# Patient Record
Sex: MALE | Race: WHITE | NOT HISPANIC OR LATINO | Employment: OTHER | URBAN - METROPOLITAN AREA
[De-identification: names, ages, dates, MRNs, and addresses within clinical notes are randomized per-mention and may not be internally consistent; named-entity substitution may affect disease eponyms.]

---

## 2017-03-01 ENCOUNTER — ALLSCRIPTS OFFICE VISIT (OUTPATIENT)
Dept: OTHER | Facility: OTHER | Age: 72
End: 2017-03-01

## 2017-03-29 DIAGNOSIS — I10 ESSENTIAL (PRIMARY) HYPERTENSION: ICD-10-CM

## 2017-04-03 ENCOUNTER — ALLSCRIPTS OFFICE VISIT (OUTPATIENT)
Dept: OTHER | Facility: OTHER | Age: 72
End: 2017-04-03

## 2017-06-09 ENCOUNTER — ALLSCRIPTS OFFICE VISIT (OUTPATIENT)
Dept: OTHER | Facility: OTHER | Age: 72
End: 2017-06-09

## 2017-06-12 ENCOUNTER — GENERIC CONVERSION - ENCOUNTER (OUTPATIENT)
Dept: OTHER | Facility: OTHER | Age: 72
End: 2017-06-12

## 2017-06-12 ENCOUNTER — APPOINTMENT (OUTPATIENT)
Dept: LAB | Facility: CLINIC | Age: 72
End: 2017-06-12
Payer: COMMERCIAL

## 2017-06-12 ENCOUNTER — TRANSCRIBE ORDERS (OUTPATIENT)
Dept: LAB | Facility: CLINIC | Age: 72
End: 2017-06-12

## 2017-06-12 DIAGNOSIS — F45.8 ANXIETY HYPERVENTILATION: Primary | ICD-10-CM

## 2017-06-12 DIAGNOSIS — F41.9 ANXIETY HYPERVENTILATION: Primary | ICD-10-CM

## 2017-06-12 LAB — VENIPUNCTURE: NORMAL

## 2017-06-12 PROCEDURE — 36415 COLL VENOUS BLD VENIPUNCTURE: CPT

## 2017-06-13 LAB
A/G RATIO (HISTORICAL): 2.1 (ref 1.2–2.2)
ALBUMIN SERPL BCP-MCNC: 4.6 G/DL (ref 3.5–4.8)
ALP SERPL-CCNC: 74 IU/L (ref 39–117)
ALT SERPL W P-5'-P-CCNC: 14 IU/L (ref 0–44)
AMYLASE (HISTORICAL): 70 U/L (ref 31–124)
AST SERPL W P-5'-P-CCNC: 15 IU/L (ref 0–40)
BACTERIA UR QL AUTO: NORMAL
BASOPHILS # BLD AUTO: 0 X10E3/UL (ref 0–0.2)
BASOPHILS # BLD AUTO: 1 %
BILIRUB SERPL-MCNC: 0.4 MG/DL (ref 0–1.2)
BILIRUB UR QL STRIP: NEGATIVE
BUN SERPL-MCNC: 20 MG/DL (ref 8–27)
BUN/CREA RATIO (HISTORICAL): 21 (ref 10–24)
CALCIUM SERPL-MCNC: 9.5 MG/DL (ref 8.6–10.2)
CHLORIDE SERPL-SCNC: 100 MMOL/L (ref 96–106)
CHOLEST SERPL-MCNC: 142 MG/DL (ref 100–199)
CHOLEST/HDLC SERPL: 4.2 RATIO UNITS (ref 0–5)
CO2 SERPL-SCNC: 25 MMOL/L (ref 18–29)
COLOR UR: YELLOW
COMMENT (HISTORICAL): CLEAR
CREAT SERPL-MCNC: 0.96 MG/DL (ref 0.76–1.27)
DEPRECATED RDW RBC AUTO: 15.9 % (ref 12.3–15.4)
EGFR AFRICAN AMERICAN (HISTORICAL): 90 ML/MIN/1.73
EGFR-AMERICAN CALC (HISTORICAL): 78 ML/MIN/1.73
EOSINOPHIL # BLD AUTO: 0.1 X10E3/UL (ref 0–0.4)
EOSINOPHIL # BLD AUTO: 2 %
ERYTHROCYTE SEDIMENTATION RATE (HISTORICAL): 9 MM/HR (ref 0–30)
FECAL OCCULT BLOOD DIAGNOSTIC (HISTORICAL): NEGATIVE
GLUCOSE (HISTORICAL): NEGATIVE
GLUCOSE SERPL-MCNC: 98 MG/DL (ref 65–99)
HCT VFR BLD AUTO: 39.6 % (ref 37.5–51)
HDLC SERPL-MCNC: 34 MG/DL
HGB BLD-MCNC: 12.8 G/DL (ref 12.6–17.7)
IMM.GRANULOCYTES (CD4/8) (HISTORICAL): 0 %
IMM.GRANULOCYTES (CD4/8) (HISTORICAL): 0 X10E3/UL (ref 0–0.1)
INTERPRETATION (HISTORICAL): NORMAL
KETONES UR STRIP-MCNC: NEGATIVE MG/DL
LDLC SERPL CALC-MCNC: 78 MG/DL (ref 0–99)
LEUKOCYTE ESTERASE UR QL STRIP: NEGATIVE
LIPASE SERPL-CCNC: 30 U/L (ref 0–59)
LYMPHOCYTES # BLD AUTO: 1.3 X10E3/UL (ref 0.7–3.1)
LYMPHOCYTES # BLD AUTO: 21 %
MAGNESIUM SERPL-MCNC: 2.4 MG/DL (ref 1.6–2.3)
MCH RBC QN AUTO: 27 PG (ref 26.6–33)
MCHC RBC AUTO-ENTMCNC: 32.3 G/DL (ref 31.5–35.7)
MCV RBC AUTO: 84 FL (ref 79–97)
MICROSCOPIC EXAMINATION (HISTORICAL): NORMAL
MICROSCOPIC EXAMINATION (HISTORICAL): NORMAL
MONOCYTES # BLD AUTO: 0.4 X10E3/UL (ref 0.1–0.9)
MONOCYTES (HISTORICAL): 7 %
MUCUS THREADS (HISTORICAL): PRESENT
NEUTROPHILS # BLD AUTO: 4.3 X10E3/UL (ref 1.4–7)
NEUTROPHILS # BLD AUTO: 69 %
NITRITE UR QL STRIP: NEGATIVE
NON-SQ EPI CELLS URNS QL MICRO: NORMAL /HPF
PH UR STRIP.AUTO: 6.5 [PH] (ref 5–7.5)
PLATELET # BLD AUTO: 192 X10E3/UL (ref 150–379)
POTASSIUM SERPL-SCNC: 4.3 MMOL/L (ref 3.5–5.2)
PROT UR STRIP-MCNC: NEGATIVE MG/DL
RBC (HISTORICAL): 4.74 X10E6/UL (ref 4.14–5.8)
RBC (HISTORICAL): NORMAL /HPF
SODIUM SERPL-SCNC: 139 MMOL/L (ref 134–144)
SP GR UR STRIP.AUTO: 1.02 (ref 1–1.03)
TOT. GLOBULIN, SERUM (HISTORICAL): 2.2 G/DL (ref 1.5–4.5)
TOTAL PROTEIN (HISTORICAL): 6.8 G/DL (ref 6–8.5)
TRIGL SERPL-MCNC: 149 MG/DL (ref 0–149)
TSH SERPL DL<=0.05 MIU/L-ACNC: 1.35 UIU/ML (ref 0.45–4.5)
URINALYSIS (UA) (HISTORICAL): NORMAL
UROBILINOGEN UR QL STRIP.AUTO: 0.2 EU/DL (ref 0.2–1)
VLDLC SERPL CALC-MCNC: 30 MG/DL (ref 5–40)
WBC # BLD AUTO: 6.2 X10E3/UL (ref 3.4–10.8)
WBC # BLD AUTO: NORMAL /HPF

## 2017-06-14 ENCOUNTER — GENERIC CONVERSION - ENCOUNTER (OUTPATIENT)
Dept: OTHER | Facility: OTHER | Age: 72
End: 2017-06-14

## 2017-08-30 ENCOUNTER — ALLSCRIPTS OFFICE VISIT (OUTPATIENT)
Dept: OTHER | Facility: OTHER | Age: 72
End: 2017-08-30

## 2017-11-15 ENCOUNTER — GENERIC CONVERSION - ENCOUNTER (OUTPATIENT)
Dept: OTHER | Facility: OTHER | Age: 72
End: 2017-11-15

## 2017-12-22 ENCOUNTER — ALLSCRIPTS OFFICE VISIT (OUTPATIENT)
Dept: OTHER | Facility: OTHER | Age: 72
End: 2017-12-22

## 2017-12-29 NOTE — PROGRESS NOTES
Assessment   1  Memory loss (780 93) (R41 3)   2  Benign hypertension (401 1) (I10)   3  Aortic stenosis, moderate (424 1) (I35 0)   4  Depression with anxiety (300 4) (F41 8)    Plan   Acute sinusitis    · Fluticasone Propionate 50 MCG/ACT Nasal Suspension  Aortic stenosis, moderate, Benign hypertension    · 1 - Kamilah King DO (Cardiology) Co-Management  *  Status: Active  Requested for:    06Whp8359  Care Summary provided  : Yes  Memory loss    · 1 Analilia Anton MD, [facility] Co-Management  *  Status: Active  Requested for:    50VHN1659  Care Summary provided  : Yes    Discussion/Summary   Possible side effects of new medications were reviewed with the patient/guardian today  The treatment plan was reviewed with the patient/guardian  The patient/guardian understands and agrees with the treatment plan      Chief Complaint   pt here for med check /bp   he did not get his refill in sept so he hasnât taken his valsartan since then   pt says he is starting to get forgetful , not everyday tc/cma      Advance Directives   Advance Directive [facility] Staff:    The patient is in agreement to receive the [address] - Patient has an advance health care directive  The patient has a living will located  in patient's home  The patient has a signed POLST form located in patient's home  Active Problems   1  Acute sinusitis (461 9) (J01 90)   2  Anxiety (300 00) (F41 9)   3  Aortic stenosis, moderate (424 1) (I35 0)   4  Arthritis of knee (716 96) (M17 10)   5  Atherosclerosis of arteries of extremities (440 20) (I70 209)   6  Atypical chest pain (786 59) (R07 89)   7  Benign hypertension (401 1) (I10)   8  BMI 26 0-26 9,adult (V85 22) (Z68 26)   9  Body mass index (BMI) of 28 0 to 28 9 in adult (V85 24) (Z68 28)   10  Bunion, unspecified laterality   11  Carcinoma in situ of prostate (233 4) (D07 5)   12  Chronic constipation (564 00) (K59 09)   13  Colon cancer screening (V76 51) (Z12 11)   14  Depression with anxiety (300 4) (F41 8)   15  Encounter for screening for malignant neoplasm of colon (V76 51) (Z12 11)   16  Flu vaccine need (V04 81) (Z23)   17  Fracture of bone of nasal sinus (801 00) (S02 19XA)   18  Glaucoma (365 9) (H40 9)   19  Hammer toe, unspecified laterality (735 4) (M20 40)   20  Headache (784 0) (R51)   21  Hearing Loss (389 9)   22  Influenza vaccination administered during current admission (V04 81) (Z23)   23  Inguinal pain (789 09) (R10 30)   24  Injury of head, initial encounter (959 01) (S09 90XA)   25  Knee pain, left (719 46) (M25 562)   26  Medicare annual wellness visit, subsequent (V70 0) (Z00 00)   27  Memory loss (780 93) (R41 3)   28  Muscle cramps (729 82) (R25 2)   29  Need for influenza vaccination (V04 81) (Z23)   30  Need for pneumococcal vaccination (V03 82) (Z23)   31  Need for tetanus booster (V03 7) (Z23)   32  Osteoarthritis of left knee (715 96) (M17 12)   33  Plantar fasciitis (728 71) (M72 2)   34  Preop examination (V72 84) (Z01 818)   35  Primary localized osteoarthritis of left knee (715 16) (M17 12)   36  Reported Hx Of Knee Replacement   37  Right leg pain (729 5) (M79 604)   38  Screening for depression (V79 0) (Z13 89)   39  Screening for genitourinary condition (V81 6) (Z13 89)   40  Screening for neurological condition (V80 09) (Z13 89)   41  Wears glasses (V49 89) (Z97 3)   42  Well adult on routine health check (V70 0) (Z00 00)    Past Medical History   1  _ (787 0)   2  _ (780 79)   3  Abnormal blood chemistry (790 6) (R79 9)   4  Acute upper respiratory infection (465 9) (J06 9)   5  Benign hypertension (401 1) (I10)   6  History of Bursitis of hip, unspecified laterality   7  History of Common migraine without aura (346 10) (G43 009)   8  History of Deep vein thrombophlebitis of leg, unspecified laterality (451 19) (I80 209)   9  Fever of unknown origin (780 60) (R50 9)   10   Foot ulcer, unspecified laterality, with unspecified severity (987 15) (L97 509)   11  Generalized anxiety disorder (300 02) (F41 1)   12  Glaucoma (365 9) (H40 9)   13  History of Hearing problem (V41 2) (H91 90)   14  Herniated Intervertebral Disc (722 2)   15  History of arthritis (V13 4) (Z87 39)   16  History of backache (V13 59) (Z87 39)   17  History of diarrhea (V12 79) (Z87 898)   18  History of headache (V13 89) (Z87 898)   19  History of malignant neoplasm of prostate (V10 46) (Z85 46)   20  History of malignant neoplasm of skin (V10 83) (Z85 828)   21  History of peripheral vascular disease (V12 59) (Z86 79)   22  Limb pain (729 5) (M79 609)   23  Localized, primary osteoarthritis of lower leg, unspecified laterality   24  Shoulder joint pain, unspecified laterality   25  History of Skin cancer of nose (173 30) (C44 301)   26  History of Skin rash (782 1) (R21)   27  History of Visit for pre-operative examination (V72 84) (S44 782)    Surgical History   1  History of Cataract Surgery   2  History of Cystoscopy With Insertion Of Ureteral Stent   3  History of Eye Surgery   4  History of Hernia Repair Inguinal Sliding   5  History of Needle Biopsy Of Prostate   6  History of Needle/Cath Placement For Brachyther Applic Into Genitalia   7  History of Reported Hx Of Knee Replacement   8  History of Simple Bunion Exostectomy (Silver Procedure)   9  History of Treatment Of Spinal Fracture    Family History   Mother    1  Family history of Alzheimer's dementia  Father    2  Family history of    3  Family history of cerebrovascular accident (V17 1) (Z82 3)    Social History    · Denied: History of Drug use   · Exercise habits   · Exercise: Walking   ·    · Never A Smoker   · No alcohol use   · Sleeps 8 - 10 hours a day    Current Meds    1  Combigan 0 2-0 5 % Ophthalmic Solution; Administer 1 drop to both eys two times a day; Therapy: (Recorded:90Pdi3940) to Recorded   2  Fluticasone Propionate 50 MCG/ACT Nasal Suspension;  Two sprays in each nostril     once daily; Therapy: 91FUF5676 to (Last Rx:15Nov2017)  Requested for: 29RSF1921 Ordered   3  Lumigan 0 01 % Ophthalmic Solution; Therapy: (Recorded:03Apr2017) to Recorded    Allergies   1  No Known Drug Allergies    Vitals   Vital Signs    Recorded: 22Dec2017 11:11AM   Temperature 96 9 F, Temporal   Heart Rate 58, R Radial   Pulse Quality Normal, R Radial   Respiration Quality Normal   Respiration 12   Systolic 901, LUE, Sitting   Diastolic 80, LUE, Sitting   Height 5 ft 6 5 in   Weight 173 lb    BMI Calculated 27 5   BSA Calculated 1 89     Results/Data   Falls Risk Assessment (Dx Z13 89 Screen for Neurologic Disorder) 22Dec2017 11:39AM User, [facility]      Test Name Result Flag Reference   Falls Risk      No falls in the past year      PHQ-9 Adult Depression Screening 22Dec2017 11:22AM User, [facility]      Test Name Result Flag Reference   PHQ-9 Adult Depression Score 2     Over the last two weeks, how often have you been bothered by any of the following problems? Little interest or pleasure in doing things: Not at all - 0     Feeling down, depressed, or hopeless: Several days - 1     Trouble falling or staying asleep, or sleeping too much: Not at all - 0     Feeling tired or having little energy: Not at all - 0     Poor appetite or over eating: Not at all - 0     Feeling bad about yourself - or that you are a failure or have let yourself or your family down: Not at all - 0     Trouble concentrating on things, such as reading the newspaper or watching television: Several days - 1     Moving or speaking so slowly that other people could have noticed   Or the opposite -  being so fidgety or restless that you have been moving around a lot more than usual: Not at all - 0     Thoughts that you would be better off dead, or of hurting yourself in some way: Not at all - 0   PHQ-9 Adult Depression Screening Negative     PHQ-9 Difficulty Level Not difficult at all     PHQ-9 Severity Minimal Depression          Future Appointments Date/Time Provider Specialty Site   02/02/2018 09:40 AM Sherren Ridges, DO Cardiology [facility] [address] [facility] [address]     Signatures    Electronically signed by : MIKKI Hyman ; Dec 28 2017  8:04AM EST                       (Author)

## 2018-01-12 NOTE — MISCELLANEOUS
Assessment    1  Atypical chest pain (786 59) (R07 89)   2  Benign hypertension (401 1) (I10)    Plan  Benign hypertension    · AmLODIPine Besylate 5 MG Oral Tablet (Norvasc); take 1 tablet by mouth every  day   Rx By: Latonya Atkins; Dispense: 0 Days ; #:90 Tablet; Refill: 3; For: Benign hypertension; JOSÉ MANUEL = N; Verified Transmission to [facility] #033; Last Updated By: System, [employer]; 12/5/2016 1:43:26 PM  Encounter for screening for malignant neoplasm of colon    · COLONOSCOPY; Status:Active - Retrospective Authorization; Requested for:[id number];    Perform:[facility]; U:[id number]; Last Updated By:Dayana Goss; 12/5/2016 1:06:17 PM;Ordered;  For:Encounter for screening for malignant neoplasm of colon; Ordered By:Dayan Fragoso; Discussion/Summary  Discussion Summary:   Will add ASA 81 mg a day   rto in 6 m for BP check hospital records reviewed  Medication SE Review and Pt Understands Tx: The treatment plan was reviewed with the patient/guardian  The patient/guardian understands and agrees with the treatment plan      Chief Complaint  Chief Complaint Free Text Note Form: TCM  ksd,cma      History of Present Illness  TCM Communication [facility]: The patient is being contacted for RADHA Reilly LPN [birth year]  He was hospitalized at [address]  The date of admission: 11/28/16, date of discharge: 11/30/16  Diagnosis: Chest pain, hypertension  He was discharged to home  Medications reviewed and updated today  He scheduled a follow up appointment     Symptoms: weakness and fatigue, but no fever, no dizziness, no headache, no cough, no chest pain, no back pain on left side, no back pain on right side, no arm pain left side, no arm pain on right side, no leg pain on left side, no leg pain on right side, no upper abdominal pain, no middle abdominal pain, no lower abdominal pain, no rash:, no anorexia, no nausea, no vomiting, no loose stools, no pain with urinating, no incisional pain, no wound drainage and no swelling  Counseling was provided to the patient  Communication performed and completed by RADHA Reilly LPN 84/0/3302   HPI: 66 y o m seen for f/u 24 h observation for episode of CP - normal nuclear stress test and ECHO, has [facility] cardio in 2 days - asymptomatic, Lipids reviewed      Review of Systems  Complete-Male:   Constitutional: No fever or chills, feels well, no tiredness, no recent weight gain or weight loss  Eyes: No complaints of eye pain, no red eyes, no discharge from eyes, no itchy eyes  ENT: no complaints of earache, no hearing loss, no nosebleeds, no nasal discharge, no sore throat, no hoarseness  Cardiovascular: No complaints of slow heart rate, no fast heart rate, no chest pain, no palpitations, no leg claudication, no lower extremity  Respiratory: No complaints of shortness of breath, no wheezing, no cough, no SOB on exertion, no orthopnea or PND  Gastrointestinal: No complaints of abdominal pain, no constipation, no nausea or vomiting, no diarrhea or bloody stools  Genitourinary: No complaints of dysuria, no incontinence, no hesitancy, no nocturia, no genital lesion, no testicular pain  Musculoskeletal: No complaints of arthralgia, no myalgias, no joint swelling or stiffness, no limb pain or swelling  Integumentary: No complaints of skin rash or skin lesions, no itching, no skin wound, no dry skin  Neurological: No compliants of headache, no confusion, no convulsions, no numbness or tingling, no dizziness or fainting, no limb weakness, no difficulty walking  Psychiatric: Is not suicidal, no sleep disturbances, no anxiety or depression, no change in personality, no emotional problems  Endocrine: No complaints of proptosis, no hot flashes, no muscle weakness, no erectile dysfunction, no deepening of the voice, no feelings of weakness     Hematologic/Lymphatic: No complaints of swollen glands, no swollen glands in the neck, does not bleed easily, no easy bruising  Active Problems    1  Anxiety (300 00) (F41 9)   2  Arthritis of knee (716 96) (M19 90)   3  Atherosclerosis of arteries of extremities (440 20) (I70 209)   4  Bunion, unspecified laterality   5  Carcinoma in situ of prostate (233 4) (D07 5)   6  Colon cancer screening (V76 51) (Z12 11)   7  Depression with anxiety (300 4) (F41 8)   8  Flu vaccine need (V04 81) (Z23)   9  Fracture of bone of nasal sinus (801 00) (S02 19XA)   10  Glaucoma (365 9) (H40 9)   11  Hammer toe, unspecified laterality (735 4) (M20 40)   12  Hearing Loss (389 9)   13  Influenza vaccination administered during current admission (V04 81) (Z23)   14  Inguinal pain (789 09) (R10 30)   15  Knee pain, left (719 46) (M25 562)   16  Memory loss (780 93) (R41 3)   17  Muscle cramps (729 82) (R25 2)   18  Osteoarthritis of left knee (715 96) (M17 9)   19  Plantar fasciitis (728 71) (M72 2)   20  Preop examination (V72 84) (Z01 818)   21  Primary localized osteoarthritis of left knee (715 16) (M17 12)   22  Reported Hx Of Knee Replacement   23  Right leg pain (729 5) (M79 604)   24  Screening for depression (V79 0) (Z13 89)   25  Screening for genitourinary condition (V81 6) (Z13 89)   26  Screening for neurological condition (V80 09) (Z13 89)   27  Wears glasses (V49 89) (Z97 3)   28  Well adult on routine health check (V70 0) (Z00 00)    Past Medical History    1  _ (787 0)   2  _ (780 79)   3  Abnormal blood chemistry (790 6) (R79 9)   4  Acute upper respiratory infection (465 9) (J06 9)   5  History of Bursitis of hip, unspecified laterality (726 5) (M70 70)   6  History of Common migraine without aura (346 10) (G43 009)   7  History of Deep vein thrombophlebitis of leg, unspecified laterality (451 19) (I80 209)   8  Fever of unknown origin (780 60) (R50 9)   9  Foot ulcer, unspecified laterality, with unspecified severity (707 15) (L97 509)   10  Generalized anxiety disorder (300 02) (F41 1)   11   Herniated Intervertebral Disc (722 2)   12  History of backache (V13 59) (Z87 39)   13  History of diarrhea (V12 79) (Z87 898)   14  History of headache (V13 89) (Z87 898)   15  History of malignant neoplasm of prostate (V10 46) (Z85 46)   16  History of peripheral vascular disease (V12 59) (Z86 79)   17  Limb pain (729 5) (M79 609)   18  Localized, primary osteoarthritis of lower leg, unspecified laterality   19  Shoulder joint pain, unspecified laterality   20  History of Skin cancer of nose (173 30) (C44 301)   21  History of Skin rash (782 1) (R21)   22  History of Visit for pre-operative examination (V72 84) (F94 187)    Surgical History    1  History of Cataract Surgery   2  History of Cystoscopy With Insertion Of Ureteral Stent   3  History of Hernia Repair Inguinal Sliding   4  History of Needle Biopsy Of Prostate   5  History of Needle/Cath Placement For Brachyther Applic Into Genitalia   6  History of Reported Hx Of Knee Replacement  Surgical History Reviewed: The surgical history was reviewed and updated today  Family History  Mother    1  Family history of Alzheimer's dementia  Father    2  Family history of    3  Family history of cerebrovascular accident (V17 1) (Z82 3)  Family History Reviewed: The family history was reviewed and updated today  Social History    · Denied: History of Drug use   ·    · Never A Smoker   · No alcohol use  Social History Reviewed: The social history was reviewed and updated today  Current Meds   1  Oxycodone-Acetaminophen 5-325 MG Oral Tablet; TAKE 1 TABLET EVERY 6 HOURS AS   NEEDED FOR PAIN;   Therapy: 95Noz0841 to (Evaluate:2016); Last Rx:[id number] Ordered   2  TraMADol HCl - 50 MG Oral Tablet; TAKE 1 TABLET EVERY 8 HOURS AS NEEDED; Therapy: 34YYV7901 to (Evaluate:24Wkd9975); Last Rx:84Efy9941 Ordered  Medication List Reviewed: The medication list was reviewed and updated today  Allergies    1   No Known Drug Allergies    Vitals  Signs   Recorded: 64KXT8583 01:26PM   Temperature: 97 F  Heart Rate: 70  Respiration: 16  Height: 5 ft 7 in  Weight: 184 lb   BMI Calculated: 28 82  BSA Calculated: 1 95  O2 Saturation: 91    recorded, did not cross over     Physical Exam    Constitutional   General appearance: No acute distress, well appearing and well nourished  overweight  Pulmonary   Respiratory effort: No increased work of breathing or signs of respiratory distress  Auscultation of lungs: Clear to auscultation, equal breath sounds bilaterally, no wheezes, no rales, no rhonci  Cardiovascular   Auscultation of heart: Normal rate and rhythm, normal S1 and S2, without murmurs  Examination of extremities for edema and/or varicosities: Normal     Neurologic   Cranial nerves: Cranial nerves 2-12 intact           Future Appointments    Date/Time Provider Specialty Site   12/07/2016 10:40 AM Ricky Lozoya DO Cardiology ST [address] [place] [address]     Signatures   Electronically signed by : MIKKI Page ; Dec  5 2016  1:50PM EST                       (Author)

## 2018-01-12 NOTE — RESULT NOTES
Verified Results  (1) AMYLASE 12Jun2017 12:00AM [facility]     Test Name Result Flag Reference   Amylase, Serum 70 U/L       (1) CBC/PLT/DIFF [id number] 12:00AM [facility]     Test Name Result Flag Reference   WBC 6 2 x10E3/uL  3 4-10 8   RBC 4 74 x10E6/uL  4 14-5 80   Hemoglobin 12 8 g/dL  12 6-17 7   Hematocrit 39 6 %  37 5-51 0   MCV 84 fL  79-97   MCH 27 0 pg  26 6-33 0   MCHC 32 3 g/dL  31 5-35 7   RDW 15 9 % H 12 3-15 4   Platelets 749 F63D0/ND  150-379   Neutrophils 69 %     Lymphs 21 %     Monocytes 7 %     Eos 2 %     Basos 1 %     Neutrophils (Absolute) 4 3 x10E3/uL  1 4-7 0   Lymphs (Absolute) 1 3 x10E3/uL  0 7-3 1   Monocytes(Absolute) 0 4 x10E3/uL  0 1-0 9   Eos (Absolute) 0 1 x10E3/uL  0 0-0 4   Baso (Absolute) 0 0 x10E3/uL  0 0-0 2   Immature Granulocytes 0 %     Immature Grans (Abs) 0 0 x10E3/uL  0 0-0 1     (1) COMPREHENSIVE METABOLIC PANEL [id number] 36:43DQ [facility]     Test Name Result Flag Reference   Glucose, Serum 98 mg/dL  65-99   BUN 20 mg/dL  8-27   Creatinine, Serum 0 96 mg/dL  0 76-1 27   BUN/Creatinine Ratio 21  10-24   Sodium, Serum 139 mmol/L  134-144   Potassium, Serum 4 3 mmol/L  3 5-5 2   Chloride, Serum 100 mmol/L     Carbon Dioxide, Total 25 mmol/L  18-29   Calcium, Serum 9 5 mg/dL  8 6-10 2   Protein, Total, Serum 6 8 g/dL  6 0-8 5   Albumin, Serum 4 6 g/dL  3 5-4 8   Globulin, Total 2 2 g/dL  1 5-4 5   A/G Ratio 2 1  1 2-2 2   Bilirubin, Total 0 4 mg/dL  0 0-1 2   Alkaline Phosphatase, S 74 IU/L     AST (SGOT) 15 IU/L  0-40   ALT (SGPT) 14 IU/L  0-44   eGFR If NonAfricn Am 78 mL/min/1 73  >59   eGFR If Africn Am 90 mL/min/1 73  >59     (1) LIPASE 12Jun2017 12:00AM [facility]     Test Name Result Flag Reference   Lipase, Serum 30 U/L  0-59     (1) LIPID PANEL, FASTING 12Jun2017 12:00AM [facility]     Test Name Result Flag Reference   Cholesterol, Total 142 mg/dL  100-199   Triglycerides 149 mg/dL  0-149   HDL Cholesterol 34 mg/dL L >39   VLDL Cholesterol Estuardo 30 mg/dL  5-40   LDL Cholesterol Calc 78 mg/dL  0-99   T  Chol/HDL Ratio 4 2 ratio units  0 0-5 0   T  Chol/HDL Ratio                                                             Men  Women                                               1/2 Avg  Risk  3 4    3 3                                                   Avg Risk  5 0    4 4                                                2X Avg  Risk  9 6    7 1                                                3X Avg  Risk 23 4   11 0     (1) MAGNESIUM 12Jun2017 12:00AM [facility]     Test Name Result Flag Reference   Magnesium, Serum 2 4 mg/dL H 1 6-2 3     (1) TSH [id number] 12:00AM [facility]     Test Name Result Flag Reference   TSH 1 350 uIU/mL  0 450-4 500     (LC) Sedimentation Rate-Westergren [id number] 12:00AM [facility]     Test Name Result Flag Reference   Sedimentation Rate-Westergren 9 mm/hr  0-30     (LC) UA/M w/rflx Culture, Routine 12Jun2017 12:00AM [facility]     Test Name Result Flag Reference   Specific Gravity 1 019  1 005-1 030   pH 6 5  5 0-7 5   Urine-Color Yellow  Yellow   Appearance Clear  Clear   WBC Esterase Negative  Negative   Protein Negative  Negative/Trace   Glucose Negative  Negative   Ketones Negative  Negative   Occult Blood Negative  Negative   Bilirubin Negative  Negative   Urobilinogen,Semi-Qn 0 2 EU/dL  0 2-1 0   Nitrite, Urine Negative  Negative   Microscopic Examination Comment     Microscopic follows if indicated  Microscopic Examination See below:     Urinalysis Reflex Comment     This specimen will not reflex to a Urine Culture  WBC 0-5 /hpf  0 -  5   RBC 0-2 /hpf  0 -  2   Epithelial Cells (non renal) None seen /hpf  0 - 10   Mucus Threads Present  Not Estab     Bacteria None seen  None seen/Few     [facility]) Cardiovascular Risk Assessment 12Jun2017 12:00AM [facility]     Test Name Result Flag Reference   Interpretation Note -------------------------------  CARDIOVASCULAR REPORT:  -------------------------------  Current available clinical information suggests the  patient's risk is at least INTERMEDIATE  Two major CHD risk  factors are present (age over 39 and HDL-C less than 40)  If  the patient has CHD or a CHD risk equivalent, the risk  category is high  If patient does not have CHD or a CHD risk  equivalent, consider use of the Pooled Cohort Equations to  estimate 10-year CVD risk, as individuals with greater than  7 5% risk may warrant more intensive therapy  The calculator  can be found at:  http://tools  cardiosource org/XYAJJ-Zmdh-Glavbtsxg/  -  Insulin resistance, obesity, excessive alcohol use, smoking,  nephrotic syndrome, liver disease, and certain medications  can cause secondary dyslipidemia  Consider evaluation if  clinically indicated  -  Fasting status is unknown; lipid assessment and treatment  suggestions assume patient was fasting  Therapeutic  lifestyle changes are always valuable to achieve optimal  blood lipid status (diet, exercise, weight management)  -------------------------------  LIPID MANAGEMENT  Select one patient risk category based upon medical history  and clinical judgment  Additional risk factors such as  personal or family history of premature CHD, smoking, and  hypertension modify a patient's goals of therapy  In CVD  prevention, the intensity of therapy should be adjusted to  the level of patient risk  MODERATE intensity statin therapy  generally results in an average LDL-C reduction of 30% to  less than 50% from the untreated baseline  Examples include  (daily doses): atorvastatin 10-20 mg, rosuvastatin 5-10 mg,  simvastatin 20-40 mg, pravastatin 40-80 mg, lovastatin 40  mg  HIGH intensity statin therapy generally results in an  average LDL-C reduction of 50% or more from the untreated  baseline   Examples include (daily doses): atorvastatin 40-80  mg and rosuvastatin 20 mg   -------------------------------  LOW RISK ASSESSMENT AND TREATMENT SUGGESTIONS  -------------------------------  LDL-C is optimal, 78 mg/dL  Non-HDL Cholesterol is optimal,  108 mg/dL  -  Considerations for use of statin therapy include family  history of premature atherosclerotic disease, elevated  coronary artery calcium score, ankle-brachial index < 0 9,  elevated CRP, or elevated 10-year CVD risk   -------------------------------  INTERMEDIATE RISK ASSESSMENT AND TREATMENT SUGGESTIONS  -------------------------------  LDL-C is optimal, 78 mg/dL  Non-HDL Cholesterol is optimal,  108 mg/dL  -  Consider measurement of LDL particle number or Apo B to  adjudicate need for further LDL lowering therapy  Factors  that may influence statin use include family history of  premature atherosclerotic disease, elevated coronary artery  calcium score, ankle-brachial index < 0 9, elevated CRP, or  elevated 10-year CVD risk  If statin cannot be tolerated or  increased, alternatives include use of an intestinal agent  (ezetimibe or bile acid sequestrant) or niacin   -------------------------------  HIGH RISK ASSESSMENT AND TREATMENT SUGGESTIONS  -------------------------------  LDL-C is normal, 78 mg/dL  Non-HDL Cholesterol is normal,  108 mg/dL  -  If at least a 50% LDL reduction from baseline has not been  achieved, begin or increase statin  Consider measurement of  LDL particle number or Apo B to adjudicate need for further  LDL lowering therapy  If statin cannot be tolerated or  increased, alternatives include use of an intestinal agent  (ezetimibe or bile acid sequestrant) or niacin   -------------------------------  DISCLAIMER  These assessments and treatment suggestions are provided as  a convenience in support of the physician-patient  relationship and are not intended to replace the physician's  clinical judgment  They are derived from the national  guidelines in addition to other evidence and expert opinion    The clinician should consider this information within the  context of clinical opinion and the individual patient  SEE GUIDANCE FOR CARDIOVASCULAR REPORT: National Heart,  Lung, and Blood [place]'s Third Report of the NCEP Expert  Panel on Detection, Evaluation and Treatment of High Blood  Cholesterol in Adults (ATP III) (2002  NIH publication  ); Bartolo et al  Diabetes Care 2008; 31(4):811-82;  Tory et al  Clin Chem 2009; 55(3):407-419; Jessica Brown et  al  2013 ACC/AHA guideline on the treatment of blood  cholesterol to reduce atherosclerotic cardiovascular risk in  adults: a report of the [employer] of  Cardiology/American Heart Association Task Force on Practice  Guidelines  Circulation 0385;061(SOELS 2):S1? S45  PDF Image            Plan  Benign hypertension    · Valsartan 80 MG Oral Tablet; take 1 tablet by mouth once daily

## 2018-01-13 VITALS
WEIGHT: 179 LBS | BODY MASS INDEX: 28.09 KG/M2 | DIASTOLIC BLOOD PRESSURE: 72 MMHG | RESPIRATION RATE: 16 BRPM | TEMPERATURE: 97.3 F | HEIGHT: 67 IN | SYSTOLIC BLOOD PRESSURE: 138 MMHG | OXYGEN SATURATION: 94 % | HEART RATE: 72 BPM

## 2018-01-13 VITALS
DIASTOLIC BLOOD PRESSURE: 60 MMHG | HEIGHT: 67 IN | RESPIRATION RATE: 16 BRPM | TEMPERATURE: 97.6 F | WEIGHT: 169 LBS | BODY MASS INDEX: 26.53 KG/M2 | SYSTOLIC BLOOD PRESSURE: 120 MMHG | HEART RATE: 60 BPM

## 2018-01-13 VITALS
WEIGHT: 182 LBS | HEIGHT: 67 IN | SYSTOLIC BLOOD PRESSURE: 158 MMHG | TEMPERATURE: 97.5 F | RESPIRATION RATE: 16 BRPM | BODY MASS INDEX: 28.56 KG/M2 | HEART RATE: 72 BPM | DIASTOLIC BLOOD PRESSURE: 80 MMHG

## 2018-01-14 VITALS
RESPIRATION RATE: 16 BRPM | WEIGHT: 175 LBS | SYSTOLIC BLOOD PRESSURE: 132 MMHG | BODY MASS INDEX: 27.47 KG/M2 | DIASTOLIC BLOOD PRESSURE: 68 MMHG | HEART RATE: 68 BPM | HEIGHT: 67 IN | TEMPERATURE: 98 F

## 2018-01-15 NOTE — CONSULTS
Chief Complaint  Pre-op clearance per Dr Bladimir Mclaughlin left total knee replacement 2-8-16 fax [phone]  ck/lpn      History of Present Illness  Pre-Op Visit: The patient is being seen for a preoperative visit  The procedure is a(n) Left total knee replacement scheduled for February 8 with Cyrus Mariee  Surgical Risk Assessment:   Prior Anesthesia: He had prior anesthesia and no prior adverse reaction to general anesthesia  Pertinent Past Medical History: no CAD, no CHF, no chronic liver disease, DVT, does not use anticoagulants, no diabetes, no thyroid disease, no neck osteoarthrosis, no TMJ osteoarthrosis, wears dentures, no seizure disorder, no CVA, no asthma, no COPD, not MATEO, no renal disease, no low serum albumin and obesity  Exercise Capacity: unable to walk four blocks without symptoms and unable to walk two flights of stairs without symptoms  Lifestyle Factors: denies tobacco use and denies heavy alcohol consumption  Review of Systems    Constitutional: No fever or chills, feels well, no tiredness, no recent weight gain or weight loss  Eyes: No complaints of eye pain, no red eyes, no discharge from eyes, no itchy eyes  ENT: no complaints of earache, no hearing loss, no nosebleeds, no nasal discharge, no sore throat, no hoarseness  Cardiovascular: no chest pain, no intermittent leg claudication, the heart rate was not fast, no palpitations and no extremity edema  Respiratory: no cough and no shortness of breath during exertion  Gastrointestinal: No complaints of abdominal pain, no constipation, no nausea or vomiting, no diarrhea or bloody stools  Genitourinary: No complaints of dysuria, no incontinence, no hesitancy, no nocturia, no genital lesion, no testicular pain  Musculoskeletal: arthralgias  Integumentary: No complaints of skin rash or skin lesions, no itching, no skin wound, no dry skin     Neurological: No compliants of headache, no confusion, no convulsions, no numbness or tingling, no dizziness or fainting, no limb weakness, no difficulty walking  Psychiatric: no sleep disturbances  Endocrine: No complaints of proptosis, no hot flashes, no muscle weakness, no erectile dysfunction, no deepening of the voice, no feelings of weakness  Hematologic/Lymphatic: No complaints of swollen glands, no swollen glands in the neck, does not bleed easily, no easy bruising  Active Problems    1  Anxiety (300 00) (F41 9)   2  Arthritis of knee (716 96) (M19 90)   3  Atherosclerosis of arteries of extremities (440 20) (I70 209)   4  Bunion, unspecified laterality (727 1) (M20 10)   5  Carcinoma in situ of prostate (233 4) (D07 5)   6  Colon cancer screening (V76 51) (Z12 11)   7  Depression with anxiety (300 4) (F41 8)   8  Glaucoma (365 9) (H40 9)   9  Hammer toe, unspecified laterality (735 4) (M20 40)   10  Hearing Loss (389 9)   11  Influenza vaccination administered during current admission (V04 81) (Z23)   12  Inguinal pain (789 09) (R10 30)   13  Knee pain, left (719 46) (M25 562)   14  Memory loss (780 93) (R41 3)   15  Muscle cramps (729 82) (R25 2)   16  Osteoarthritis of left knee (715 96) (M17 9)   17  Plantar fasciitis (728 71) (M72 2)   18  Primary localized osteoarthritis of left knee (715 16) (M17 12)   19  Reported Hx Of Knee Replacement   20  Right leg pain (729 5) (M79 604)   21  Screening for depression (V79 0) (Z13 89)   22  Screening for genitourinary condition (V81 6) (Z13 89)   23  Screening for neurological condition (V80 09) (Z13 89)   24  Wears glasses (V49 89) (Z97 3)   25   Well adult on routine health check (V70 0) (Z00 00)    Past Medical History    · _ (787 0)   · _ (780 79)   · Abnormal blood chemistry (790 6) (R79 9)   · Acute upper respiratory infection (465 9) (J06 9)   · History of Bursitis of hip, unspecified laterality (726 5) (M70 70)   · History of Common migraine without aura (346 10) (G43 009)   · History of Deep vein thrombophlebitis of leg, unspecified laterality (451 19) (I80 209)   · Fever of unknown origin (780 60) (R50 9)   · Foot ulcer, unspecified laterality, with unspecified severity (707 15) (L97 509)   · Generalized anxiety disorder (300 02) (F41 1)   · Herniated Intervertebral Disc (722 2)   · History of backache (V13 59) (Z87 39)   · History of diarrhea (V12 79) (G67 961)   · History of headache (V13 89) (E02 976)   · History of malignant neoplasm of prostate (V10 46) (Z85 46)   · History of peripheral vascular disease (V12 59) (Z86 79)   · Limb pain (729 5) (M79 609)   · Localized, primary osteoarthritis of lower leg, unspecified laterality   · Shoulder joint pain, unspecified laterality (719 41) (M25 519)   · History of Skin cancer of nose (173 30) (C44 301)   · History of Skin rash (782 1) (R21)   · History of Visit for pre-operative examination (V72 84) (E21 531)    Surgical History    · History of Cataract Surgery   · History of Cystoscopy With Insertion Of Ureteral Stent   · History of Hernia Repair Inguinal Sliding   · History of Needle Biopsy Of Prostate   · History of Needle/Cath Placement For Brachyther Applic Into Genitalia   · History of Reported Hx Of Knee Replacement    The surgical history was reviewed and updated today  Family History    · Family history of Alzheimer's dementia    · Family history of    · Family history of cerebrovascular accident (V17 1) (Z82 3)    Social History    · Denied: History of Drug use   ·    · Never A Smoker   · No alcohol use  The social history was reviewed and is unchanged  Current Meds   1  Combigan 0 2-0 5 % Ophthalmic Solution; one drop both eyes bid; Therapy: [id number] to  Requested for: [id number] Recorded   2  Lumigan 0 01 % Ophthalmic Solution; one drop both eyes bid; Therapy: 16PVB4439 to  Requested for: [id number] Recorded   3  TraMADol HCl - 50 MG Oral Tablet; TAKE 1 TABLET EVERY 8 HOURS AS NEEDED; Therapy: 27SUG0842 to (Evaluate:2015);  Last Rx: [id number] Ordered    The medication list was reviewed and updated today  Allergies    1  No Known Drug Allergies    Vitals  Signs [Data Includes: Current Encounter]    Temperature: 97 3 F  Heart Rate: 72  Respiration: 14  Systolic: 944  Diastolic: 62  Height: 5 ft 7 in  Weight: 185 lb   BMI Calculated: 28 98  BSA Calculated: 1 96    Physical Exam    Constitutional   General appearance: Abnormal   uncomfortable and obese  Eyes   Conjunctiva and lids: No erythema, swelling or discharge  Pupils and irises: Equal, round, reactive to light  Ophthalmoscopic examination: Normal fundi and optic discs  Ears, Nose, Mouth, and Throat   Otoscopic examination: Tympanic membranes translucent with normal light reflex  Canals patent without erythema  Hearing: Abnormal   Hearing in the right ear: diminished  Hearing in the left ear: dimished  hearing aids bilaterally  Lips, teeth, and gums: Abnormal   Examination of the teeth revealed missing teeth and upper dentures  Oropharynx: Normal with no erythema, edema, exudate or lesions  Neck   Neck: Supple, symmetric, trachea midline, no masses  Thyroid: Normal, no thyromegaly  Pulmonary   Respiratory effort: No increased work of breathing or signs of respiratory distress  Percussion of chest: Normal     Palpation of chest: Normal     Auscultation of lungs: Clear to auscultation  Cardiovascular   Palpation of heart: Normal PMI, no thrills  Auscultation of heart: Normal rate and rhythm, normal S1 and S2, no murmurs  Carotid pulses: 2+ bilaterally  Abdominal aorta: Normal     Examination of extremities for edema and/or varicosities: Normal     Chest   Chest: Normal     Abdomen   Abdomen: Abnormal   The abdomen was obese  Bowel sounds were normal  The abdomen was soft and nontender  Liver and spleen: No hepatomegaly or splenomegaly  Examination for hernias: No hernias appreciated  No right inguinal hernia was palpated   No left inguinal hernia was palpated  Genitourinary   Penis: Normal, no lesions  Examination of the circumcised penis showed  Lymphatic   Palpation of lymph nodes in neck: No lymphadenopathy  Palpation of lymph nodes in axillae: No lymphadenopathy  Musculoskeletal   Gait and station: Abnormal   Uses a cane  Muscle strength/tone: Normal     Skin   Skin and subcutaneous tissue: Normal without rashes or lesions  Neurologic   Cranial nerves: Cranial nerves 2-12 intact  Cortical function: Normal mental status  Reflexes: 2+ and symmetric  Psychiatric   Judgment and insight: Normal     Mood and affect: Normal        Assessment    1  Preop examination (V72 84) (Z01 818)   2  Arthritis of knee (716 96) (M19 90)    Discussion/Summary  Surgical Clearance: He is at a LOW risk from a cardiovascular standpoint at this time without any additional cardiac testing  Reevaluation needed, if he should present with symptoms prior to surgery/procedure  Surgical clearance faxed to Dr Olivares American   Labs test reviewed include Type and cross, CBC and diff, PT/PTT, BMP, calcium, urinalysis, urine culture and ECG; all normal  Patient cleared for surgery  Appropriate caution for DVT prophylaxis  The patient was counseled regarding instructions for management, importance of compliance with treatment  End of Encounter Meds    1  TraMADol HCl - 50 MG Oral Tablet (Ultram); TAKE 1 TABLET EVERY 8 HOURS AS   NEEDED; Therapy: [id number] to (Evaluate:03Nov2015); Last Rx:[id number] Ordered    2  Combigan 0 2-0 5 % Ophthalmic Solution; one drop both eyes bid; Therapy: 12IIO2253 to  Requested for: [id number] Recorded   3  Lumigan 0 01 % Ophthalmic Solution; one drop both eyes bid;    Therapy: 34QMJ7528 to  Requested for: [id number] Recorded    Signatures   Electronically signed by : MIKKI Mehta ; Jan 20 2016  5:40PM EST                       (Author)

## 2018-01-22 VITALS
RESPIRATION RATE: 12 BRPM | SYSTOLIC BLOOD PRESSURE: 134 MMHG | BODY MASS INDEX: 27.62 KG/M2 | WEIGHT: 176 LBS | HEIGHT: 67 IN | HEART RATE: 64 BPM | TEMPERATURE: 97.9 F | DIASTOLIC BLOOD PRESSURE: 70 MMHG

## 2018-01-23 VITALS
BODY MASS INDEX: 27.15 KG/M2 | RESPIRATION RATE: 12 BRPM | TEMPERATURE: 96.9 F | WEIGHT: 173 LBS | HEART RATE: 58 BPM | SYSTOLIC BLOOD PRESSURE: 120 MMHG | DIASTOLIC BLOOD PRESSURE: 80 MMHG | HEIGHT: 67 IN

## 2018-01-24 NOTE — PROGRESS NOTES
Assessment   1  Preop examination (V72 84) (Z01 818)  2  Arthritis of knee (316 96) (M19 90)    Discussion/Summary  Surgical Clearance: He is at a LOW risk from a cardiovascular standpoint at this time without any additional cardiac testing  Reevaluation needed, if he should present with symptoms prior to surgery/procedure  Surgical clearance faxed to Dr Eloy Licea 1      Labs test reviewed include Type and cross, CBC and diff, PT/PTT, BMP, calcium, urinalysis, urine culture and ECG; all normal  Patient cleared for surgery  Appropriate caution for DVT prophylaxis1    The patient was counseled regarding instructions for management, importance of compliance with treatment  1 Amended By: Chi Queen ; Jan 20 2016 5:40 PM EST    Chief Complaint  Pre-op clearance per Dr Hayden Rogers left total knee replacement 2-8-16 fax [phone]  ck/lpn      History of Present Illness  Pre-Op Visit: The patient is being seen for a preoperative visit  The procedure is a(n) Left total knee replacement scheduled for February 8 with Eloy Licea   Surgical Risk Assessment:   Prior Anesthesia: He had prior anesthesia and no prior adverse reaction to general anesthesia  Pertinent Past Medical History: no CAD, no CHF, no chronic liver disease, DVT, does not use anticoagulants, no diabetes, no thyroid disease, no neck osteoarthrosis, no TMJ osteoarthrosis, wears dentures, no seizure disorder, no CVA, no asthma, no COPD, not MATEO, no renal disease, no low serum albumin and obesity  Exercise Capacity: unable to walk four blocks without symptoms and unable to walk two flights of stairs without symptoms  Lifestyle Factors: denies tobacco use and denies heavy alcohol consumption  1 Amended By: Chi Queen ; Jan 20 2016 5:37 PM EST    Review of Systems    Constitutional: No fever or chills, feels well, no tiredness, no recent weight gain or weight loss     Eyes: No complaints of eye pain, no red eyes, no discharge from eyes, no itchy eyes  ENT: no complaints of earache, no hearing loss, no nosebleeds, no nasal discharge, no sore throat, no hoarseness  Cardiovascular: no chest pain, no intermittent leg claudication, the heart rate was not fast, no palpitations and no extremity edema  Respiratory: no cough and no shortness of breath during exertion  Gastrointestinal: No complaints of abdominal pain, no constipation, no nausea or vomiting, no diarrhea or bloody stools  Genitourinary: No complaints of dysuria, no incontinence, no hesitancy, no nocturia, no genital lesion, no testicular pain  Musculoskeletal: arthralgias  Integumentary: No complaints of skin rash or skin lesions, no itching, no skin wound, no dry skin  Neurological: No compliants of headache, no confusion, no convulsions, no numbness or tingling, no dizziness or fainting, no limb weakness, no difficulty walking  Psychiatric: no sleep disturbances  Endocrine: No complaints of proptosis, no hot flashes, no muscle weakness, no erectile dysfunction, no deepening of the voice, no feelings of weakness  Hematologic/Lymphatic: No complaints of swollen glands, no swollen glands in the neck, does not bleed easily, no easy bruising  Active Problems   1  Anxiety (300 00) (F41 9)  2  Arthritis of knee (716 96) (M19 90)  3  Atherosclerosis of arteries of extremities (440 20) (I70 209)  4  Bunion, unspecified laterality (727 1) (M20 10)  5  Carcinoma in situ of prostate (233 4) (D07 5)  6  Colon cancer screening (V76 51) (Z12 11)  7  Depression with anxiety (300 4) (F41 8)  8  Glaucoma (365 9) (H40 9)  9  Hammer toe, unspecified laterality (735 4) (M20 40)  10  Hearing Loss (389 9)  11  Influenza vaccination administered during current admission (V04 81) (Z23)  12  Inguinal pain (789 09) (R10 30)  13  Knee pain, left (719 46) (M25 562)  14  Memory loss (780 93) (R41 3)  15  Muscle cramps (729 82) (R25 2)  16   Osteoarthritis of left knee (715 96) (M17 9)  17  Plantar fasciitis (728 71) (M72 2)  18  Primary localized osteoarthritis of left knee (715 16) (M17 12)  19  Reported Hx Of Knee Replacement  20  Right leg pain (729 5) (M79 604)  21  Screening for depression (V79 0) (Z13 89)  22  Screening for genitourinary condition (V81 6) (Z13 89)  23  Screening for neurological condition (V80 09) (Z13 89)  24  Wears glasses (V49 89) (Z97 3)  25   Well adult on routine health check (V70 0) (Z00 00)    Past Medical History    · _ (787 0)   · _ (780 79)   · Abnormal blood chemistry (790 6) (R79 9)   · Acute upper respiratory infection (465 9) (J06 9)   · History of Bursitis of hip, unspecified laterality (726 5) (M70 70)   · History of Common migraine without aura (346 10) (G43 009)   · History of Deep vein thrombophlebitis of leg, unspecified laterality (451 19) (I80 209)   · Fever of unknown origin (780 60) (R50 9)   · Foot ulcer, unspecified laterality, with unspecified severity (707 15) (L97 509)   · Generalized anxiety disorder (300 02) (F41 1)   · Herniated Intervertebral Disc (722 2)   · History of backache (V13 59) (Z87 39)   · History of diarrhea (V12 79) (G62 709)   · History of headache (V13 89) (B67 438)   · History of malignant neoplasm of prostate (V10 46) (Z85 46)   · History of peripheral vascular disease (V12 59) (Z86 79)   · Limb pain (729 5) (M79 609)   · Localized, primary osteoarthritis of lower leg, unspecified laterality   · Shoulder joint pain, unspecified laterality (719 41) (M25 519)   · History of Skin cancer of nose (173 30) (C44 301)   · History of Skin rash (782 1) (R21)   · History of Visit for pre-operative examination (V72 84) (Y14 497)    Surgical History    · History of Cataract Surgery   · History of Cystoscopy With Insertion Of Ureteral Stent   · History of Hernia Repair Inguinal Sliding   · History of Needle Biopsy Of Prostate   · History of Needle/Cath Placement For Brachyther Applic Into Genitalia   · History of Reported Hx Of Knee Replacement    The surgical history was reviewed and updated today  Family History    · Family history of Alzheimer's dementia    · Family history of    · Family history of cerebrovascular accident (V17 1) (Z82 3)    Social History    · Denied: History of Drug use   ·    · Never A Smoker   · No alcohol use  The social history was reviewed and is unchanged  Current Meds  1  Combigan 0 2-0 5 % Ophthalmic Solution; one drop both eyes bid; Therapy: 08XBO7564 to  Requested for: 47ZHX7240 Recorded  2  Lumigan 0 01 % Ophthalmic Solution; one drop both eyes bid; Therapy: 90WAU2277 to  Requested for: 12HYQ9464 Recorded  3  TraMADol HCl - 50 MG Oral Tablet; TAKE 1 TABLET EVERY 8 HOURS AS NEEDED; Therapy: 50VRD3812 to (Evaluate:2015); Last Rx:2015 Ordered    The medication list was reviewed and updated today  Allergies   1  No Known Drug Allergies    Vitals   Recorded: 2016 03:23PM   Temperature 97 3 F   Heart Rate 72   Respiration 14   Systolic 116   Diastolic 62   Height 5 ft 7 in   Weight 185 lb    BMI Calculated 28 98   BSA Calculated 1 96     Physical Exam    Constitutional   General appearance: Abnormal   uncomfortable and obese  Eyes   Conjunctiva and lids: No erythema, swelling or discharge  Pupils and irises: Equal, round, reactive to light  Ophthalmoscopic examination: Normal fundi and optic discs  Ears, Nose, Mouth, and Throat   Otoscopic examination: Tympanic membranes translucent with normal light reflex  Canals patent without erythema  Hearing: Abnormal   Hearing in the right ear: diminished  Hearing in the left ear: dimished  hearing aids bilaterally  Lips, teeth, and gums: Abnormal   Examination of the teeth revealed missing teeth and upper dentures  Oropharynx: Normal with no erythema, edema, exudate or lesions  Neck   Neck: Supple, symmetric, trachea midline, no masses  Thyroid: Normal, no thyromegaly      Pulmonary   Respiratory effort: No increased work of breathing or signs of respiratory distress  Percussion of chest: Normal     Palpation of chest: Normal     Auscultation of lungs: Clear to auscultation  Cardiovascular   Palpation of heart: Normal PMI, no thrills  Auscultation of heart: Normal rate and rhythm, normal S1 and S2, no murmurs  Carotid pulses: 2+ bilaterally  Abdominal aorta: Normal     Examination of extremities for edema and/or varicosities: Normal     Chest   Chest: Normal     Abdomen   Abdomen: Abnormal   The abdomen was obese  Bowel sounds were normal  The abdomen was soft and nontender  Liver and spleen: No hepatomegaly or splenomegaly  Examination for hernias: No hernias appreciated  No right inguinal hernia was palpated  No left inguinal hernia was palpated  Genitourinary   Penis: Normal, no lesions  Examination of the circumcised penis showed  Lymphatic   Palpation of lymph nodes in neck: No lymphadenopathy  Palpation of lymph nodes in axillae: No lymphadenopathy  Musculoskeletal   Gait and station: Abnormal   Uses a cane  Muscle strength/tone: Normal     Skin   Skin and subcutaneous tissue: Normal without rashes or lesions  Neurologic   Cranial nerves: Cranial nerves 2-12 intact  Cortical function: Normal mental status  Reflexes: 2+ and symmetric  Psychiatric   Judgment and insight: Normal     Mood and affect: Normal        End of Encounter Meds   1  TraMADol HCl - 50 MG Oral Tablet (Ultram); TAKE 1 TABLET EVERY 8 HOURS AS   NEEDED; Therapy: 16TNN7008 to (Evaluate:03Nov2015); Last Rx:14Oct2015 Ordered   2  Combigan 0 2-0 5 % Ophthalmic Solution; one drop both eyes bid; Therapy: 08TKY9626 to  Requested for: [id number] Recorded  3  Lumigan 0 01 % Ophthalmic Solution; one drop both eyes bid;    Therapy: 54DVT7538 to  Requested for: 42LST7347 Recorded    Signatures   Electronically signed by : MIKKI Philip ; Jan 20 2016  5:40PM EST                       (Author)

## 2018-02-01 RX ORDER — VALSARTAN 80 MG/1
1 TABLET ORAL DAILY
COMMUNITY
Start: 2017-03-01 | End: 2018-02-02 | Stop reason: ALTCHOICE

## 2018-02-01 RX ORDER — FLUTICASONE PROPIONATE 50 MCG
2 SPRAY, SUSPENSION (ML) NASAL DAILY
COMMUNITY
Start: 2017-11-15 | End: 2018-02-02 | Stop reason: ALTCHOICE

## 2018-02-02 ENCOUNTER — OFFICE VISIT (OUTPATIENT)
Dept: CARDIOLOGY CLINIC | Facility: CLINIC | Age: 73
End: 2018-02-02
Payer: COMMERCIAL

## 2018-02-02 VITALS
HEIGHT: 71 IN | WEIGHT: 175 LBS | BODY MASS INDEX: 24.5 KG/M2 | DIASTOLIC BLOOD PRESSURE: 80 MMHG | OXYGEN SATURATION: 98 % | SYSTOLIC BLOOD PRESSURE: 122 MMHG | HEART RATE: 61 BPM

## 2018-02-02 DIAGNOSIS — I10 BENIGN HYPERTENSION: ICD-10-CM

## 2018-02-02 DIAGNOSIS — I35.0 AORTIC STENOSIS, MODERATE: Primary | ICD-10-CM

## 2018-02-02 PROCEDURE — 93000 ELECTROCARDIOGRAM COMPLETE: CPT | Performed by: INTERNAL MEDICINE

## 2018-02-02 PROCEDURE — 99214 OFFICE O/P EST MOD 30 MIN: CPT | Performed by: INTERNAL MEDICINE

## 2018-02-02 RX ORDER — UREA 10 %
500 LOTION (ML) TOPICAL DAILY
COMMUNITY
End: 2018-07-03

## 2018-02-02 NOTE — PROGRESS NOTES
Cardiology Follow Up    Timkeily Espinozas  1945  [id number]     Interval History: Mr Kelvin Daniels is here for follow up of hypertension and aortic stenosis  He was admitted to [place] [address] in 2016 because of atypical chest pain  Pain was left sided, near left shoulder  No associated shortness of breath  Pain did not occur with exertion  He has had no further chest pain or shortness of breath with exertion  He is active without any significant symptoms  He was also noted to be hypertensive while hospitalized and was started on amlodipine which was subsequently discontinued as BP was normal   He has no complaints at this time  Echocardiogram 11/2016: EF 55-60% with grade 1 dysfunction  Trace MR, Mild TR  Mild AS  Nuclear stress test 11/2016: No ischemia or infarction  Past Medical History:   Diagnosis Date    Backache     Benign essential HTN     Cataract     Diarrhea     DVT (deep venous thrombosis) (HCC)     Right Leg    Glaucoma     Prostate cancer (HCC)     PVD (peripheral vascular disease) (HCC)     Skin cancer of nose     Skin cancer of nose      Social History     Social History    Marital status: /Civil Union     Spouse name: N/A    Number of children: N/A    Years of education: N/A     Occupational History    Not on file       Social History Main Topics    Smoking status: Never Smoker    Smokeless tobacco: Never Used    Alcohol use No    Drug use: No    Sexual activity: No     Other Topics Concern    Not on file     Social History Narrative    No narrative on file      Family History   Problem Relation Age of Onset    Alzheimer's disease Mother     Stroke Father      Past Surgical History:   Procedure Laterality Date    CATARACT EXTRACTION      HERNIA REPAIR      RENAL ARTERY STENT      REPLACEMENT TOTAL KNEE BILATERAL         Current Outpatient Prescriptions:     bimatoprost (LUMIGAN) 0 01 % ophthalmic drops, Administer 1 drop to both eyes daily at bedtime  , Disp: , Rfl:     brimonidine-timolol (COMBIGAN) 0 2-0 5 %, Administer 1 drop to both eyes 2 (two) times a day, Disp: , Rfl:     magnesium gluconate (MAGONATE) 500 mg tablet, Take 500 mg by mouth daily, Disp: , Rfl:   No Known Allergies    Labs:  Lab Results   Component Value Date     06/12/2017    K 4 3 06/12/2017     06/12/2017    CO2 25 06/12/2017    BUN 20 06/12/2017    CREATININE 0 96 06/12/2017    GLUCOSE 98 06/12/2017    CALCIUM 9 5 06/12/2017     Lab Results   Component Value Date    CHOL 142 06/12/2017    TRIG 149 06/12/2017    HDL 34 (L) 06/12/2017     Imaging: No results found  EKG done today was reviewed: Normal sinus rhythm with no ST abnormalities    Review of Systems:  Review of Systems   Constitutional: Negative for chills, fatigue and fever  HENT: Negative for congestion, nosebleeds and postnasal drip  Respiratory: Negative for cough, chest tightness and shortness of breath  Cardiovascular: Negative for chest pain, palpitations and leg swelling  Gastrointestinal: Negative for abdominal distention, abdominal pain, diarrhea, nausea and vomiting  Endocrine: Negative for polydipsia, polyphagia and polyuria  Musculoskeletal: Negative for gait problem and myalgias  Skin: Negative for color change, pallor and rash  Allergic/Immunologic: Negative for environmental allergies, food allergies and immunocompromised state  Neurological: Negative for dizziness, seizures, syncope and light-headedness  Hematological: Negative for adenopathy  Does not bruise/bleed easily  Psychiatric/Behavioral: Negative for dysphoric mood  The patient is not nervous/anxious  Physical Exam:  /80 (BP Location: Left arm, Patient Position: Sitting, Cuff Size: Standard)   Pulse 61 Comment: apical  Ht 5' 11" (1 803 m)   Wt 79 4 kg (175 lb)   SpO2 98%   BMI 24 41 kg/m²   Physical Exam   Constitutional: He is oriented to person, place, and time   He appears well-developed  No distress  HENT:   Head: Normocephalic and atraumatic  Eyes: Conjunctivae and EOM are normal  Pupils are equal, round, and reactive to light  Neck: Neck supple  No JVD present  No thyromegaly present  Cardiovascular: Normal rate, regular rhythm, normal heart sounds and intact distal pulses  Exam reveals no gallop and no friction rub  No murmur heard  Pulmonary/Chest: Effort normal and breath sounds normal    Abdominal: Soft  He exhibits no distension  There is no tenderness  Musculoskeletal: He exhibits no edema  Neurological: He is alert and oriented to person, place, and time  No cranial nerve deficit  Skin: Skin is warm and dry  No rash noted  He is not diaphoretic  No erythema  Psychiatric: He has a normal mood and affect  His behavior is normal  Judgment and thought content normal        1  Aortic stenosis, moderate  POCT ECG   2  Benign hypertension          Discussion/Summary:  1  Chest pain - resolved  2  Aortic stenosis - review of echocardiogram shows mild disease only  Will repeat echocardiogram next year  No appreciable murmur on exam today  3  Hypertension - improved  Discussed low salt diet  Discussed risk factor reduction including refraining from smoking, eating a diet high in fruits and vegetables, maintaining a healthy weight, limiting screen time along with controlling BP and cholesterol  Encouraged to exercise 150 minutes a week at a moderate level such as a fast walk or 75 minutes of high intensity            Estela Morales DO  Please call with any questions or suggestions

## 2018-02-02 NOTE — LETTER
February 2, 2018     Du Wiggins MD  [address] [facility]    Patient: Angela Magana   YOB: 1945   Date of Visit: 2/2/2018       Dear Dr Belem Harding: Thank you for referring Meaghan Rosario to me for evaluation  Below are my notes for this consultation  If you have questions, please do not hesitate to call me  I look forward to following your patient along with you  Sincerely,        Raphael Byrd DO        CC: No Recipients  Kamilah King DO  2/2/2018 10:18 AM  Sign at close encounter     Cardiology Follow Up    Angela Magana  1945  [id number]     Interval History: Mr Tam Arnett is here for follow up of hypertension and aortic stenosis  He was admitted to [place] [address] in 2016 because of atypical chest pain  Pain was left sided, near left shoulder  No associated shortness of breath  Pain did not occur with exertion  He has had no further chest pain or shortness of breath with exertion  He is active without any significant symptoms  He was also noted to be hypertensive while hospitalized and was started on amlodipine which was subsequently discontinued as BP was normal   He has no complaints at this time  Echocardiogram 11/2016: EF 55-60% with grade 1 dysfunction  Trace MR, Mild TR  Mild AS  Nuclear stress test 11/2016: No ischemia or infarction  Past Medical History:   Diagnosis Date    Backache     Benign essential HTN     Cataract     Diarrhea     DVT (deep venous thrombosis) (HCC)     Right Leg    Glaucoma     Prostate cancer (HCC)     PVD (peripheral vascular disease) (HCC)     Skin cancer of nose     Skin cancer of nose      Social History     Social History    Marital status: /Civil Union     Spouse name: N/A    Number of children: N/A    Years of education: N/A     Occupational History    Not on file       Social History Main Topics    Smoking status: Never Smoker    Smokeless tobacco: Never Used   Shirley Rodriguez Alcohol use No    Drug use: No    Sexual activity: No     Other Topics Concern    Not on file     Social History Narrative    No narrative on file      Family History   Problem Relation Age of Onset    Alzheimer's disease Mother     Stroke Father      Past Surgical History:   Procedure Laterality Date    CATARACT EXTRACTION      HERNIA REPAIR      RENAL ARTERY STENT      REPLACEMENT TOTAL KNEE BILATERAL         Current Outpatient Prescriptions:     bimatoprost (LUMIGAN) 0 01 % ophthalmic drops, Administer 1 drop to both eyes daily at bedtime  , Disp: , Rfl:     brimonidine-timolol (COMBIGAN) 0 2-0 5 %, Administer 1 drop to both eyes 2 (two) times a day, Disp: , Rfl:     magnesium gluconate (MAGONATE) 500 mg tablet, Take 500 mg by mouth daily, Disp: , Rfl:   No Known Allergies    Labs:  Lab Results   Component Value Date     06/12/2017    K 4 3 06/12/2017     06/12/2017    CO2 25 06/12/2017    BUN 20 06/12/2017    CREATININE 0 96 06/12/2017    GLUCOSE 98 06/12/2017    CALCIUM 9 5 06/12/2017     Lab Results   Component Value Date    CHOL 142 06/12/2017    TRIG 149 06/12/2017    HDL 34 (L) 06/12/2017     Imaging: No results found  EKG done today was reviewed: Normal sinus rhythm with no ST abnormalities    Review of Systems:  Review of Systems   Constitutional: Negative for chills, fatigue and fever  HENT: Negative for congestion, nosebleeds and postnasal drip  Respiratory: Negative for cough, chest tightness and shortness of breath  Cardiovascular: Negative for chest pain, palpitations and leg swelling  Gastrointestinal: Negative for abdominal distention, abdominal pain, diarrhea, nausea and vomiting  Endocrine: Negative for polydipsia, polyphagia and polyuria  Musculoskeletal: Negative for gait problem and myalgias  Skin: Negative for color change, pallor and rash  Allergic/Immunologic: Negative for environmental allergies, food allergies and immunocompromised state  Neurological: Negative for dizziness, seizures, syncope and light-headedness  Hematological: Negative for adenopathy  Does not bruise/bleed easily  Psychiatric/Behavioral: Negative for dysphoric mood  The patient is not nervous/anxious  Physical Exam:  /80 (BP Location: Left arm, Patient Position: Sitting, Cuff Size: Standard)   Pulse 61 Comment: apical  Ht 5' 11" (1 803 m)   Wt 79 4 kg (175 lb)   SpO2 98%   BMI 24 41 kg/m²    Physical Exam   Constitutional: He is oriented to person, place, and time  He appears well-developed  No distress  HENT:   Head: Normocephalic and atraumatic  Eyes: Conjunctivae and EOM are normal  Pupils are equal, round, and reactive to light  Neck: Neck supple  No JVD present  No thyromegaly present  Cardiovascular: Normal rate, regular rhythm, normal heart sounds and intact distal pulses  Exam reveals no gallop and no friction rub  No murmur heard  Pulmonary/Chest: Effort normal and breath sounds normal    Abdominal: Soft  He exhibits no distension  There is no tenderness  Musculoskeletal: He exhibits no edema  Neurological: He is alert and oriented to person, place, and time  No cranial nerve deficit  Skin: Skin is warm and dry  No rash noted  He is not diaphoretic  No erythema  Psychiatric: He has a normal mood and affect  His behavior is normal  Judgment and thought content normal        1  Aortic stenosis, moderate  POCT ECG   2  Benign hypertension          Discussion/Summary:  1  Chest pain - resolved  2  Aortic stenosis - review of echocardiogram shows mild disease only  Will repeat echocardiogram next year  No appreciable murmur on exam today  3  Hypertension - improved  Discussed low salt diet  Discussed risk factor reduction including refraining from smoking, eating a diet high in fruits and vegetables, maintaining a healthy weight, limiting screen time along with controlling BP and cholesterol    Encouraged to exercise 150 minutes a week at a moderate level such as a fast walk or 75 minutes of high intensity            Navtej Estela King DO  Please call with any questions or suggestions

## 2018-02-12 ENCOUNTER — OFFICE VISIT (OUTPATIENT)
Dept: NEUROLOGY | Facility: CLINIC | Age: 73
End: 2018-02-12
Payer: COMMERCIAL

## 2018-02-12 VITALS
WEIGHT: 172 LBS | DIASTOLIC BLOOD PRESSURE: 82 MMHG | HEIGHT: 71 IN | HEART RATE: 63 BPM | SYSTOLIC BLOOD PRESSURE: 152 MMHG | BODY MASS INDEX: 24.08 KG/M2

## 2018-02-12 DIAGNOSIS — R51.9 NEW ONSET OF HEADACHES: ICD-10-CM

## 2018-02-12 DIAGNOSIS — R41.3 MEMORY IMPAIRMENT: Primary | ICD-10-CM

## 2018-02-12 PROCEDURE — 99205 OFFICE O/P NEW HI 60 MIN: CPT | Performed by: PSYCHIATRY & NEUROLOGY

## 2018-02-12 RX ORDER — AMITRIPTYLINE HYDROCHLORIDE 25 MG/1
25 TABLET, FILM COATED ORAL
Qty: 30 TABLET | Refills: 2 | Status: SHIPPED | OUTPATIENT
Start: 2018-02-12 | End: 2018-03-12

## 2018-02-12 RX ORDER — ACETAMINOPHEN, ASPIRIN AND CAFFEINE 250; 250; 65 MG/1; MG/1; MG/1
1 TABLET, FILM COATED ORAL EVERY 6 HOURS PRN
COMMUNITY
End: 2018-08-17 | Stop reason: HOSPADM

## 2018-02-12 NOTE — PROGRESS NOTES
Outpatient Neurology History and Physical  Deborra Renetta  [id number]  88 y o   1945          Consult: Yes    Minor MD Trace      Chief Complaint   Patient presents with    Memory Loss    Headache           History Obtained from: Patient and wife     HPI:     Mr Abhinav Sanchez is a 73 yo M with PMH of HTN, aortic stenosis, depression presents with cc of memory loss and headaches  Patient and wife report difficulty with short term memory for past few months  He does have tendency to get upset over small things  He still drives without getting lost  He can help with his finances  He also reports headaches  He describes throbbing pain in bifrontal region at night and upon awakening  He denies any associated nausea or vomiting  He denies any light or noise sensitivity  He gets a headache almost everyday  It can last upto 1/2 and hour  He denies any sinus related symptoms  Denies recent illness  He takes otc excedrin  He has no h/o migraines when he was young  He says he gets upset when he can't do things he was able to do before  He used to be able to climb ladder and paint but is not able to anymore  His mother had Alzheimer's disease but he doesn't know at what age it started       Past Medical History:   Diagnosis Date    Aortic stenosis, moderate     Backache     Benign essential HTN     Cataract     Depression with anxiety     Diarrhea     DVT (deep venous thrombosis) ([facility])     Right Leg    Glaucoma     Memory loss     Prostate cancer ([facility])     PVD (peripheral vascular disease) ([facility])     Skin cancer of nose     Skin cancer of nose                Current Outpatient Prescriptions on File Prior to Visit   Medication Sig Dispense Refill    bimatoprost (LUMIGAN) 0 01 % ophthalmic drops Administer 1 drop to both eyes daily at bedtime        brimonidine-timolol (COMBIGAN) 0 2-0 5 % Administer 1 drop to both eyes 2 (two) times a day      magnesium gluconate (MAGONATE) 500 mg tablet Take 500 mg by mouth daily       No current facility-administered medications on file prior to visit  No Known Allergies      Family History   Problem Relation Age of Onset    Alzheimer's disease Mother     Stroke Father     Diabetes Sister                 Past Surgical History:   Procedure Laterality Date    CATARACT EXTRACTION      CYSTOSCOPY W/ URETERAL STENT PLACEMENT      HERNIA REPAIR      RENAL ARTERY STENT      REPLACEMENT TOTAL KNEE BILATERAL             Social History     Social History    Marital status: /Civil Union     Spouse name: N/A    Number of children: N/A    Years of education: N/A     Occupational History    Not on file       Social History Main Topics    Smoking status: Never Smoker    Smokeless tobacco: Never Used    Alcohol use No    Drug use: No    Sexual activity: No     Other Topics Concern    Not on file     Social History Narrative    No narrative on file       Review of Systems  Refer to positive review of systems in HPI  Constitutional- No fever, headache+  Eyes- No visual change  ENT- Hearing normal  CV- No chest pain  Resp- No Shortness of breath  GI- No diarrhea  - Bladder normal  MS- No Arthritis   Skin- No rash  Psych- No depression  Endo- No DM  Heme- No nodes    PHYSICAL EXAM:    Vitals:    02/12/18 1443   BP: 152/82   BP Location: Left arm   Patient Position: Sitting   Pulse: 63   Weight: 78 kg (172 lb)   Height: 5' 11" (1 803 m)         Appearance: No Acute Distress  Ophthalmoscopic: Disc Flat, Normal fundus  Carotid/Heart/Peripheral Vascular: No Bruits, RRR  Orientation: Awake, Alert, and Oriented to year, not month or date  Mental status:  Memory: Registation 3/3 Recall  0 5/3  MMSE: 21/30  Attention: Normal  Knowledge: Appropriate  Language: No aphasia  Speech: No dysarthria  Cranial Nerves:  2 No Visual Defect on Confrontation; Pupils round, equal, reactive to light  3,4,6 Extraocular Movements Intact; no nystagmus  5 Facial Sensation Intact  7 No facial asymmetry  8 hearing impaired   9,10 Palate symmetric, normal gag  11 Good shoulder shrug  12 Tongue Midline  Gait: Stable, No ataxia, can perform tandem walking  Coordination: No ataxia with finger to nose testing and heel to shin testing  Sensory: Intact, Symmetric to Pinprick, Light Touch, Vibration, and Joint Position  Muscle Tone: Normal  Muscle exam  Arm Right Left Leg Right Left   Deltoid 5/5 5/5 Iliopsoas 5/5 5/5   Biceps 5/5 5/5 Quads 5/5 5/5   Triceps 5/5 5/5 Hamstrings 5/5 5/5   Wrist Extension 5/5 5/5 Ankle Dorsi Flexion 5/5 5/5   Wrist Flexion 5/5 5/5 Ankle Plantar Flexion 5/5 5/5   Interossei 5/5 5/5 Ankle Eversion 5/5 5/5   APB 5/5 5/5 Ankle Inversion 5/5 5/5       Reflexes   RJ BJ TJ KJ AJ Plantars James's   Right 2+ 2+ 2+ 2+ 2+ Downgoing Not present   Left 2+ 2+ 2+ 2+ 2+ Downgoing Not present               Personal review of          None recent available for review     Assessment/Plan:     1  Memory impairment  MRI brain with and without contrast    Vitamin B12    TSH, 3rd generation with T4 reflex    Folate    RPR    Vitamin D 25 hydroxy    Ambulatory referral to Neuropsychology   2  New onset of headaches  MRI brain with and without contrast    amitriptyline (ELAVIL) 25 mg tablet           Patient does seem to have memory impairment likely age related  With h/o new onset of headaches will need to exclude structural pathology by obtaining MRI brain  Will check for reversible causes  For headaches, for now, will place him on elavil for prevention  Will refer him for detailed neuropsych evaluation  Counseling Documentation:  The patient and/or patient's family were  counseled regarding diagnostic results  Instructions for management,risk factor reductions,prognosis of disease were discussed  Patient and family were educated regarding impressions,risks and benefits of treatment options,importance of compliance with treatment          Total time of encounter: 60 min   More than 50% of time was spent in counseling and coordination of care of patient  [facility]KK[facility]  [facility] [address]  Arun Martinez 6

## 2018-02-13 ENCOUNTER — TRANSCRIBE ORDERS (OUTPATIENT)
Dept: LAB | Facility: CLINIC | Age: 73
End: 2018-02-13

## 2018-02-14 DIAGNOSIS — I10 ESSENTIAL HYPERTENSION: Primary | ICD-10-CM

## 2018-02-16 LAB
25(OH)D3+25(OH)D2 SERPL-MCNC: 33 NG/ML (ref 30–100)
BUN SERPL-MCNC: 24 MG/DL (ref 8–27)
CREAT SERPL-MCNC: 1.09 MG/DL (ref 0.76–1.27)
FOLATE SERPL-MCNC: >20 NG/ML
RPR SER QL: NON REACTIVE
SL AMB EGFR AFRICAN AMERICAN: 77 ML/MIN/1.73
SL AMB EGFR NON AFRICAN AMERICAN: 67 ML/MIN/1.73
TSH SERPL DL<=0.005 MIU/L-ACNC: 1.56 UIU/ML (ref 0.45–4.5)
VIT B12 SERPL-MCNC: 1228 PG/ML (ref 232–1245)

## 2018-02-28 ENCOUNTER — TELEPHONE (OUTPATIENT)
Dept: NEUROLOGY | Facility: CLINIC | Age: 73
End: 2018-02-28

## 2018-02-28 NOTE — TELEPHONE ENCOUNTER
He can stop taking elavil  When his side effects subside, he can call us to reconsider another agent for headaches  neuropsych testing is for memory evaluation  They can wait if they want to  MRI brain should be done however

## 2018-02-28 NOTE — TELEPHONE ENCOUNTER
Alexa Sigala called stating that she had to call the squad this morning as Tamara Rogers was lightheaded, confused, having chest pain with difficulty in breathing  She claims that the Amitriptyline has been making him very drowsy in the morning, and he does not feel well until mid morning  Also, she wanted to know if it was absolutely necessary that he go to [facility] for the neuropsych testing because they are on a fixed budget

## 2018-03-01 NOTE — TELEPHONE ENCOUNTER
I spoke to Mr  Sophiakeily Kline, and he will discontinue the Elavil now and call us when all of his side effects are gone to start another medication

## 2018-03-12 ENCOUNTER — TELEPHONE (OUTPATIENT)
Dept: OTHER | Facility: HOSPITAL | Age: 73
End: 2018-03-12

## 2018-03-12 ENCOUNTER — TELEPHONE (OUTPATIENT)
Dept: NEUROLOGY | Facility: CLINIC | Age: 73
End: 2018-03-12

## 2018-03-12 DIAGNOSIS — R51.9 HEADACHE: Primary | ICD-10-CM

## 2018-03-12 RX ORDER — VERAPAMIL HYDROCHLORIDE 120 MG/1
120 CAPSULE, EXTENDED RELEASE ORAL
Qty: 30 CAPSULE | Refills: 2 | Status: SHIPPED | OUTPATIENT
Start: 2018-03-12 | End: 2018-03-27 | Stop reason: SDUPTHER

## 2018-03-12 NOTE — TELEPHONE ENCOUNTER
I spoke to his wife  Will try him on Verapamil for headaches and he may resume melatonin to help him sleep  He's not a candidate for topamax because of memory disturbance and glaucoma

## 2018-03-22 ENCOUNTER — TELEPHONE (OUTPATIENT)
Dept: NEUROLOGY | Facility: CLINIC | Age: 73
End: 2018-03-22

## 2018-03-22 NOTE — TELEPHONE ENCOUNTER
Mrs  Calpine Anderson called and stated that she believes the Verapamil is helping as he is going to bed around 7:30-8:00 pm without any headache, however, is waking up between 2-3 am with one and it is lasting during the day  He also is experiencing constipation from the Verapamil as well  Also, she called [facility] for the neuropsych testing, and they are extremely booked in [place] so she was suppose to receive a call from a Sari  to schedule him in [place] back on March 9 but no one has returned her call

## 2018-03-22 NOTE — TELEPHONE ENCOUNTER
The patient will increase Verapamil to twice a day and will call [facility] to see the status of the authorization  She will have him start taking Magnesium Oxide 400 mg daily

## 2018-03-22 NOTE — TELEPHONE ENCOUNTER
He can take magnesium oxide 400mg daily to counteract side effect of verapamil  For memory testing, [facility] can be tried  I don't know of other locations which have more availability

## 2018-03-23 ENCOUNTER — OFFICE VISIT (OUTPATIENT)
Dept: FAMILY MEDICINE CLINIC | Facility: CLINIC | Age: 73
End: 2018-03-23
Payer: COMMERCIAL

## 2018-03-23 ENCOUNTER — TELEPHONE (OUTPATIENT)
Dept: FAMILY MEDICINE CLINIC | Facility: CLINIC | Age: 73
End: 2018-03-23

## 2018-03-23 ENCOUNTER — TRANSCRIBE ORDERS (OUTPATIENT)
Dept: LAB | Facility: CLINIC | Age: 73
End: 2018-03-23

## 2018-03-23 ENCOUNTER — APPOINTMENT (OUTPATIENT)
Dept: RADIOLOGY | Facility: CLINIC | Age: 73
End: 2018-03-23
Payer: COMMERCIAL

## 2018-03-23 VITALS
RESPIRATION RATE: 16 BRPM | TEMPERATURE: 97.4 F | BODY MASS INDEX: 23.52 KG/M2 | HEIGHT: 71 IN | WEIGHT: 168 LBS | HEART RATE: 60 BPM | DIASTOLIC BLOOD PRESSURE: 78 MMHG | SYSTOLIC BLOOD PRESSURE: 128 MMHG
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
## 2018-03-23 DIAGNOSIS — S09.90XA INJURY OF HEAD, INITIAL ENCOUNTER: ICD-10-CM

## 2018-03-23 DIAGNOSIS — M25.552 LEFT HIP PAIN: ICD-10-CM
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
## 2018-03-23 PROCEDURE — 99214 OFFICE O/P EST MOD 30 MIN: CPT | Performed by: NURSE PRACTITIONER

## 2018-03-23 PROCEDURE — 73502 X-RAY EXAM HIP UNI 2-3 VIEWS: CPT

## 2018-03-23 NOTE — PROGRESS NOTES
Assessment/Plan:    Problem List Items Addressed This Visit     None      Visit Diagnoses     Fall at home, initial encounter    -  Primary    Relevant Orders    XR hip/pelv 2-3 vws left if performed    Ambulatory referral to Orthopedic Surgery    Injury of head, initial encounter        Relevant Orders    Ambulatory referral to Orthopedic Surgery    Left hip pain        Relevant Orders    XR hip/pelv 2-3 vws left if performed      Note sent to Dr Ti Wilkes alert her to the fall  Patient's spouse will call her later today to discuss her recommendations  Encouraged patient to use his cane regularly  Does not have it with him  "it's in the truck"    Fall precautions    Referral to concussion center/balance center    rto 1 week for recheck    Call with new or worsening symptoms    Patient Instructions   Fall Prevention for Older Adults   AMBULATORY CARE:   Fall prevention  includes ways to make your home and other areas safer  It also includes ways you can move more carefully to prevent a fall  As you age, your muscles weaken and your risk for falls increases  Your risk also increases if you take medicines that make you sleepy or dizzy  You may also be at risk if you have vision or joint problems, have low blood pressure, or are not active  Call 911 or have someone else call if:   · You have fallen and are unconscious  · You have fallen and cannot move part of your body  Contact your healthcare provider if:   · You have fallen and have pain or a headache  · You have questions or concerns about your condition or care  Fall prevention tips:   · Stay active  Exercise can help strengthen your muscles and improve your balance  Your healthcare provider may recommend water aerobics, walking, or Eldon Chi  He may also recommend physical therapy to improve your coordination  Never start an exercise program without asking your healthcare provider first     · Wear shoes that fit well and have soles that     Wear shoes both inside and outside  Use slippers with good   Avoid shoes with high heels  · Use assistive devices as directed  Your healthcare provider may suggest that you use a cane or walker to help you keep your balance  You may need to have grab bars put in your bathroom near the toilet or in the shower  · Stand or sit up slowly  This may help you keep your balance and prevent falls  · Wear a personal alarm  This is a device that allows you to call 911 if you need help  Ask for more information on personal alarms  · Manage your medical conditions  Keep all appointments with your healthcare providers  Visit your eye doctor as directed  Home safety tips:   · Add items to prevent falls in the bathroom  Put nonslip strips on your bath or shower floor to prevent you from slipping  Use a bath mat if you do not have carpet in the bathroom  This will prevent you from falling when you step out of the bath or shower  Use a shower seat so you do not need to stand while you shower  Sit on the toilet or a chair in your bathroom to dry yourself and put on clothing  This will prevent you from losing your balance from drying or dressing yourself while you are standing  · Keep paths clear  Remove books, shoes, and other objects from walkways and stairs  Place cords for telephones and lamps out of the way so that you do not need to walk over them  Tape them down if you cannot move them  Remove small rugs  If you cannot remove a rug, secure it with double-sided tape  This will prevent you from tripping  · Install bright lights in your home  Use night lights to help light paths to the bathroom or kitchen  Always turn on the light before you start walking  · Keep items you use often on shelves within reach  Do not use a step stool to help you reach an item  · Paint or place reflective tape on the edges of your stairs  This will help you see the stairs better    Follow up with your healthcare provider as directed:  Write down your questions so you remember to ask them during your visits  © 2017 [address] [employer] Information is for End User's use only and may not be sold, redistributed or otherwise used for commercial purposes  All illustrations and images included in CareNotes® are the copyrighted property of [employer]  or Felipe Treviño  The above information is an  only  It is not intended as medical advice for individual conditions or treatments  Talk to your doctor, nurse or pharmacist before following any medical regimen to see if it is safe and effective for you  Return in about 1 week (around 3/30/2018)  Subjective:      Patient ID: Mindy Lobato is a 74 y o  male  Chief Complaint   Patient presents with   Cynthia Belch Fall     some time last week, left hip and back of head pain  headaches        Fell last Friday  Tripped when walking to his [employer]  [place] in grass  Hit left occipital region and left hip  Has worsening headaches  Contacted his neurologist, Dr Alberto Watts to report worsening headaches  Failed to inform her that this occurred after fall  He is currently being worked up for [place] issues  MRI of head performed several weeks ago  Fall   The accident occurred more than 1 week ago (patient and his spouse are poor historians  They can not recall exact day of fall  Thought maybe last thurs or friday)  Fall occurred: tripped while walking to [employer]  Fell on [employer] (yard) He landed on grass  There was no blood loss  The point of impact was the head and left hip  The pain is present in the head and left hip  The pain is at a severity of 5/10  The pain is moderate  The symptoms are aggravated by ambulation and pressure on injury  Associated symptoms include headaches  Pertinent negatives include no abdominal pain, bowel incontinence, fever, hematuria, loss of consciousness, nausea, numbness, tingling, visual change or vomiting   He has tried nothing for the symptoms  The following portions of the patient's history were reviewed and updated as appropriate: allergies, current medications, past family history, past medical history, past social history, past surgical history and problem list     Review of Systems   Constitutional: Positive for fatigue  Negative for chills, diaphoresis, fever and unexpected weight change  HENT: Negative  Eyes: Negative for visual disturbance  Respiratory: Negative  Cardiovascular: Negative  Gastrointestinal: Negative for abdominal pain, bowel incontinence, nausea and vomiting  Genitourinary: Negative for hematuria  Musculoskeletal: Positive for arthralgias  Skin: Negative for pallor and wound  Neurological: Positive for headaches  Negative for tingling, loss of consciousness, syncope, weakness, light-headedness and numbness  Psychiatric/Behavioral:        Short term memory loss         Current Outpatient Prescriptions   Medication Sig Dispense Refill    aspirin-acetaminophen-caffeine (EXCEDRIN EXTRA STRENGTH) 250-250-65 MG per tablet Take 1 tablet by mouth every 6 (six) hours as needed for headaches      bimatoprost (LUMIGAN) 0 01 % ophthalmic drops Administer 1 drop to both eyes daily at bedtime        brimonidine-timolol (COMBIGAN) 0 2-0 5 % Administer 1 drop to both eyes 2 (two) times a day      magnesium gluconate (MAGONATE) 500 mg tablet Take 500 mg by mouth daily      psyllium (METAMUCIL) 58 6 % powder Take 1 packet by mouth as needed      verapamil (VERELAN PM) 120 MG 24 hr capsule Take 1 capsule (120 mg total) by mouth daily after dinner 30 capsule 2     No current facility-administered medications for this visit          Objective:    /78 (BP Location: Left arm, Patient Position: Sitting, Cuff Size: Adult)   Pulse 60   Temp (!) 97 4 °F (36 3 °C)   Resp 16   Ht 5' 11" (1 803 m)   Wt 76 2 kg (168 lb)   BMI 23 43 kg/m²        Physical Exam   Constitutional: He appears well-developed and well-nourished  No distress  Eyes: Conjunctivae and EOM are normal  Pupils are equal, round, and reactive to light  No nystagimas   Neck: Normal range of motion  Neck supple  Cardiovascular: Normal rate, regular rhythm, normal heart sounds and intact distal pulses  Pulmonary/Chest: Effort normal and breath sounds normal  No respiratory distress  He exhibits no tenderness  Abdominal: He exhibits no distension  There is no tenderness  Musculoskeletal: Normal range of motion  Left hip: He exhibits tenderness  He exhibits normal range of motion and normal strength  Neurological: He is alert  He has normal strength  No cranial nerve deficit or sensory deficit  Gait normal    Alert and oriented x2     Positive Romberg   Skin: Skin is warm and dry  No pallor  Psychiatric: He has a normal mood and affect  Vitals reviewed               Jennifer Boyle, [address]

## 2018-03-23 NOTE — TELEPHONE ENCOUNTER
Just an FYI,   You have been following Sonya Dejesuser "dontrell" for worsening memory  I saw him today for fall with injury  He fell on left occipital region and hip in a grassy area approx last Friday  Wife reports that she called your office about worsening headaches but did not meant that headaches worsened after fall  MRI was done not so long ago  He does not have any neuro changes and is not on blood thinner  I did not order a CT scan unless you feel warranted        [address]

## 2018-03-23 NOTE — PATIENT INSTRUCTIONS
Fall Prevention for Older Adults   AMBULATORY CARE:   Fall prevention  includes ways to make your home and other areas safer  It also includes ways you can move more carefully to prevent a fall  As you age, your muscles weaken and your risk for falls increases  Your risk also increases if you take medicines that make you sleepy or dizzy  You may also be at risk if you have vision or joint problems, have low blood pressure, or are not active  Call 911 or have someone else call if:   · You have fallen and are unconscious  · You have fallen and cannot move part of your body  Contact your healthcare provider if:   · You have fallen and have pain or a headache  · You have questions or concerns about your condition or care  Fall prevention tips:   · Stay active  Exercise can help strengthen your muscles and improve your balance  Your healthcare provider may recommend water aerobics, walking, or Eldon Chi  He may also recommend physical therapy to improve your coordination  Never start an exercise program without asking your healthcare provider first     · Wear shoes that fit well and have soles that   Wear shoes both inside and outside  Use slippers with good   Avoid shoes with high heels  · Use assistive devices as directed  Your healthcare provider may suggest that you use a cane or walker to help you keep your balance  You may need to have grab bars put in your bathroom near the toilet or in the shower  · Stand or sit up slowly  This may help you keep your balance and prevent falls  · Wear a personal alarm  This is a device that allows you to call 911 if you need help  Ask for more information on personal alarms  · Manage your medical conditions  Keep all appointments with your healthcare providers  Visit your eye doctor as directed  Home safety tips:   · Add items to prevent falls in the bathroom  Put nonslip strips on your bath or shower floor to prevent you from slipping   Use a bath mat if you do not have carpet in the bathroom  This will prevent you from falling when you step out of the bath or shower  Use a shower seat so you do not need to stand while you shower  Sit on the toilet or a chair in your bathroom to dry yourself and put on clothing  This will prevent you from losing your balance from drying or dressing yourself while you are standing  · Keep paths clear  Remove books, shoes, and other objects from walkways and stairs  Place cords for telephones and lamps out of the way so that you do not need to walk over them  Tape them down if you cannot move them  Remove small rugs  If you cannot remove a rug, secure it with double-sided tape  This will prevent you from tripping  · Install bright lights in your home  Use night lights to help light paths to the bathroom or kitchen  Always turn on the light before you start walking  · Keep items you use often on shelves within reach  Do not use a step stool to help you reach an item  · Paint or place reflective tape on the edges of your stairs  This will help you see the stairs better  Follow up with your healthcare provider as directed:  Write down your questions so you remember to ask them during your visits  © 2017 [address] [employer] Information is for End User's use only and may not be sold, redistributed or otherwise used for commercial purposes  All illustrations and images included in CareNotes® are the copyrighted property of [employer] M [employer]  or Felipe Treviño  The above information is an  only  It is not intended as medical advice for individual conditions or treatments  Talk to your doctor, nurse or pharmacist before following any medical regimen to see if it is safe and effective for you

## 2018-03-27 DIAGNOSIS — R51.9 INTRACTABLE HEADACHE, UNSPECIFIED CHRONICITY PATTERN, UNSPECIFIED HEADACHE TYPE: ICD-10-CM

## 2018-03-27 RX ORDER — VERAPAMIL HYDROCHLORIDE 120 MG/1
120 CAPSULE, EXTENDED RELEASE ORAL 2 TIMES DAILY
Qty: 60 CAPSULE | Refills: 1 | Status: SHIPPED | OUTPATIENT
Start: 2018-03-27 | End: 2018-05-09 | Stop reason: SINTOL

## 2018-03-29 ENCOUNTER — OFFICE VISIT (OUTPATIENT)
Dept: OBGYN CLINIC | Facility: CLINIC | Age: 73
End: 2018-03-29
Payer: COMMERCIAL

## 2018-03-29 VITALS
SYSTOLIC BLOOD PRESSURE: 130 MMHG | DIASTOLIC BLOOD PRESSURE: 72 MMHG | HEIGHT: 70 IN | HEART RATE: 80 BPM | WEIGHT: 166 LBS | BODY MASS INDEX: 23.77 KG/M2
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
## 2018-03-29 DIAGNOSIS — S09.90XA INJURY OF HEAD, INITIAL ENCOUNTER: ICD-10-CM

## 2018-03-29 DIAGNOSIS — S06.0X0A CONCUSSION WITHOUT LOSS OF CONSCIOUSNESS, INITIAL ENCOUNTER: Primary | ICD-10-CM
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
## 2018-03-29 PROCEDURE — 99203 OFFICE O/P NEW LOW 30 MIN: CPT | Performed by: ORTHOPAEDIC SURGERY

## 2018-03-29 NOTE — PROGRESS NOTES
Assessment/Plan:  1  Concussion without loss of consciousness, initial encounter     2  Fall at home, initial encounter  Ambulatory referral to Orthopedic Surgery   3  Injury of head, initial encounter  Ambulatory referral to [address] [place] [address] Drive, [address] had a fall injury 1 month ago where he hit his head and has had increased headache ever since  He does report some symptoms today but states that the only symptom that seems to have worsened since the fall is the headache itself  Is not completely clear whether he suffered a concussion or not but we will proceed with treatment under the impression that he had a suffered a mild concussion  I did discuss treatment of concussion avoiding exacerbating factors as well as trying supplementation with fish oil can help reduce the headaches  I think most of his discomfort is coming from pain involving the trapezius muscle and spasming following the fall  We discussed moist heat treatment and stretching exercises for this as well  I will see him back in 3 weeks for repeat evaluation  If symptoms persist or worsen we could consider referral to the concussion Center  Patient ID: Cori Shields is a 74 y o  male  Injury Description:  Date / Time:  1 month ago  Injury Description:  Ambreen Brand reports an injury where he was walking around his yd and lost his balance and fell landing on the posterior and left side of his head  He had symptoms of headache but denies any loss of consciousness  He denies any anterograde or retrograde amnesia  He did not go to the emergency room or have a CT scan  He noticed after the head injury that he had slightly increased symptoms of headache  The headache has been posterior and radiating to the forehead  It worsens with movement of the head but seems to be happening intermittently  He denies any other symptoms of dizziness or fatigue  He does not have any difficulty concentrating    He was referred to see me for evaluation for concussion today by his family physician  Location:  Frontal, Left temporal and Occipital  Cause:  Fall  LOC:  no  Amnesia:   Retrograde:  no   Anterograde:  no   Early Signs:  Headache  Seizures:  No  ER Visit: No  CT Scan:  No     Concussion Risk Factors:  History of Concussion: No  History of Migraines: Yes, history of headache seeing Neurology, currently taking verapamil  Family History of Headache:  No  Developmental History:  none  Psychiatric History:  none  History of Sleep Disorder:  No  Do symptoms worsen with Physical Activity? No  Do symptoms worsen with Cognitive Activity? No  What percent is this person back to normal?  Patient 90 %        Review of Systems  Past Medical History:   Diagnosis Date    Aortic stenosis, moderate     Backache     Benign essential HTN     Cataract     Depression with anxiety     Diarrhea     DVT (deep venous thrombosis) (HCC)     Right Leg    Glaucoma     Memory loss     Prostate cancer (HCC)     PVD (peripheral vascular disease) (HCC)     Skin cancer of nose     Skin cancer of nose        Past Surgical History:   Procedure Laterality Date    CATARACT EXTRACTION      CYSTOSCOPY W/ URETERAL STENT PLACEMENT      HERNIA REPAIR      RENAL ARTERY STENT      REPLACEMENT TOTAL KNEE BILATERAL         Family History   Problem Relation Age of Onset    Alzheimer's disease Mother     Stroke Father     Diabetes Sister        Social History     Occupational History    Not on file       Social History Main Topics    Smoking status: Never Smoker    Smokeless tobacco: Never Used    Alcohol use No    Drug use: No    Sexual activity: No         Current Outpatient Prescriptions:     aspirin-acetaminophen-caffeine (EXCEDRIN EXTRA STRENGTH) 250-250-65 MG per tablet, Take 1 tablet by mouth every 6 (six) hours as needed for headaches, Disp: , Rfl:     bimatoprost (LUMIGAN) 0 01 % ophthalmic drops, Administer 1 drop to both eyes daily at bedtime  , Disp: , Rfl:    brimonidine-timolol (COMBIGAN) 0 2-0 5 %, Administer 1 drop to both eyes 2 (two) times a day, Disp: , Rfl:     magnesium gluconate (MAGONATE) 500 mg tablet, Take 500 mg by mouth daily, Disp: , Rfl:     psyllium (METAMUCIL) 58 6 % powder, Take 1 packet by mouth as needed, Disp: , Rfl:     verapamil (VERELAN PM) 120 MG 24 hr capsule, Take 1 capsule (120 mg total) by mouth 2 (two) times a day, Disp: 60 capsule, Rfl: 1    No Known Allergies    Objective:  Vitals:    03/29/18 1045   BP: 130/72   Pulse: 80       Ortho Exam    Physical Exam    General:   NAD:  Yes  Psych:   AAOX3:  Yes   Mood and Affect:  Normal  HEENT:   Lacerations:  No   Bruising:  No   PEERLA:  Yes   Fracture/Trauma:  No  Neuro:   CNII - XII Intact:  No   Examination of Coordination:    Limited Balance:   Yes, apparently at baseline secondary to knee pain    Romberg:   Normal    FTN:  Normal    Diadochokinesia: Normal      Vestibular Ocular:     Gaze stability:   EOMI:  Yes    Nystagmus:  no   Saccades: no

## 2018-04-05 ENCOUNTER — TELEPHONE (OUTPATIENT)
Dept: NEUROLOGY | Facility: CLINIC | Age: 73
End: 2018-04-05

## 2018-04-05 NOTE — TELEPHONE ENCOUNTER
[facility]  Our other option is topamax to be taken in 25mg increment upto 100mg  Will send it in

## 2018-04-05 NOTE — TELEPHONE ENCOUNTER
Mrs Evans Usha called and his headaches are still persisting, and his constipation is much worse since starting the Verapamil  He is taking the magnesium with no help

## 2018-04-06 DIAGNOSIS — R51.9 HEADACHE: Primary | ICD-10-CM

## 2018-04-06 RX ORDER — TOPIRAMATE 100 MG/1
100 TABLET, FILM COATED ORAL
Qty: 30 TABLET | Refills: 1 | Status: SHIPPED | OUTPATIENT
Start: 2018-04-28 | End: 2018-05-09 | Stop reason: SINTOL

## 2018-04-06 RX ORDER — TOPIRAMATE 25 MG/1
TABLET ORAL
Qty: 42 TABLET | Refills: 0 | Status: SHIPPED | OUTPATIENT
Start: 2018-04-06 | End: 2018-05-09 | Stop reason: SINTOL

## 2018-04-06 NOTE — TELEPHONE ENCOUNTER
I spoke to Mrs Romualdo Gilford and advised her to have Marisa Vargas d/c the Verapamil, and she would like you to send a prescription in for Topamax

## 2018-04-19 ENCOUNTER — OFFICE VISIT (OUTPATIENT)
Dept: OBGYN CLINIC | Facility: CLINIC | Age: 73
End: 2018-04-19
Payer: COMMERCIAL

## 2018-04-19 VITALS — WEIGHT: 162 LBS | BODY MASS INDEX: 23.19 KG/M2 | HEIGHT: 70 IN

## 2018-04-19 DIAGNOSIS — M79.671 PAIN IN RIGHT FOOT: ICD-10-CM

## 2018-04-19 DIAGNOSIS — S06.0X0D CONCUSSION WITHOUT LOSS OF CONSCIOUSNESS, SUBSEQUENT ENCOUNTER: Primary | ICD-10-CM

## 2018-04-19 PROCEDURE — 99213 OFFICE O/P EST LOW 20 MIN: CPT | Performed by: ORTHOPAEDIC SURGERY

## 2018-04-19 NOTE — PROGRESS NOTES
Assessment / Plan     No diagnosis found  Begin RTP:  {YES /MV:09085}  Work Restrictions:  {YES [id number]    ***    {DISCUSSED [id number]           Subjective       Patient ID:  Avi Alexander is a 74 y o  male    ***    Change in Symptoms:  {Concussion Change in Symptoms:14528}  Explain:  ***  Back to School Status:  {Concussion School:11825}  Current Physical Activity:  {Concussion Current Physical Activity:27531}  Recent Grades / Tests: ***  Subsequent Injuries:  {Concussion Subsequent Injuries:21008}  Do symptoms worsen with Physical Activity? {YES /DL:88375}  Do symptoms worsen with Cognitive Activity? {YES /CW:57402}  Overall Rating:  What percent is this person back to normal?  {Concussion Ratin}  Response to Meds:  {Response to Meds:98021}    Review of Systems  Past Medical History:   Diagnosis Date    Aortic stenosis, moderate     Backache     Benign essential HTN     Cataract     Depression with anxiety     Diarrhea     DVT (deep venous thrombosis) (HCC)     Right Leg    Glaucoma     Memory loss     Prostate cancer ([address] )     [facility]peripheral [facility] disease) [facility])     Skin cancer of nose     Skin cancer of nose        Past Surgical History:   Procedure Laterality Date    CATARACT EXTRACTION      CYSTOSCOPY W/ URETERAL STENT PLACEMENT      HERNIA REPAIR      RENAL ARTERY STENT      REPLACEMENT TOTAL KNEE BILATERAL         Family History   Problem Relation Age of Onset    Alzheimer's disease Mother     Stroke Father     Diabetes Sister        Social History     Occupational History    Not on file       Social History Main Topics    Smoking status: Never Smoker    Smokeless tobacco: Never Used    Alcohol use No    Drug use: No    Sexual activity: No         Current Outpatient Prescriptions:     aspirin-acetaminophen-caffeine (EXCEDRIN EXTRA STRENGTH) 250-250-65 MG per tablet, Take 1 tablet by mouth every 6 (six) hours as needed for headaches, Disp: , Rfl:    bimatoprost (LUMIGAN) 0 01 % ophthalmic drops, Administer 1 drop to both eyes daily at bedtime  , Disp: , Rfl:     brimonidine-timolol (COMBIGAN) 0 2-0 5 %, Administer 1 drop to both eyes 2 (two) times a day, Disp: , Rfl:     magnesium gluconate (MAGONATE) 500 mg tablet, Take 500 mg by mouth daily, Disp: , Rfl:     psyllium (METAMUCIL) 58 6 % powder, Take 1 packet by mouth as needed, Disp: , Rfl:     [START ON 2018] topiramate (TOPAMAX) 100 mg tablet, Take 1 tablet (100 mg total) by mouth daily at bedtime Starting , Disp: 30 tablet, Rfl: 1    topiramate (TOPAMAX) 25 mg tablet, Take 1 tab at bed time for one week then 2 tabs at bedtime for 2nd week then 3 tabs at bedtime  , Disp: 42 tablet, Rfl: 0    verapamil (VERELAN PM) 120 MG 24 hr capsule, Take 1 capsule (120 mg total) by mouth 2 (two) times a day, Disp: 60 capsule, Rfl: 1    No Known Allergies        IMPACT report reviewed  See printout        Physical Exam     Ht 5' 10" (1 778 m)   Wt 73 5 kg (162 lb)   BMI 23 24 kg/m²     Objective:    Ortho Exam    Physical Exam    General:   NAD:  {YES /AR:32758}  Psych:   AAOX3:  {YES /XL:23712}   Mood and Affect:  {Normal Abnormal:55141}  HEENT:   Lacerations:  {Yes No Location:25627}   Bruising:  {Yes No Location:49720}   PEERLA:  {YES /TO:13204}   Fracture/Trauma:  {YES /Q}  Neuro:   CNII - XII Intact:  {YES /UL:13722}   Examination of Coordination:    Limited Balance:   {YES /UM:21440}    Romberg:   {Normal Abnormal:70778}    Forward Tandem Gait:   {Normal Abnormal:81169}    Backward Tandem Gait:   {Normal Abnormal:02090}    Eyes Close Tandem Gait:   {Normal Abnormal:72556}    FTN:  {Normal Abnormal:65251}    Diadochokinesia: {Normal Abnormal:47842}      Vestibular Ocular:     Gaze stability:   EOMI:  {YES /PD:10878}    Nystagmus:  {no/horizontal/vertical:61440}   Saccades: {no/horizontal/vertical:96058}   Convergence:  ***cm

## 2018-04-19 NOTE — PROGRESS NOTES
Assessment/Plan:  1  Concussion without loss of consciousness, subsequent encounter     2  Pain in right foot  Ambulatory referral to [facility] continues to experience intermittent headache  I do not think his headaches are related to a concussion at this time  I would like him to continue seeing Dr Vicky Sloan to treat his chronic headaches which seems to proceed the concussion  I have given him a referral to podiatry to discuss his right sided foot pain further, I think he would benefit from new custom orthotics  Subjective:   Analisa Howell is a 74 y o  male who returns to the office today for follow up concussion  He was last seen  3 weeks ago with symptoms consistent with a concussion  Since he was last seen he states he is feeling better and overall improving but is still experiencing headaches  He has seen Dr Vicky Sloan who has been adjusting his medications  He is scheduled to follow up with Dr Vicky Sloan in about 2 weeks  He denies any new injury or trauma since he was last seen  He also mentioned he has been having right sided foot pain for the past few months  He does have a history of foot pain and was being treated by a podiatrist in the past        Review of Systems   Constitutional: Negative for chills, fever and unexpected weight change  HENT: Positive for hearing loss  Negative for nosebleeds and sore throat  Eyes: Negative for pain, redness and visual disturbance  Respiratory: Negative for cough, shortness of breath and wheezing  Cardiovascular: Negative for chest pain, palpitations and leg swelling  Gastrointestinal: Negative for abdominal pain, nausea and vomiting  Endocrine: Negative for polyphagia and polyuria  Genitourinary: Negative for dysuria and hematuria  Musculoskeletal: Positive for arthralgias and myalgias  See HPI   Skin: Negative for rash and wound  Neurological: Positive for headaches  Negative for dizziness and numbness     Psychiatric/Behavioral: Negative for decreased concentration and suicidal ideas  The patient is nervous/anxious  Past Medical History:   Diagnosis Date    Aortic stenosis, moderate     Backache     Benign essential HTN     Cataract     Depression with anxiety     Diarrhea     DVT (deep venous thrombosis) (HCC)     Right Leg    Glaucoma     Memory loss     Prostate cancer (HCC)     PVD (peripheral vascular disease) (HCC)     Skin cancer of nose     Skin cancer of nose        Past Surgical History:   Procedure Laterality Date    CATARACT EXTRACTION      CYSTOSCOPY W/ URETERAL STENT PLACEMENT      HERNIA REPAIR      RENAL ARTERY STENT      REPLACEMENT TOTAL KNEE BILATERAL         Family History   Problem Relation Age of Onset    Alzheimer's disease Mother     Stroke Father     Diabetes Sister        Social History     Occupational History    Not on file  Social History Main Topics    Smoking status: Never Smoker    Smokeless tobacco: Never Used    Alcohol use No    Drug use: No    Sexual activity: No         Current Outpatient Prescriptions:     aspirin-acetaminophen-caffeine (EXCEDRIN EXTRA STRENGTH) 250-250-65 MG per tablet, Take 1 tablet by mouth every 6 (six) hours as needed for headaches, Disp: , Rfl:     bimatoprost (LUMIGAN) 0 01 % ophthalmic drops, Administer 1 drop to both eyes daily at bedtime  , Disp: , Rfl:     brimonidine-timolol (COMBIGAN) 0 2-0 5 %, Administer 1 drop to both eyes 2 (two) times a day, Disp: , Rfl:     magnesium gluconate (MAGONATE) 500 mg tablet, Take 500 mg by mouth daily, Disp: , Rfl:     psyllium (METAMUCIL) 58 6 % powder, Take 1 packet by mouth as needed, Disp: , Rfl:     [START ON 4/28/2018] topiramate (TOPAMAX) 100 mg tablet, Take 1 tablet (100 mg total) by mouth daily at bedtime Starting 4/28, Disp: 30 tablet, Rfl: 1    topiramate (TOPAMAX) 25 mg tablet, Take 1 tab at bed time for one week then 2 tabs at bedtime for 2nd week then 3 tabs at bedtime  , Disp: 42 tablet, Rfl: 0    verapamil (VERELAN PM) 120 MG 24 hr capsule, Take 1 capsule (120 mg total) by mouth 2 (two) times a day, Disp: 60 capsule, Rfl: 1    No Known Allergies    Objective: There were no vitals filed for this visit  Right Ankle Exam     Tenderness   Right ankle tenderness location: Navicular                 Physical Exam   Constitutional: He is oriented to person, place, and time  He appears well-developed  HENT:   Head: Normocephalic and atraumatic  Eyes: Conjunctivae are normal    Neck: Neck supple  Cardiovascular: Intact distal pulses  Pulmonary/Chest: Effort normal    Abdominal: Soft  Neurological: He is alert and oriented to person, place, and time  Skin: Skin is warm and dry  Psychiatric: He has a normal mood and affect  His behavior is normal    Vitals reviewed

## 2018-04-23 ENCOUNTER — DOCUMENTATION (OUTPATIENT)
Dept: NEUROLOGY | Facility: CLINIC | Age: 73
End: 2018-04-23

## 2018-04-23 NOTE — PROGRESS NOTES
The patient's wife called and stated that he is having a lot of side effects from Topamax, lights bothering his eyes, having to wear sunglasses inside, glare bothers his eyes, sores on his legs, back, and around eyes as well as left nostril bleeding  They stopped the medication last night and will call me by the end of the week when side effects have cleared up  Gabapentin 100 mg hs up to 3 at night will be prescribed if still having headaches

## 2018-04-27 NOTE — PROGRESS NOTES
Mrs Romualdo Gilford called back today, 04/27 and states that his sores are clearing up, is less constipated, but is still having daily headaches so would like the new prescription called in to [facility]  I called Gabapentin 100 mg hs x 3 days, then 2 hs x 3 days, then 3 hs to continue #90 with 1 refill

## 2018-05-07 ENCOUNTER — OFFICE VISIT (OUTPATIENT)
Dept: PODIATRY | Facility: CLINIC | Age: 73
End: 2018-05-07
Payer: COMMERCIAL

## 2018-05-07 ENCOUNTER — TELEPHONE (OUTPATIENT)
Dept: FAMILY MEDICINE CLINIC | Facility: CLINIC | Age: 73
End: 2018-05-07

## 2018-05-07 VITALS
RESPIRATION RATE: 17 BRPM | WEIGHT: 162 LBS | HEIGHT: 70 IN | HEART RATE: 65 BPM | BODY MASS INDEX: 23.19 KG/M2 | DIASTOLIC BLOOD PRESSURE: 80 MMHG | SYSTOLIC BLOOD PRESSURE: 129 MMHG

## 2018-05-07 DIAGNOSIS — M77.42 METATARSALGIA OF BOTH FEET: ICD-10-CM

## 2018-05-07 DIAGNOSIS — M21.961 ACQUIRED DEFORMITY OF RIGHT FOOT: Primary | ICD-10-CM

## 2018-05-07 DIAGNOSIS — I70.209 PERIPHERAL ARTERIOSCLEROSIS (HCC): ICD-10-CM

## 2018-05-07 DIAGNOSIS — Q66.50 CONGENITAL PES PLANUS, UNSPECIFIED LATERALITY: ICD-10-CM

## 2018-05-07 DIAGNOSIS — M77.41 METATARSALGIA OF BOTH FEET: ICD-10-CM

## 2018-05-07 PROCEDURE — 73620 X-RAY EXAM OF FOOT: CPT | Performed by: PODIATRIST

## 2018-05-07 PROCEDURE — 99203 OFFICE O/P NEW LOW 30 MIN: CPT | Performed by: PODIATRIST

## 2018-05-07 RX ORDER — GABAPENTIN 100 MG/1
100 CAPSULE ORAL 3 TIMES DAILY
COMMUNITY
End: 2018-07-03

## 2018-05-07 NOTE — TELEPHONE ENCOUNTER
Pt  Of Dr Leonardo Gr would like to talk to Lisa Horan  Katherine Green continues to have headaches and had reactions from 2 of the meds Dr Jeremias Moran prescribed  Wife wants to know if seeing a chiropractor would help and would like to see Dr Valentino Harvest

## 2018-05-07 NOTE — PROGRESS NOTES
Assessment/Plan:  Pain  Deformity  Callus  Peripheral artery disease  Plan  X-rays taken  Lesion debrided  Patient may need molded ankle-foot orthotic  No problem-specific Assessment & Plan notes found for this encounter      Review of Systems   Constitutional: Positive for fatigue  Negative for chills, diaphoresis, fever and unexpected weight change  HENT: Negative  Eyes: Negative for visual disturbance  Respiratory: Negative  Cardiovascular: Negative  Gastrointestinal: Negative for abdominal pain, bowel incontinence, nausea and vomiting  Genitourinary: Negative for hematuria  Musculoskeletal: Positive for arthralgias  Skin: Negative for pallor and wound  Neurological: Positive for headaches  Negative for tingling, loss of consciousness, syncope, weakness, light-headedness and numbn     There are no diagnoses linked to this encounter  Subjective:      Patient ID: James Dial is a 74 y o  male  Patient complains of pain in his right foot  Patient has a painful lesion on the bottom of the foot  No history of trauma  Lesion has been there for months  The following portions of the patient's history were reviewed and updated as appropriate: allergies, current medications, past family history, past medical history, past social history, past surgical history and problem list     Review of Systems      Objective: Foot Exam    General  General Appearance: appears stated age and healthy   Orientation: alert and oriented to person, place, and time   Affect: appropriate   Gait: antalgic       Right Foot/Ankle     Inspection and Palpation  Ecchymosis: none  Tenderness: metatarsals and navicular   Swelling: metatarsals   Arch: pes planus  Hammertoes: fifth toe  Hallux valgus: yes  Hallux limitus: yes  Skin Exam: callus;      Neurovascular  Dorsalis pedis: 1+  Posterior tibial: 1+  Saphenous nerve sensation: diminished  Tibial nerve sensation: diminished  Superficial peroneal nerve sensation: diminished  Deep peroneal nerve sensation: diminished  Sural nerve sensation: diminished  Achilles reflex: 1+    Muscle Strength  Ankle dorsiflexion: 4    Tests  Too many toes: positive     Comments  Patient has medial collapse of the right foot arch  There is secondary corn formation under the navicular  X-ray demonstrates osteopenia  Severe    Left Foot/Ankle      Inspection and Palpation  Ecchymosis: none  Tenderness: none   Swelling: metatarsals   Arch: pes planus  Hammertoes: fifth toe  Hallux valgus: yes  Hallux limitus: yes  Skin Exam: callus; Neurovascular  Dorsalis pedis: 1+  Posterior tibial: 1+  Saphenous nerve sensation: diminished  Tibial nerve sensation: diminished  Superficial peroneal nerve sensation: diminished  Deep peroneal nerve sensation: diminished  Sural nerve sensation: diminished  Achilles reflex: 1+    Muscle Strength  Ankle dorsiflexion: 4    Tests  Too many toes: positive     Comments  Rearfoot arthrosis noted QA 9 findings bilateral   Negative digital hair  Positive mycosis of nail  Physical Exam   Cardiovascular:   Pulses:       Dorsalis pedis pulses are 1+ on the right side, and 1+ on the left side  Posterior tibial pulses are 1+ on the right side, and 1+ on the left side  Feet:   Right Foot:   Skin Integrity: Positive for callus  Left Foot:   Skin Integrity: Positive for callus  Neurological:   Reflex Scores:       Achilles reflexes are 1+ on the right side and 1+ on the left side

## 2018-05-07 NOTE — TELEPHONE ENCOUNTER
Dr Moris Miller advised that Dr Gonzalo Jeffrey should refer to chiropractor if he feels like that would help- he is following with him now for this problem  If he had reaction to the meds Anshul should be aware of this too  tc/cma

## 2018-05-09 ENCOUNTER — OFFICE VISIT (OUTPATIENT)
Dept: NEUROLOGY | Facility: CLINIC | Age: 73
End: 2018-05-09
Payer: COMMERCIAL

## 2018-05-09 VITALS
HEIGHT: 70 IN | BODY MASS INDEX: 22.76 KG/M2 | SYSTOLIC BLOOD PRESSURE: 137 MMHG | DIASTOLIC BLOOD PRESSURE: 85 MMHG | WEIGHT: 159 LBS

## 2018-05-09 DIAGNOSIS — R51.9 DAILY HEADACHE: Primary | ICD-10-CM

## 2018-05-09 DIAGNOSIS — R41.3 MEMORY IMPAIRMENT: ICD-10-CM

## 2018-05-09 PROCEDURE — 99214 OFFICE O/P EST MOD 30 MIN: CPT | Performed by: PSYCHIATRY & NEUROLOGY

## 2018-05-09 RX ORDER — PREDNISONE 20 MG/1
TABLET ORAL
Qty: 25 TABLET | Refills: 0 | Status: SHIPPED | OUTPATIENT
Start: 2018-05-09 | End: 2018-07-03

## 2018-05-09 NOTE — PROGRESS NOTES
Outpatient Neurology History and Physical  Alvin Bennett  [id number]  1 y o   1945          Consult: Yes    Sonja Singh MD      Chief Complaint   Patient presents with    Headache           History Obtained from: Patient and wife     HPI:     Mr Li Mohr is a 75 yo M with PMH of HTN, aortic stenosis, depression presents as follow up for memory loss and headaches  Patient and wife report difficulty with short term memory for past few months  He does have tendency to get upset over small things  He still drives without getting lost  He can help with his finances  He also reports headaches  He describes throbbing pain in bifrontal region at night and upon awakening  He denies any associated nausea or vomiting  He denies any light or noise sensitivity  He gets a headache almost everyday  It can last upto 1/2 and hour  He denies any sinus related symptoms  Since last visit, we have tried verapamil, elavil but he had side effects or they were ineffective  Most recent one was gabapentin low dose  Nothing has helped  All the preventive medications were helping for first few days but then effect wears off  He had MRI brain since prior visit and it revealed mild volume atrophy and small vessel disease without any significant abnormality  His mother had Alzheimer's disease but he doesn't know at what age it started       Past Medical History:   Diagnosis Date    Aortic stenosis, moderate     Backache     Benign essential HTN     Cataract     Depression with anxiety     Diarrhea     DVT [facility]deep venous thrombosis) ([facility]     Right Leg    Glaucoma     Memory loss     Prostate cancer (HCC)     PVD (peripheral vascular disease) (HCC)     Skin cancer of nose     Skin cancer of nose                Current Outpatient Prescriptions on File Prior to Visit   Medication Sig Dispense Refill    aspirin-acetaminophen-caffeine (EXCEDRIN EXTRA STRENGTH) 250-250-65 MG per tablet Take 1 tablet by mouth every 6 (six) hours as needed for headaches      bimatoprost (LUMIGAN) 0 01 % ophthalmic drops Administer 1 drop to both eyes daily at bedtime        brimonidine-timolol (COMBIGAN) 0 2-0 5 % Administer 1 drop to both eyes 2 (two) times a day      gabapentin (NEURONTIN) 100 mg capsule Take 100 mg by mouth 3 (three) times a day      magnesium gluconate (MAGONATE) 500 mg tablet Take 500 mg by mouth daily      psyllium (METAMUCIL) 58 6 % powder Take 1 packet by mouth as needed      [DISCONTINUED] amitriptyline (ELAVIL) 25 mg tablet Take 1 tablet (25 mg total) by mouth daily at bedtime 30 tablet 2    [DISCONTINUED] topiramate (TOPAMAX) 100 mg tablet Take 1 tablet (100 mg total) by mouth daily at bedtime Starting 4/28 30 tablet 1    [DISCONTINUED] topiramate (TOPAMAX) 25 mg tablet Take 1 tab at bed time for one week then 2 tabs at bedtime for 2nd week then 3 tabs at bedtime  42 tablet 0    [DISCONTINUED] verapamil (VERELAN PM) 120 MG 24 hr capsule Take 1 capsule (120 mg total) by mouth 2 (two) times a day 60 capsule 1     No current facility-administered medications on file prior to visit  No Known Allergies      Family History   Problem Relation Age of Onset    Alzheimer's disease Mother     Stroke Father     Diabetes Sister                 Past Surgical History:   Procedure Laterality Date    CATARACT EXTRACTION      CYSTOSCOPY W/ URETERAL STENT PLACEMENT      HERNIA REPAIR      RENAL ARTERY STENT      REPLACEMENT TOTAL KNEE BILATERAL             Social History     Social History    Marital status: /Civil Union     Spouse name: N/A    Number of children: N/A    Years of education: N/A     Occupational History    Not on file       Social History Main Topics    Smoking status: Never Smoker    Smokeless tobacco: Never Used    Alcohol use No    Drug use: No    Sexual activity: No     Other Topics Concern    Not on file     Social History Narrative    No narrative on file       Review of Systems  Refer to positive review of systems in HPI  Constitutional- No fever, headache+  Eyes- No visual change  ENT- Hearing normal  CV- No chest pain  Resp- No Shortness of breath  GI- No diarrhea  - Bladder normal  MS- No Arthritis   Skin- No rash  Psych- No depression  Endo- No DM  Heme- No nodes    PHYSICAL EXAM:    Vitals:    05/09/18 1058   BP: 137/85   BP Location: Right arm   Patient Position: Sitting   Cuff Size: Standard   Weight: 72 1 kg (159 lb)   Height: 5' 10" (1 778 m)         Appearance: No Acute Distress  Ophthalmoscopic: Disc Flat, Normal fundus  Carotid/Heart/Peripheral Vascular: No Bruits, RRR  Orientation: Awake, Alert, and Oriented to year, not month or date  Mental status:  Memory: Registation 3/3 Recall  0 5/3  MMSE: 21/30  Attention: Normal  Knowledge: Appropriate  Language: No aphasia  Speech: No dysarthria  Cranial Nerves:  2 No Visual Defect on Confrontation; Pupils round, equal, reactive to light  3,4,6 Extraocular Movements Intact; no nystagmus  5 Facial Sensation Intact  7 No facial asymmetry  8 hearing impaired   9,10 Palate symmetric, normal gag  11 Good shoulder shrug  12 Tongue Midline  Gait: Stable, No ataxia, can perform tandem walking  Coordination: No ataxia with finger to nose testing and heel to shin testing  Sensory: Intact, Symmetric to Pinprick, Light Touch, Vibration, and Joint Position  Muscle Tone: Normal  Muscle exam  Arm Right Left Leg Right Left   Deltoid 5/5 5/5 Iliopsoas 5/5 5/5   Biceps 5/5 5/5 Quads 5/5 5/5   Triceps 5/5 5/5 Hamstrings 5/5 5/5   Wrist Extension 5/5 5/5 Ankle Dorsi Flexion 5/5 5/5   Wrist Flexion 5/5 5/5 Ankle Plantar Flexion 5/5 5/5   Interossei 5/5 5/5 Ankle Eversion 5/5 5/5   APB 5/5 5/5 Ankle Inversion 5/5 5/5       Reflexes   RJ BJ TJ KJ AJ Plantars James's   Right 2+ 2+ 2+ 2+ 2+ Downgoing Not present   Left 2+ 2+ 2+ 2+ 2+ Downgoing Not present               Personal review of          None recent available for review Assessment/Plan:     1  Daily headache  Sedimentation rate, automated    C-reactive protein    Lyme disease, western blot    predniSONE 20 mg tablet   2  Memory impairment         Unclear etiology of his headaches  We have tried multiple preventive option but have not been successful  Will try him on course of prednisone  Will check sed rate, cpr to r/o TA  Will check for lyme as well  Tomorrow is his annual eye exam which will help r/o intraocular pathology  I still recommend  neuropsych evaluation if insurance approves  Counseling Documentation:  The patient and/or patient's family were  counseled regarding diagnostic results  Instructions for management,risk factor reductions,prognosis of disease were discussed  Patient and family were educated regarding impressions,risks and benefits of treatment options,importance of compliance with treatment  Total time of encounter: 30 min   More than 50% of time was spent in counseling and coordination of care of patient  MIKKI Christianson    [facility]  [facility] [address]  Arun [address]

## 2018-05-11 LAB
B BURGDOR IGG PATRN SER IB-IMP: NEGATIVE
B BURGDOR IGM PATRN SER IB-IMP: NEGATIVE
B BURGDOR18KD IGG SER QL IB: ABNORMAL
B BURGDOR23KD IGG SER QL IB: ABNORMAL
B BURGDOR23KD IGM SER QL IB: ABNORMAL
B BURGDOR28KD IGG SER QL IB: ABNORMAL
B BURGDOR30KD IGG SER QL IB: ABNORMAL
B BURGDOR39KD IGG SER QL IB: ABNORMAL
B BURGDOR39KD IGM SER QL IB: ABNORMAL
B BURGDOR41KD IGG SER QL IB: PRESENT
B BURGDOR41KD IGM SER QL IB: ABNORMAL
B BURGDOR45KD IGG SER QL IB: ABNORMAL
B BURGDOR58KD IGG SER QL IB: PRESENT
B BURGDOR66KD IGG SER QL IB: ABNORMAL
B BURGDOR93KD IGG SER QL IB: ABNORMAL
CRP SERPL-MCNC: 0.8 MG/L (ref 0–4.9)
ERYTHROCYTE [SEDIMENTATION RATE] IN BLOOD BY WESTERGREN METHOD: 2 MM/HR (ref 0–30)

## 2018-06-11 ENCOUNTER — OFFICE VISIT (OUTPATIENT)
Dept: PODIATRY | Facility: CLINIC | Age: 73
End: 2018-06-11
Payer: COMMERCIAL

## 2018-06-11 VITALS
SYSTOLIC BLOOD PRESSURE: 139 MMHG | HEART RATE: 62 BPM | BODY MASS INDEX: 22.76 KG/M2 | HEIGHT: 70 IN | RESPIRATION RATE: 16 BRPM | DIASTOLIC BLOOD PRESSURE: 74 MMHG | WEIGHT: 159 LBS

## 2018-06-11 DIAGNOSIS — B35.1 ONYCHOMYCOSIS: ICD-10-CM

## 2018-06-11 DIAGNOSIS — M79.672 PAIN IN BOTH FEET: ICD-10-CM

## 2018-06-11 DIAGNOSIS — M79.671 PAIN IN BOTH FEET: ICD-10-CM

## 2018-06-11 DIAGNOSIS — M21.961 ACQUIRED DEFORMITY OF RIGHT FOOT: Primary | ICD-10-CM

## 2018-06-11 DIAGNOSIS — M79.671 RIGHT FOOT PAIN: ICD-10-CM

## 2018-06-11 DIAGNOSIS — I70.209 PERIPHERAL ARTERIOSCLEROSIS (HCC): ICD-10-CM

## 2018-06-11 PROCEDURE — 29540 STRAPPING ANKLE &/FOOT: CPT | Performed by: PODIATRIST

## 2018-06-11 PROCEDURE — 11721 DEBRIDE NAIL 6 OR MORE: CPT | Performed by: PODIATRIST

## 2018-06-11 NOTE — PROGRESS NOTES
Assessment/Plan:  Pain upon ambulation  Deformity  Pes planus  Mycosis of nail  Callus formation  Peripheral artery disease  Plan  All mycotic nails debrided without pain or complication  Calluses debrided as well  Patient's right arch was bound a low dye arch strapping fashion  There are no diagnoses linked to this encounter  Subjective:   Patient ID: Neil Monday is a 74 y o  male      Past Medical History:   Diagnosis Date    Aortic stenosis, moderate     Backache     Benign essential HTN     Cataract     Depression with anxiety     Diarrhea     [facility]     Right Leg    Glaucoma     Memory loss     Prostate cancer (HCC)     [facility]) [facility]     Skin cancer of nose     Skin cancer of nose        Past Surgical History:   Procedure Laterality Date    CATARACT EXTRACTION      CYSTOSCOPY W/ URETERAL STENT PLACEMENT      HERNIA REPAIR      RENAL ARTERY STENT      REPLACEMENT TOTAL KNEE BILATERAL         No Known Allergies      Current Outpatient Prescriptions:     aspirin-acetaminophen-caffeine (EXCEDRIN EXTRA STRENGTH) 250-250-65 MG per tablet, Take 1 tablet by mouth every 6 (six) hours as needed for headaches, Disp: , Rfl:     bimatoprost (LUMIGAN) 0 01 % ophthalmic drops, Administer 1 drop to both eyes daily at bedtime  , Disp: , Rfl:     brimonidine-timolol (COMBIGAN) 0 2-0 5 %, Administer 1 drop to both eyes 2 (two) times a day, Disp: , Rfl:     gabapentin (NEURONTIN) 100 mg capsule, Take 100 mg by mouth 3 (three) times a day, Disp: , Rfl:     magnesium gluconate (MAGONATE) 500 mg tablet, Take 500 mg by mouth daily, Disp: , Rfl:     predniSONE 20 mg tablet, Take 1 tab twice daily with food for 5 days, then one and half tablets daily for 3 days then 1 tablet daily for 3 days, then half a tablet daily for 3 days and then stop , Disp: 25 tablet, Rfl: 0    psyllium (METAMUCIL) 58 6 % powder, Take 1 packet by mouth as needed, Disp: , Rfl:     Patient Active Problem List   Diagnosis    Benign hypertension    Aortic stenosis, moderate    Acquired deformity of right foot    Peripheral arteriosclerosis ([address] )    Metatarsalgia of both feet    Congenital pes planus       HPI    The following portions of the patient's history were reviewed and updated as appropriate: allergies, current medications, past family history, past medical history, past social history, past surgical history and problem list     Review of Systems         Review of Systems   Constitutional: Positive for fatigue  Negative for chills, diaphoresis, fever and unexpected weight change  HENT: Negative     Eyes: Negative for visual disturbance  Respiratory: Negative     Cardiovascular: Negative     Gastrointestinal: Negative for abdominal pain, bowel incontinence, nausea and vomiting  Genitourinary: Negative for hematuria  Musculoskeletal: Positive for arthralgias  Skin: Negative for pallor and wound  Neurological: Positive for headaches  Negative for tingling, loss of consciousness, syncope, weakness, light-headedness and numbn      There are no diagnoses linked to this encounter        Subjective:       Patient ID: Neil Monday is a 74 y o  male      Patient complains of pain in his right foot  Patient has a painful lesion on the bottom of the foot  No history of trauma    Lesion has been there for months         The following portions of the patient's history were reviewed and updated as appropriate: allergies, current medications, past family history, past medical history, past social history, past surgical history and problem list      Review of Systems       Objective:      Foot Exam     General  General Appearance: appears stated age and healthy   Orientation: alert and oriented to person, place, and time   Affect: appropriate   Gait: antalgic         Right Foot/Ankle      Inspection and Palpation  Ecchymosis: none  Tenderness: metatarsals and navicular   Swelling: metatarsals   Arch: pes planus  Hammertoes: fifth toe  Hallux valgus: yes  Hallux limitus: yes  Skin Exam: callus;      Neurovascular  Dorsalis pedis: 1+  Posterior tibial: 1+  Saphenous nerve sensation: diminished  Tibial nerve sensation: diminished  Superficial peroneal nerve sensation: diminished  Deep peroneal nerve sensation: diminished  Sural nerve sensation: diminished  Achilles reflex: 1+     Muscle Strength  Ankle dorsiflexion: 4     Tests  Too many toes: positive      Comments  Patient has medial collapse of the right foot arch  There is secondary corn formation under the navicular      X-ray demonstrates osteopenia  Severe     Left Foot/Ankle       Inspection and Palpation  Ecchymosis: none  Tenderness: none   Swelling: metatarsals   Arch: pes planus  Hammertoes: fifth toe  Hallux valgus: yes  Hallux limitus: yes  Skin Exam: callus;      Neurovascular  Dorsalis pedis: 1+  Posterior tibial: 1+  Saphenous nerve sensation: diminished  Tibial nerve sensation: diminished  Superficial peroneal nerve sensation: diminished  Deep peroneal nerve sensation: diminished  Sural nerve sensation: diminished  Achilles reflex: 1+     Muscle Strength  Ankle dorsiflexion: 4     Tests  Too many toes: positive      Comments  Rearfoot arthrosis noted QA 9 findings bilateral   Negative digital hair  Positive mycosis of nail  Physical Exam   Cardiovascular:   Pulses:       Dorsalis pedis pulses are 1+ on the right side, and 1+ on the left side  Posterior tibial pulses are 1+ on the right side, and 1+ on the left side  Feet:   Right Foot:   Skin Integrity: Positive for callus  Left Foot:   Skin Integrity: Positive for callus  Neurological:   Reflex Scores:       Achilles reflexes are 1+ on the right side and 1+ on the left side  Objective:      Foot ExamPhysical Exam

## 2018-07-03 ENCOUNTER — HOSPITAL ENCOUNTER (EMERGENCY)
Facility: HOSPITAL | Age: 73
Discharge: HOME | End: 2018-07-03
Attending: EMERGENCY MEDICINE | Admitting: EMERGENCY MEDICINE
Payer: COMMERCIAL

## 2018-07-03 VITALS
OXYGEN SATURATION: 99 % | TEMPERATURE: 98.8 F | DIASTOLIC BLOOD PRESSURE: 70 MMHG | WEIGHT: 158.95 LBS | RESPIRATION RATE: 17 BRPM | BODY MASS INDEX: 22.25 KG/M2 | SYSTOLIC BLOOD PRESSURE: 160 MMHG | HEIGHT: 71 IN | HEART RATE: 54 BPM

## 2018-07-03 DIAGNOSIS — R51.9 HEADACHE: Primary | ICD-10-CM

## 2018-07-03 DIAGNOSIS — T67.5XXA HEAT EXHAUSTION: ICD-10-CM

## 2018-07-03 LAB
ALBUMIN SERPL BCP-MCNC: 3.5 G/DL (ref 3.5–5)
ALP SERPL-CCNC: 58 U/L (ref 46–116)
ALT SERPL W P-5'-P-CCNC: 18 U/L (ref 12–78)
ANION GAP SERPL CALCULATED.3IONS-SCNC: 6 MMOL/L (ref 4–13)
AST SERPL W P-5'-P-CCNC: 21 U/L (ref 5–45)
BASOPHILS # BLD AUTO: 0.05 THOUSANDS/ΜL (ref 0–0.1)
BASOPHILS NFR BLD AUTO: 1 % (ref 0–1)
BILIRUB SERPL-MCNC: 0.4 MG/DL (ref 0.2–1)
BUN SERPL-MCNC: 23 MG/DL (ref 5–25)
CALCIUM SERPL-MCNC: 8.9 MG/DL (ref 8.3–10.1)
CHLORIDE SERPL-SCNC: 106 MMOL/L (ref 100–108)
CO2 SERPL-SCNC: 27 MMOL/L (ref 21–32)
CREAT SERPL-MCNC: 1.04 MG/DL (ref 0.6–1.3)
EOSINOPHIL # BLD AUTO: 0.25 THOUSAND/ΜL (ref 0–0.61)
EOSINOPHIL NFR BLD AUTO: 4 % (ref 0–6)
ERYTHROCYTE [DISTWIDTH] IN BLOOD BY AUTOMATED COUNT: 15.9 % (ref 11.6–15.1)
GFR SERPL CREATININE-BSD FRML MDRD: 70 ML/MIN/1.73SQ M
GLUCOSE SERPL-MCNC: 100 MG/DL (ref 65–140)
HCT VFR BLD AUTO: 34.7 % (ref 36.5–49.3)
HGB BLD-MCNC: 11.1 G/DL (ref 12–17)
IMM GRANULOCYTES # BLD AUTO: 0.02 THOUSAND/UL (ref 0–0.2)
IMM GRANULOCYTES NFR BLD AUTO: 0 % (ref 0–2)
LYMPHOCYTES # BLD AUTO: 1.18 THOUSANDS/ΜL (ref 0.6–4.47)
LYMPHOCYTES NFR BLD AUTO: 21 % (ref 14–44)
MCH RBC QN AUTO: 26.9 PG (ref 26.8–34.3)
MCHC RBC AUTO-ENTMCNC: 32 G/DL (ref 31.4–37.4)
MCV RBC AUTO: 84 FL (ref 82–98)
MONOCYTES # BLD AUTO: 0.5 THOUSAND/ΜL (ref 0.17–1.22)
MONOCYTES NFR BLD AUTO: 9 % (ref 4–12)
NEUTROPHILS # BLD AUTO: 3.63 THOUSANDS/ΜL (ref 1.85–7.62)
NEUTS SEG NFR BLD AUTO: 65 % (ref 43–75)
NRBC BLD AUTO-RTO: 0 /100 WBCS
PLATELET # BLD AUTO: 197 THOUSANDS/UL (ref 149–390)
PMV BLD AUTO: 10.5 FL (ref 8.9–12.7)
POTASSIUM SERPL-SCNC: 4.1 MMOL/L (ref 3.5–5.3)
PROT SERPL-MCNC: 6.5 G/DL (ref 6.4–8.2)
RBC # BLD AUTO: 4.12 MILLION/UL (ref 3.88–5.62)
SODIUM SERPL-SCNC: 139 MMOL/L (ref 136–145)
WBC # BLD AUTO: 5.63 THOUSAND/UL (ref 4.31–10.16)

## 2018-07-03 PROCEDURE — 96374 THER/PROPH/DIAG INJ IV PUSH: CPT

## 2018-07-03 PROCEDURE — 96375 TX/PRO/DX INJ NEW DRUG ADDON: CPT

## 2018-07-03 PROCEDURE — 36415 COLL VENOUS BLD VENIPUNCTURE: CPT | Performed by: EMERGENCY MEDICINE

## 2018-07-03 PROCEDURE — 99284 EMERGENCY DEPT VISIT MOD MDM: CPT

## 2018-07-03 PROCEDURE — 80053 COMPREHEN METABOLIC PANEL: CPT | Performed by: EMERGENCY MEDICINE

## 2018-07-03 PROCEDURE — 85025 COMPLETE CBC W/AUTO DIFF WBC: CPT | Performed by: EMERGENCY MEDICINE

## 2018-07-03 PROCEDURE — 96361 HYDRATE IV INFUSION ADD-ON: CPT

## 2018-07-03 PROCEDURE — 93005 ELECTROCARDIOGRAM TRACING: CPT

## 2018-07-03 RX ORDER — METOCLOPRAMIDE HYDROCHLORIDE 5 MG/ML
10 INJECTION INTRAMUSCULAR; INTRAVENOUS ONCE
Status: COMPLETED | OUTPATIENT
Start: 2018-07-03 | End: 2018-07-03

## 2018-07-03 RX ORDER — KETOROLAC TROMETHAMINE 30 MG/ML
30 INJECTION, SOLUTION INTRAMUSCULAR; INTRAVENOUS ONCE
Status: COMPLETED | OUTPATIENT
Start: 2018-07-03 | End: 2018-07-03

## 2018-07-03 RX ADMIN — SODIUM CHLORIDE 1000 ML: 0.9 INJECTION, SOLUTION INTRAVENOUS at 15:27

## 2018-07-03 RX ADMIN — METOCLOPRAMIDE 10 MG: 5 INJECTION, SOLUTION INTRAMUSCULAR; INTRAVENOUS at 15:34

## 2018-07-03 RX ADMIN — KETOROLAC TROMETHAMINE 30 MG: 30 INJECTION, SOLUTION INTRAMUSCULAR at 15:33

## 2018-07-03 NOTE — DISCHARGE INSTRUCTIONS
General Headache   AMBULATORY CARE:   Headache pain  may be mild or severe  Common causes include stress, medicines, and head injuries  Sleep problems, allergies, and hormone changes can also cause a headache  You may have frequent headaches that have no clear cause  Pain may start in another part of your body and move to your head  Headache pain can also move to other parts of your body  A headache can cause other symptoms, such as nausea and vomiting  A severe headache may be a sign of a stroke or other serious problem that needs immediate treatment  Call 911 for any of the following:   · You have any of the following signs of a stroke:      ¨ Numbness or drooping on one side of your face     ¨ Weakness in an arm or leg    ¨ Confusion or difficulty speaking    ¨ Dizziness, a severe headache, or vision loss    Seek care immediately if:   · You have a headache with neck stiffness and a fever  · You have a constant headache and are vomiting  · You have severe pain that does not get better after you take pain medicine  · You have a headache and the pain worsens when you look into light  · You have a headache and vision changes, such as blurred vision  · You have a headache and are forgetful or confused  Contact your healthcare provider if:   · You have a headache each day that does not get better, even after treatment  · You have changes in your headaches, or new symptoms that occur when you have a headache  · Others you live or work with also have headaches  · You have questions or concerns about your condition or care  Treatment  may include any of the following:  · Medicines  may be given to prevent or treat headache pain  Do not wait until the pain is severe to take your medicine  Ask your healthcare provider how to take the medicine safely  · NSAIDs , such as ibuprofen, help decrease swelling, pain, and fever  This medicine is available with or without a doctor's order  NSAIDs can cause stomach bleeding or kidney problems in certain people  If you take blood thinner medicine, always ask if NSAIDs are safe for you  Always read the medicine label and follow directions  Do not give these medicines to children under 10months of age without direction from your child's healthcare provider  · Acetaminophen  decreases pain and fever  It is available without a doctor's order  Ask how much to take and how often to take it  Follow directions  Read the labels of all other medicines you are using to see if they also contain acetaminophen, or ask your doctor or pharmacist  Acetaminophen can cause liver damage if not taken correctly  Do not use more than 4 grams (4,000 milligrams) total of acetaminophen in one day  · Antinausea medicine  may be given to calm your stomach and help prevent vomiting  Manage your symptoms:   · Rest in a dark and quiet room  This may help decrease your pain  · Apply heat or ice as directed  Heat or ice may help decrease pain or muscle spasms  Apply heat or ice on the area for 20 minutes every 2 hours for as many days as directed  Your healthcare provider may recommend that you alternate heat and ice  · Relax your muscles to help relieve a headache  Lie down in a comfortable position and close your eyes  Relax your muscles slowly  Start at your toes and work your way up your body  A massage or warm bath may also help relax your muscles  Keep a headache record:  Record the dates and times that you get headaches, and what you were doing before the headache started  Also record what you ate and drank in the 24 hours before the headache started  This might help your healthcare provider find the cause of your headaches and make a treatment plan  The record can also help you avoid headache triggers or manage your symptoms  Get enough sleep:  You should get 8 to 10 hours of sleep each night  Create a sleep schedule   Go to bed and wake up at the same times each day  It may be helpful to do something relaxing before bed  Do not watch television right before bed  Do not smoke:  Nicotine and other chemicals in cigarettes and cigars can trigger a headache or make it worse  Ask your healthcare provider for information if you currently smoke and need help to quit  E-cigarettes or smokeless tobacco still contain nicotine  Talk to your healthcare provider before you use these products  Drink liquids as directed: You may need to drink more liquid to prevent dehydration  Dehydration can cause a headache  Ask your healthcare provider how much liquid to drink each day and which liquids are best for you  Limit caffeine and alcohol as directed: Your headaches may be triggered by caffeine or alcohol  You may also develop a headache if you drink caffeine regularly and suddenly stop  Eat a variety of healthy foods:  Do not skip meals  Too little food can trigger a headache  Include fruits, vegetables, whole-grain breads, low-fat dairy products, beans, lean meat, and fish  Do not have trigger foods, such as chocolate and red wine  Foods that contain gluten, nitrates, MSG, or artificial sweeteners may also trigger a headache  Follow up with your healthcare provider as directed:  Write down your questions so you remember to ask them during your visits  © 2017 [facility] Information is for End User's use only and may not be sold, redistributed or otherwise used for commercial purposes  All illustrations and images included in CareNotes® are the copyrighted property of [employer]  or Felipe Treviño  The above information is an  only  It is not intended as medical advice for individual conditions or treatments  Talk to your doctor, nurse or pharmacist before following any medical regimen to see if it is safe and effective for you  Heat Exhaustion   WHAT YOU NEED TO KNOW:   Heat exhaustion is when your body overheats   Normally, the body has a cooling system that is controlled by the brain  The cooling system adjusts to hot conditions and lowers your body temperature by producing sweat  With heat exhaustion, the body's cooling system is not working well and results in an increased body temperature  DISCHARGE INSTRUCTIONS:   Call 911 for any of the following:   · You have trouble breathing  · You are confused or cannot think clearly  · You cannot move your arms and legs  Seek care immediately if:   · You cannot stop vomiting  Contact your healthcare provider if:   · Your signs and symptoms do not improve with treatment  · You have numbness or prickling feeling in your arms or legs  · You have questions or concerns about your condition or care  First aid for heat exhaustion:   · Move to an air-conditioned location or a cool, shady area and lie down  Raise your legs above the level of your heart  · Drink cold liquid, such as water or a sports drink  · Mist yourself with cold water or pour cool water on your head, neck, and clothes  · Loosen or remove as many clothes as possible  · If you do not feel better in 1 hour, go to the emergency department  Prevent heat exhaustion:   · Wear lightweight, loose, and light-colored clothing  · Protect your head and neck with a hat or umbrella when you are outdoors  · Drink lots of water or sports drinks  Avoid alcohol  · Eat salty foods, such as salted crackers, and salted pretzels  · Limit your activities during the hottest time of the day  This is usually late morning through early afternoon  · Use air conditioners or fans and have enough proper ventilation  If there is no air conditioning available, keep your windows open so air can circulate  Follow up with your healthcare provider as directed:  Write down your questions so you remember to ask them during your visits     © 2017 [employer] Information is for End User's use only and may not be sold, redistributed or otherwise used for commercial purposes  All illustrations and images included in CareNotes® are the copyrighted property of [employer]  or Felipe Treviño  The above information is an  only  It is not intended as medical advice for individual conditions or treatments  Talk to your doctor, nurse or pharmacist before following any medical regimen to see if it is safe and effective for you

## 2018-07-05 ENCOUNTER — VBI (OUTPATIENT)
Dept: ADMINISTRATIVE | Facility: OTHER | Age: 73
End: 2018-07-05

## 2018-07-05 LAB
ATRIAL RATE: 56 BPM
P AXIS: 52 DEGREES
PR INTERVAL: 188 MS
QRS AXIS: 12 DEGREES
QRSD INTERVAL: 84 MS
QT INTERVAL: 406 MS
QTC INTERVAL: 391 MS
T WAVE AXIS: 42 DEGREES
VENTRICULAR RATE: 56 BPM

## 2018-07-05 PROCEDURE — 93010 ELECTROCARDIOGRAM REPORT: CPT | Performed by: INTERNAL MEDICINE

## 2018-07-06 NOTE — ED PROVIDER NOTES
History  Chief Complaint   Patient presents with    Headache     Patient had migraine at home followed by altered mental state according to his wife who called EMS       History provided by:  Patient and spouse   used: No    Migraine   Location:  Diffuse  Quality:  Dull  Severity:  Moderate  Onset quality:  Gradual  Timing:  Constant  Progression:  Unchanged  Chronicity:  Recurrent  Context:  While working outside in the head  Relieved by:  Cooling  Worsened by:  Heat  Ineffective treatments:  Nothing tried  Associated symptoms: fatigue, headaches and nausea    Associated symptoms: no abdominal pain, no chest pain, no congestion, no cough, no diarrhea, no ear pain, no fever, no loss of consciousness, no myalgias, no rash, no rhinorrhea, no shortness of breath, no sore throat, no vomiting and no wheezing    Risk factors:  Elderly, heat exposure, h/o migraines      Prior to Admission Medications   Prescriptions Last Dose Informant Patient Reported?  Taking?   aspirin-acetaminophen-caffeine ([address] 8057) 902-329-65 MG per tablet  Spouse/Significant Other Yes Yes   Sig: Take 1 tablet by mouth every 6 (six) hours as needed for headaches   bimatoprost (LUMIGAN) 0 01 % ophthalmic drops  Self Yes Yes   Sig: Administer 1 drop to both eyes daily at bedtime     brimonidine-timolol (COMBIGAN) 0 2-0 5 %  Self Yes Yes   Sig: Administer 1 drop to both eyes 2 (two) times a day      Facility-Administered Medications: None       Past Medical History:   Diagnosis Date    Abnormal blood chemistry 09/21/2009    Aortic stenosis, moderate     Arthritis 05/09/2011    localized primary osteoarthritis of lower leg    Backache     Benign essential HTN     last assessed 12/28/17    Cataract     Depression with anxiety     Diarrhea     DVT (deep venous thrombosis) ([facility]) 09/09/2011    Right Leg    Generalized anxiety disorder 08/10/2009    Glaucoma     last assesssed 2/11/13    Hearing problem     History of herniated intervertebral disc 04/28/2004    Memory loss     Pro[facility] cancer [facility] [facility] )     PVD (peripheral vascular disease) ([facility] [address] [facility] ) 04/28/2004    Skin cancer of nose     Skin cancer of nose        Past Surgical History:   Procedure Laterality Date    BUNIONECTOMY      simple bunion exostectomy (silver procedure)    CATARACT EXTRACTION      resolved 2012    CYSTOSCOPY W/ URETERAL STENT PLACEMENT      EYE SURGERY Bilateral     laser, left-2012 , right-2015    FRACTURE SURGERY      spinal, resolved 8/15/10    HERNIA REPAIR      inguinal sliding    OTHER SURGICAL HISTORY      needle cath placement for brachyther applic into genitalia    PROSTATE BIOPSY      needle bx    RENAL ARTERY STENT      REPLACEMENT TOTAL KNEE BILATERAL      right-2012 , left-2016       Family History   Problem Relation Age of Onset    Alzheimer's disease Mother     Stroke Father         CVA    Diabetes Sister      I have reviewed and agree with the history as documented  Social History   Substance Use Topics    Smoking status: Never Smoker    Smokeless tobacco: Never Used    Alcohol use No        Review of Systems   Constitutional: Positive for fatigue  Negative for chills, diaphoresis and fever  HENT: Negative for congestion, ear pain, rhinorrhea, sore throat, trouble swallowing and voice change  Eyes: Negative for photophobia, pain, redness and visual disturbance  Respiratory: Negative for cough, chest tightness, shortness of breath and wheezing  Cardiovascular: Negative for chest pain  Gastrointestinal: Positive for nausea  Negative for abdominal pain, diarrhea and vomiting  Endocrine: Negative for polydipsia, polyphagia and polyuria  Genitourinary: Negative for difficulty urinating and flank pain  Musculoskeletal: Negative for myalgias and neck pain  Skin: Negative for color change, pallor, rash and wound  Allergic/Immunologic: Negative for immunocompromised state     Neurological: Positive for light-headedness and headaches  Negative for dizziness, tremors, seizures, loss of consciousness, syncope, facial asymmetry, speech difficulty, weakness and numbness  Psychiatric/Behavioral: Positive for confusion  The patient is nervous/anxious           Felt "off" while outside in the heat, mildly confused - improved when brought back into cool house       Physical Exam  Physical Exam    Vital Signs  ED Triage Vitals   Temperature Pulse Respirations Blood Pressure SpO2   07/03/18 1443 07/03/18 1443 07/03/18 1443 07/03/18 1443 07/03/18 1443   98 8 °F (37 1 °C) 55 18 (!) 177/76 99 %      Temp Source Heart Rate Source Patient Position - Orthostatic VS BP Location FiO2 (%)   07/03/18 1443 07/03/18 1443 07/03/18 1443 07/03/18 1443 --   Oral Monitor Lying Left arm       Pain Score       07/03/18 1533       4           Vitals:    07/03/18 1443 07/03/18 1645   BP: (!) 177/76 160/70   Pulse: 55 (!) 54   Patient Position - Orthostatic VS: Lying        Visual Acuity      ED Medications  Medications   sodium chloride 0 9 % bolus 1,000 mL (0 mL Intravenous Stopped 7/3/18 1627)   metoclopramide (REGLAN) injection 10 mg (10 mg Intravenous Given 7/3/18 1534)   ketorolac (TORADOL) injection 30 mg (30 mg Intravenous Given 7/3/18 1533)       Diagnostic Studies  Results Reviewed     Procedure Component Value Units Date/Time    Comprehensive metabolic panel [[id number]] Collected:  07/03/18 1524    Lab Status:  Final result Specimen:  Blood from Arm, Right Updated:  07/03/18 1549     Sodium 139 mmol/L      Potassium 4 1 mmol/L      Chloride 106 mmol/L      CO2 27 mmol/L      Anion Gap 6 mmol/L      BUN 23 mg/dL      Creatinine 1 04 mg/dL      Glucose 100 mg/dL      Calcium 8 9 mg/dL      AST 21 U/L      ALT 18 U/L      Alkaline Phosphatase 58 U/L      Total Protein 6 5 g/dL      Albumin 3 5 g/dL      Total Bilirubin 0 40 mg/dL      eGFR 70 ml/min/1 73sq m     Narrative:         National Kidney Disease Education Program recommendations are as follows:  GFR calculation is accurate only with a steady state creatinine  Chronic Kidney disease less than 60 ml/min/1 73 sq  meters  Kidney failure less than 15 ml/min/1 73 sq  meters      CBC and differential [[id number]]  (Abnormal) Collected:  07/03/18 1524    Lab Status:  Final result Specimen:  Blood from Arm, Right Updated:  07/03/18 1532     WBC 5 63 Thousand/uL      RBC 4 12 Million/uL      Hemoglobin 11 1 (L) g/dL      Hematocrit 34 7 (L) %      MCV 84 fL      MCH 26 9 pg      MCHC 32 0 g/dL      RDW 15 9 (H) %      MPV 10 5 fL      Platelets 521 Thousands/uL      nRBC 0 /100 WBCs      Neutrophils Relative 65 %      Immat GRANS % 0 %      Lymphocytes Relative 21 %      Monocytes Relative 9 %      Eosinophils Relative 4 %      Basophils Relative 1 %      Neutrophils Absolute 3 63 Thousands/µL      Immature Grans Absolute 0 02 Thousand/uL      Lymphocytes Absolute 1 18 Thousands/µL      Monocytes Absolute 0 50 Thousand/µL      Eosinophils Absolute 0 25 Thousand/µL      Basophils Absolute 0 05 Thousands/µL                  No orders to display              Procedures  Procedures       Phone Contacts  ED Phone Contact    ED Course                               MDM  Number of Diagnoses or Management Options  Headache: established and worsening  Heat exhaustion: new and requires workup  Diagnosis management comments: Doubt central cardiaca or CNS process at this time  R/o vol depletion, metabolic/electrolyte abnormality, infection  - EGK  - Labs  - UA  - IVF  - PRN analgesia, antiemetics  - Re-eval, dispo        Amount and/or Complexity of Data Reviewed  Clinical lab tests: reviewed and ordered  Tests in the medicine section of CPT®: ordered and reviewed  Decide to obtain previous medical records or to obtain history from someone other than the patient: yes  Obtain history from someone other than the patient: yes (spouse)  Review and summarize past medical records: yes  Independent visualization of images, tracings, or specimens: yes    Risk of Complications, Morbidity, and/or Mortality  Presenting problems: moderate  Diagnostic procedures: low  Management options: low    Patient Progress  Patient progress: improved    CritCare Time    Disposition  Final diagnoses:   Headache   Heat exhaustion     Time reflects when diagnosis was documented in both MDM as applicable and the Disposition within this note     Time User Action Codes Description Comment    7/3/2018  4:59 PM Radha Richard Add [R51] Headache     7/3/2018  4:59 PM Yunior Even, [address]  5XXA] Heat exhaustion       ED Disposition     ED Disposition Condition Comment    Discharge  [address] discharge to home/self care  Condition at discharge: Good        Follow-up Information     Follow up With Specialties Details Why Contact Helder Mojica MD [facility] Schedule an appointment as soon as possible for a visit  [address] [facility] [address]  [phone]            Discharge Medication List as of 7/3/2018  5:14 PM      CONTINUE these medications which have NOT CHANGED    Details   aspirin-acetaminophen-caffeine (EXCEDRIN EXTRA STRENGTH) 250-250-65 MG per tablet Take 1 tablet by mouth every 6 (six) hours as needed for headaches, Historical Med      bimatoprost (LUMIGAN) 0 01 % ophthalmic drops Administer 1 drop to both eyes daily at bedtime  , Historical Med      brimonidine-timolol (COMBIGAN) 0 2-0 5 % Administer 1 drop to both eyes 2 (two) times a day, Historical Med           No discharge procedures on file      ED Provider  Electronically Signed by           Sarah Schaeffer MD  07/07/18 5572

## 2018-07-19 ENCOUNTER — TELEPHONE (OUTPATIENT)
Dept: FAMILY MEDICINE CLINIC | Facility: CLINIC | Age: 73
End: 2018-07-19

## 2018-07-19 NOTE — TELEPHONE ENCOUNTER
Dr Erin Boucher,     Patient has an appt  With you on Monday but she wanted you to know a few things  She doesn't want him to know she called but he is putting her under a lot of stress and she does not want to have another nervous break down  He is getting a lot of bad headaches every day for a while and he went to Dr Leopoldo Pies and she gave him 5 different prescriptions with no help from side affects from any of them  Sometimes he gets nasty because of the headaches and he throws water and other objects and his temper gets out of control  When he doesn't have a headache, he is nice as can be  He can't accept the fact that he can't do what he used to do and she doesn't know what to do for him  She worked at the [employer] for 25 years and is thinking about calling them or the Reverend for help  And he can't let go of the past about being in the service  Kind of like a PTSD  She doesn't know if that is what is causing the headaches or not?

## 2018-07-23 ENCOUNTER — OFFICE VISIT (OUTPATIENT)
Dept: FAMILY MEDICINE CLINIC | Facility: CLINIC | Age: 73
End: 2018-07-23
Payer: COMMERCIAL

## 2018-07-23 VITALS
SYSTOLIC BLOOD PRESSURE: 128 MMHG | BODY MASS INDEX: 21.62 KG/M2 | DIASTOLIC BLOOD PRESSURE: 72 MMHG | TEMPERATURE: 96.8 F | HEART RATE: 62 BPM | HEIGHT: 70 IN | RESPIRATION RATE: 16 BRPM | WEIGHT: 151 LBS

## 2018-07-23 DIAGNOSIS — G89.29 CHRONIC NONINTRACTABLE HEADACHE, UNSPECIFIED HEADACHE TYPE: Primary | ICD-10-CM

## 2018-07-23 DIAGNOSIS — R41.0 CONFUSION: ICD-10-CM

## 2018-07-23 DIAGNOSIS — R35.0 FREQUENCY OF URINATION: ICD-10-CM

## 2018-07-23 DIAGNOSIS — R51.9 CHRONIC NONINTRACTABLE HEADACHE, UNSPECIFIED HEADACHE TYPE: Primary | ICD-10-CM

## 2018-07-23 DIAGNOSIS — G47.9 SLEEP DISTURBANCES: ICD-10-CM

## 2018-07-23 LAB
SL AMB  POCT GLUCOSE, UA: NORMAL
SL AMB LEUKOCYTE ESTERASE,UA: NORMAL
SL AMB POCT BILIRUBIN,UA: NORMAL
SL AMB POCT BLOOD,UA: NORMAL
SL AMB POCT CLARITY,UA: NORMAL
SL AMB POCT COLOR,UA: YELLOW
SL AMB POCT HEMOGLOBIN AIC: 5.2
SL AMB POCT KETONES,UA: NORMAL
SL AMB POCT NITRITE,UA: NORMAL
SL AMB POCT PH,UA: 6
SL AMB POCT SPECIFIC GRAVITY,UA: 1.01
SL AMB POCT URINE PROTEIN: NORMAL
SL AMB POCT UROBILINOGEN: NORMAL

## 2018-07-23 PROCEDURE — 99214 OFFICE O/P EST MOD 30 MIN: CPT | Performed by: FAMILY MEDICINE

## 2018-07-23 PROCEDURE — 3725F SCREEN DEPRESSION PERFORMED: CPT | Performed by: FAMILY MEDICINE

## 2018-07-23 PROCEDURE — 81003 URINALYSIS AUTO W/O SCOPE: CPT | Performed by: FAMILY MEDICINE

## 2018-07-23 PROCEDURE — 83036 HEMOGLOBIN GLYCOSYLATED A1C: CPT | Performed by: FAMILY MEDICINE

## 2018-07-23 RX ORDER — AMOXICILLIN 875 MG/1
875 TABLET, COATED ORAL 2 TIMES DAILY
Qty: 20 TABLET | Refills: 0 | Status: SHIPPED | OUTPATIENT
Start: 2018-07-23 | End: 2018-08-02

## 2018-07-23 RX ORDER — TRAZODONE HYDROCHLORIDE 50 MG/1
50 TABLET ORAL
Qty: 30 TABLET | Refills: 2 | Status: SHIPPED | OUTPATIENT
Start: 2018-07-23 | End: 2018-09-26 | Stop reason: SDUPTHER

## 2018-07-23 NOTE — PATIENT INSTRUCTIONS
General Headache   AMBULATORY CARE:   Headache pain  may be mild or severe  Common causes include stress, medicines, and head injuries  Sleep problems, allergies, and hormone changes can also cause a headache  You may have frequent headaches that have no clear cause  Pain may start in another part of your body and move to your head  Headache pain can also move to other parts of your body  A headache can cause other symptoms, such as nausea and vomiting  A severe headache may be a sign of a stroke or other serious problem that needs immediate treatment  Call 911 for any of the following:   · You have any of the following signs of a stroke:      ¨ Numbness or drooping on one side of your face     ¨ Weakness in an arm or leg    ¨ Confusion or difficulty speaking    ¨ Dizziness, a severe headache, or vision loss    Seek care immediately if:   · You have a headache with neck stiffness and a fever  · You have a constant headache and are vomiting  · You have severe pain that does not get better after you take pain medicine  · You have a headache and the pain worsens when you look into light  · You have a headache and vision changes, such as blurred vision  · You have a headache and are forgetful or confused  Contact your healthcare provider if:   · You have a headache each day that does not get better, even after treatment  · You have changes in your headaches, or new symptoms that occur when you have a headache  · Others you live or work with also have headaches  · You have questions or concerns about your condition or care  Treatment  may include any of the following:  · Medicines  may be given to prevent or treat headache pain  Do not wait until the pain is severe to take your medicine  Ask your healthcare provider how to take the medicine safely  · NSAIDs , such as ibuprofen, help decrease swelling, pain, and fever  This medicine is available with or without a doctor's order   NSAIDs can cause stomach bleeding or kidney problems in certain people  If you take blood thinner medicine, always ask if NSAIDs are safe for you  Always read the medicine label and follow directions  Do not give these medicines to children under 10months of age without direction from your child's healthcare provider  · Acetaminophen  decreases pain and fever  It is available without a doctor's order  Ask how much to take and how often to take it  Follow directions  Read the labels of all other medicines you are using to see if they also contain acetaminophen, or ask your doctor or pharmacist  Acetaminophen can cause liver damage if not taken correctly  Do not use more than 4 grams (4,000 milligrams) total of acetaminophen in one day  · Antinausea medicine  may be given to calm your stomach and help prevent vomiting  Manage your symptoms:   · Rest in a dark and quiet room  This may help decrease your pain  · Apply heat or ice as directed  Heat or ice may help decrease pain or muscle spasms  Apply heat or ice on the area for 20 minutes every 2 hours for as many days as directed  Your healthcare provider may recommend that you alternate heat and ice  · Relax your muscles to help relieve a headache  Lie down in a comfortable position and close your eyes  Relax your muscles slowly  Start at your toes and work your way up your body  A massage or warm bath may also help relax your muscles  Keep a headache record:  Record the dates and times that you get headaches, and what you were doing before the headache started  Also record what you ate and drank in the 24 hours before the headache started  This might help your healthcare provider find the cause of your headaches and make a treatment plan  The record can also help you avoid headache triggers or manage your symptoms  Get enough sleep:  You should get 8 to 10 hours of sleep each night  Create a sleep schedule  Go to bed and wake up at the same times each day  It may be helpful to do something relaxing before bed  Do not watch television right before bed  Do not smoke:  Nicotine and other chemicals in cigarettes and cigars can trigger a headache or make it worse  Ask your healthcare provider for information if you currently smoke and need help to quit  E-cigarettes or smokeless tobacco still contain nicotine  Talk to your healthcare provider before you use these products  Drink liquids as directed: You may need to drink more liquid to prevent dehydration  Dehydration can cause a headache  Ask your healthcare provider how much liquid to drink each day and which liquids are best for you  Limit caffeine and alcohol as directed: Your headaches may be triggered by caffeine or alcohol  You may also develop a headache if you drink caffeine regularly and suddenly stop  Eat a variety of healthy foods:  Do not skip meals  Too little food can trigger a headache  Include fruits, vegetables, whole-grain breads, low-fat dairy products, beans, lean meat, and fish  Do not have trigger foods, such as chocolate and red wine  Foods that contain gluten, nitrates, MSG, or artificial sweeteners may also trigger a headache  Follow up with your healthcare provider as directed:  Write down your questions so you remember to ask them during your visits  © 2017 [facility] Information is for End User's use only and may not be sold, redistributed or otherwise used for commercial purposes  All illustrations and images included in CareNotes® are the copyrighted property of A D A M , Inc  or Felipe Treviño  The above information is an  only  It is not intended as medical advice for individual conditions or treatments  Talk to your doctor, nurse or pharmacist before following any medical regimen to see if it is safe and effective for you

## 2018-07-27 NOTE — PROGRESS NOTES
Assessment/Plan:     Diagnoses and all orders for this visit:    Confusion  -     POCT urine dip auto non-scope    Frequency of urination  -     POCT hemoglobin A1c    Chronic nonintractable headache, unspecified headache type  -     amoxicillin (AMOXIL) 875 mg tablet; Take 1 tablet (875 mg total) by mouth 2 (two) times a day for 10 days  -     traZODone (DESYREL) 50 mg tablet; Take 1 tablet (50 mg total) by mouth daily at bedtime    Sleep disturbances  -     amoxicillin (AMOXIL) 875 mg tablet; Take 1 tablet (875 mg total) by mouth 2 (two) times a day for 10 days  -     traZODone (DESYREL) 50 mg tablet; Take 1 tablet (50 mg total) by mouth daily at bedtime            Subjective:      Patient ID: Avi Alexander is a 74 y o  male  Chief Complaint   Patient presents with    Follow-up     slw er visit heat exhaustion    Migraine     2-3 times a day, complains about constispation   [facility]       18-year-old patient in with wife for follow-up on multiple chronic conditions  Telephone message from wife prior to this visit was reviewed-concern about behavioral issues with patient  Wife has noticed increased agitation especially when his headaches occur  Also other interval history of visit to the ED note reviewed  Patient diagnosed with heat exhaustion  Has had no recent follow-up visit to [address] [place] [address]   Had been seeing her upon referral after a prior office visit to evaluate his short-term memory loss and behavioral changes  MRI showed some mild atrophy and chronic small vessel disease,otherwise no significant abnormality  Pt given multiple rxs from neurologist to tx h/as but pt states either didn't work for extended time or he had side effects that he didn't tolerate  Labs showed mild anemia, 2  Lyme IgG bands positive but overall test negative  Hemoglobin A1c today equal to 5 2%  UA negative    BP/heart rate within normal         The following portions of the patient's history were reviewed and updated as appropriate: allergies, current medications, past family history, past medical history, past social history, past surgical history and problem list      Review of Systems   Constitutional: Positive for fatigue and fever  HENT: Positive for congestion and postnasal drip  Respiratory: Negative  Cardiovascular: Negative  Gastrointestinal: Negative  Genitourinary: Negative  Musculoskeletal: Positive for arthralgias and myalgias  Negative for neck stiffness  Skin: Negative for rash  Neurological: Positive for headaches  Psychiatric/Behavioral: Positive for confusion, decreased concentration, dysphoric mood and sleep disturbance  Negative for hallucinations and suicidal ideas  The patient is nervous/anxious  Objective:    /72 (BP Location: Left arm, Patient Position: Sitting, Cuff Size: Standard)   Pulse 62   Temp (!) 96 8 °F (36 °C)   Resp 16   Ht 5' 10" (1 778 m)   Wt 68 5 kg (151 lb)   BMI 21 67 kg/m²        Physical Exam   Constitutional: He is oriented to person, place, and time  He appears distressed  HENT:   Mouth/Throat: No oropharyngeal exudate  Eyes: Conjunctivae are normal    Neck: Neck supple  Cardiovascular: Normal rate and regular rhythm  Pulmonary/Chest: Effort normal and breath sounds normal    Abdominal: Soft  Bowel sounds are normal  There is no tenderness  Musculoskeletal: He exhibits tenderness  Neurological: He is alert and oriented to person, place, and time  No cranial nerve deficit  Skin: Skin is warm and dry  No rash noted  Psychiatric:   anxious   Nursing note and vitals reviewed  Labs;  Labs in chart were reviewed        Liu Hylton MD

## 2018-08-01 ENCOUNTER — TELEPHONE (OUTPATIENT)
Dept: FAMILY MEDICINE CLINIC | Facility: CLINIC | Age: 73
End: 2018-08-01

## 2018-08-02 ENCOUNTER — TELEPHONE (OUTPATIENT)
Dept: FAMILY MEDICINE CLINIC | Facility: CLINIC | Age: 73
End: 2018-08-02

## 2018-08-02 NOTE — TELEPHONE ENCOUNTER
Dr Jackelin Omalley,     Patients wife wanted to let you know he was taking amoxacillin and there is one left and they have worked very well  She also wanted to know more about making an appointment with Dr Aracely Alvarado

## 2018-08-03 ENCOUNTER — TELEPHONE (OUTPATIENT)
Dept: FAMILY MEDICINE CLINIC | Facility: CLINIC | Age: 73
End: 2018-08-03

## 2018-08-03 DIAGNOSIS — G44.209 TENSION-TYPE HEADACHE, NOT INTRACTABLE, UNSPECIFIED CHRONICITY PATTERN: Primary | ICD-10-CM

## 2018-08-03 DIAGNOSIS — M19.90 ARTHRITIS: ICD-10-CM

## 2018-08-03 DIAGNOSIS — F41.9 ANXIETY: ICD-10-CM

## 2018-08-03 RX ORDER — BUSPIRONE HYDROCHLORIDE 5 MG/1
5 TABLET ORAL 2 TIMES DAILY
Qty: 60 TABLET | Refills: 1 | Status: SHIPPED | OUTPATIENT
Start: 2018-08-03 | End: 2018-09-26 | Stop reason: ALTCHOICE

## 2018-08-03 RX ORDER — CELECOXIB 200 MG/1
200 CAPSULE ORAL DAILY
Qty: 30 CAPSULE | Refills: 0 | Status: SHIPPED | OUTPATIENT
Start: 2018-08-03 | End: 2018-09-07 | Stop reason: SDUPTHER

## 2018-08-03 NOTE — TELEPHONE ENCOUNTER
Dr Dayday Elizabeth works in neurology group w Dr Romi Badillo in [facility] are good-  Did she make appt yet?

## 2018-08-03 NOTE — TELEPHONE ENCOUNTER
Spoke to wife , she asked if you can prescribe something to calm shannan down? He has an appointment Aug 7 with dr Elaine Payne   The nurse was in and is going to look into infectious disease for lyme  tc/cma

## 2018-08-10 ENCOUNTER — OFFICE VISIT (OUTPATIENT)
Dept: FAMILY MEDICINE CLINIC | Facility: CLINIC | Age: 73
End: 2018-08-10
Payer: COMMERCIAL

## 2018-08-10 VITALS
RESPIRATION RATE: 14 BRPM | DIASTOLIC BLOOD PRESSURE: 60 MMHG | HEART RATE: 72 BPM | BODY MASS INDEX: 21.67 KG/M2 | TEMPERATURE: 98.2 F | WEIGHT: 151 LBS | SYSTOLIC BLOOD PRESSURE: 102 MMHG

## 2018-08-10 DIAGNOSIS — G89.29 CHRONIC NONINTRACTABLE HEADACHE, UNSPECIFIED HEADACHE TYPE: ICD-10-CM

## 2018-08-10 DIAGNOSIS — B02.9 HERPES ZOSTER WITHOUT COMPLICATION: Primary | ICD-10-CM

## 2018-08-10 DIAGNOSIS — R51.9 CHRONIC NONINTRACTABLE HEADACHE, UNSPECIFIED HEADACHE TYPE: ICD-10-CM

## 2018-08-10 PROCEDURE — 99213 OFFICE O/P EST LOW 20 MIN: CPT | Performed by: FAMILY MEDICINE

## 2018-08-10 RX ORDER — VALACYCLOVIR HYDROCHLORIDE 500 MG/1
500 TABLET, FILM COATED ORAL 3 TIMES DAILY
Qty: 21 TABLET | Refills: 0 | Status: SHIPPED | OUTPATIENT
Start: 2018-08-10 | End: 2018-09-26 | Stop reason: ALTCHOICE

## 2018-08-10 RX ORDER — OXCARBAZEPINE 300 MG/1
300 TABLET, FILM COATED ORAL EVERY 12 HOURS SCHEDULED
Refills: 0 | COMMUNITY
Start: 2018-08-07 | End: 2018-08-17 | Stop reason: HOSPADM

## 2018-08-10 RX ORDER — TAMSULOSIN HYDROCHLORIDE 0.4 MG/1
0.4 CAPSULE ORAL
Refills: 10 | COMMUNITY
Start: 2018-07-25 | End: 2018-08-17 | Stop reason: HOSPADM

## 2018-08-10 RX ORDER — LIDOCAINE 50 MG/G
1 PATCH TOPICAL DAILY
Qty: 30 PATCH | Refills: 0 | Status: SHIPPED | OUTPATIENT
Start: 2018-08-10 | End: 2018-08-17 | Stop reason: HOSPADM

## 2018-08-10 NOTE — LETTER
August 10, 2018    Re: [address] [id number]   : 1945      To Whom It May Concern: This letter is written on behalf of Mr Marixa Rodrigues in support of need for the [address] and [employer]  Mr  Marcy Ortiz suffers from multiple medical conditions including but not limited to chronic headaches, heart valvular condition, prostate cancer and recent episode of heat exhaustion  It is of my medical opinion that patient qualifies for the cooling assistance program to avoid any detrimental effects improper temperature regulation or supply may have on his current medical conditions       If you have any questions or concerns, please don't hesitate to call          Sincerely,         Jonatan Chung MD

## 2018-08-12 ENCOUNTER — APPOINTMENT (EMERGENCY)
Dept: RADIOLOGY | Facility: HOSPITAL | Age: 73
DRG: 644 | End: 2018-08-12
Payer: COMMERCIAL

## 2018-08-12 ENCOUNTER — HOSPITAL ENCOUNTER (INPATIENT)
Facility: HOSPITAL | Age: 73
LOS: 5 days | Discharge: HOME HEALTH | DRG: 644 | End: 2018-08-17
Attending: EMERGENCY MEDICINE | Admitting: STUDENT IN AN ORGANIZED HEALTH CARE EDUCATION/TRAINING PROGRAM
Payer: COMMERCIAL
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
## 2018-08-12 DIAGNOSIS — I95.1 ORTHOSTASIS: ICD-10-CM

## 2018-08-12 DIAGNOSIS — R29.6 FREQUENT FALLS: ICD-10-CM

## 2018-08-12 DIAGNOSIS — E87.1 HYPONATREMIA: ICD-10-CM

## 2018-08-12 PROBLEM — F32.A DEPRESSION: Status: ACTIVE | Noted: 2018-08-12

## 2018-08-12 PROBLEM — R07.9 CHEST PAIN: Status: ACTIVE | Noted: 2018-08-12

## 2018-08-12 PROBLEM — R51.9 CHRONIC HEADACHE: Status: ACTIVE | Noted: 2018-08-12

## 2018-08-12 PROBLEM — G89.29 CHRONIC HEADACHE: Status: ACTIVE | Noted: 2018-08-12

## 2018-08-12 PROBLEM — B02.9 SHINGLES: Status: ACTIVE | Noted: 2018-08-12

## 2018-08-12 LAB
ALBUMIN SERPL BCP-MCNC: 3.1 G/DL (ref 3.5–5)
ALP SERPL-CCNC: 67 U/L (ref 46–116)
ALT SERPL W P-5'-P-CCNC: 19 U/L (ref 12–78)
ANION GAP SERPL CALCULATED.3IONS-SCNC: 3 MMOL/L (ref 4–13)
APTT PPP: 31 SECONDS (ref 24–36)
AST SERPL W P-5'-P-CCNC: 18 U/L (ref 5–45)
BASOPHILS # BLD AUTO: 0.05 THOUSANDS/ΜL (ref 0–0.1)
BASOPHILS NFR BLD AUTO: 1 % (ref 0–1)
BILIRUB SERPL-MCNC: 0.2 MG/DL (ref 0.2–1)
BILIRUB UR QL STRIP: NEGATIVE
BUN SERPL-MCNC: 28 MG/DL (ref 5–25)
CALCIUM SERPL-MCNC: 8.5 MG/DL (ref 8.3–10.1)
CHLORIDE SERPL-SCNC: 93 MMOL/L (ref 100–108)
CLARITY UR: CLEAR
CO2 SERPL-SCNC: 28 MMOL/L (ref 21–32)
COLOR UR: YELLOW
CREAT SERPL-MCNC: 0.96 MG/DL (ref 0.6–1.3)
EOSINOPHIL # BLD AUTO: 0.18 THOUSAND/ΜL (ref 0–0.61)
EOSINOPHIL NFR BLD AUTO: 3 % (ref 0–6)
ERYTHROCYTE [DISTWIDTH] IN BLOOD BY AUTOMATED COUNT: 14.4 % (ref 11.6–15.1)
GFR SERPL CREATININE-BSD FRML MDRD: 77 ML/MIN/1.73SQ M
GLUCOSE SERPL-MCNC: 126 MG/DL (ref 65–140)
GLUCOSE UR STRIP-MCNC: NEGATIVE MG/DL
HCT VFR BLD AUTO: 31.6 % (ref 36.5–49.3)
HGB BLD-MCNC: 10.2 G/DL (ref 12–17)
HGB UR QL STRIP.AUTO: NEGATIVE
IMM GRANULOCYTES # BLD AUTO: 0.02 THOUSAND/UL (ref 0–0.2)
IMM GRANULOCYTES NFR BLD AUTO: 0 % (ref 0–2)
INR PPP: 0.98 (ref 0.86–1.16)
KETONES UR STRIP-MCNC: NEGATIVE MG/DL
LEUKOCYTE ESTERASE UR QL STRIP: NEGATIVE
LYMPHOCYTES # BLD AUTO: 1.17 THOUSANDS/ΜL (ref 0.6–4.47)
LYMPHOCYTES NFR BLD AUTO: 19 % (ref 14–44)
MCH RBC QN AUTO: 26.5 PG (ref 26.8–34.3)
MCHC RBC AUTO-ENTMCNC: 32.3 G/DL (ref 31.4–37.4)
MCV RBC AUTO: 82 FL (ref 82–98)
MONOCYTES # BLD AUTO: 0.5 THOUSAND/ΜL (ref 0.17–1.22)
MONOCYTES NFR BLD AUTO: 8 % (ref 4–12)
NEUTROPHILS # BLD AUTO: 4.39 THOUSANDS/ΜL (ref 1.85–7.62)
NEUTS SEG NFR BLD AUTO: 69 % (ref 43–75)
NITRITE UR QL STRIP: NEGATIVE
NRBC BLD AUTO-RTO: 0 /100 WBCS
PH UR STRIP.AUTO: 6 [PH] (ref 5–9)
PLATELET # BLD AUTO: 172 THOUSANDS/UL (ref 149–390)
PMV BLD AUTO: 10.4 FL (ref 8.9–12.7)
POTASSIUM SERPL-SCNC: 3.8 MMOL/L (ref 3.5–5.3)
PROT SERPL-MCNC: 5.8 G/DL (ref 6.4–8.2)
PROT UR STRIP-MCNC: NEGATIVE MG/DL
PROTHROMBIN TIME: 10.3 SECONDS (ref 9.4–11.7)
RBC # BLD AUTO: 3.85 MILLION/UL (ref 3.88–5.62)
SODIUM SERPL-SCNC: 124 MMOL/L (ref 136–145)
SP GR UR STRIP.AUTO: 1.01 (ref 1–1.03)
TROPONIN I SERPL-MCNC: <0.02 NG/ML
TSH SERPL DL<=0.05 MIU/L-ACNC: 1.32 UIU/ML (ref 0.36–3.74)
UROBILINOGEN UR QL STRIP.AUTO: 0.2 E.U./DL
WBC # BLD AUTO: 6.31 THOUSAND/UL (ref 4.31–10.16)

## 2018-08-12 PROCEDURE — 83935 ASSAY OF URINE OSMOLALITY: CPT | Performed by: STUDENT IN AN ORGANIZED HEALTH CARE EDUCATION/TRAINING PROGRAM

## 2018-08-12 PROCEDURE — 85730 THROMBOPLASTIN TIME PARTIAL: CPT | Performed by: EMERGENCY MEDICINE

## 2018-08-12 PROCEDURE — 81003 URINALYSIS AUTO W/O SCOPE: CPT | Performed by: EMERGENCY MEDICINE

## 2018-08-12 PROCEDURE — 85025 COMPLETE CBC W/AUTO DIFF WBC: CPT | Performed by: EMERGENCY MEDICINE

## 2018-08-12 PROCEDURE — 87081 CULTURE SCREEN ONLY: CPT | Performed by: STUDENT IN AN ORGANIZED HEALTH CARE EDUCATION/TRAINING PROGRAM

## 2018-08-12 PROCEDURE — 84443 ASSAY THYROID STIM HORMONE: CPT | Performed by: STUDENT IN AN ORGANIZED HEALTH CARE EDUCATION/TRAINING PROGRAM

## 2018-08-12 PROCEDURE — 85610 PROTHROMBIN TIME: CPT | Performed by: EMERGENCY MEDICINE

## 2018-08-12 PROCEDURE — 70450 CT HEAD/BRAIN W/O DYE: CPT

## 2018-08-12 PROCEDURE — 84300 ASSAY OF URINE SODIUM: CPT | Performed by: STUDENT IN AN ORGANIZED HEALTH CARE EDUCATION/TRAINING PROGRAM

## 2018-08-12 PROCEDURE — 99285 EMERGENCY DEPT VISIT HI MDM: CPT

## 2018-08-12 PROCEDURE — 84484 ASSAY OF TROPONIN QUANT: CPT | Performed by: EMERGENCY MEDICINE

## 2018-08-12 PROCEDURE — 84300 ASSAY OF URINE SODIUM: CPT | Performed by: EMERGENCY MEDICINE

## 2018-08-12 PROCEDURE — 80053 COMPREHEN METABOLIC PANEL: CPT | Performed by: EMERGENCY MEDICINE

## 2018-08-12 PROCEDURE — 71045 X-RAY EXAM CHEST 1 VIEW: CPT

## 2018-08-12 PROCEDURE — 99222 1ST HOSP IP/OBS MODERATE 55: CPT | Performed by: STUDENT IN AN ORGANIZED HEALTH CARE EDUCATION/TRAINING PROGRAM

## 2018-08-12 PROCEDURE — 36415 COLL VENOUS BLD VENIPUNCTURE: CPT | Performed by: EMERGENCY MEDICINE

## 2018-08-12 PROCEDURE — 93005 ELECTROCARDIOGRAM TRACING: CPT

## 2018-08-12 RX ORDER — TAMSULOSIN HYDROCHLORIDE 0.4 MG/1
0.4 CAPSULE ORAL
Status: DISCONTINUED | OUTPATIENT
Start: 2018-08-13 | End: 2018-08-16

## 2018-08-12 RX ORDER — TRAZODONE HYDROCHLORIDE 50 MG/1
50 TABLET ORAL
Status: DISCONTINUED | OUTPATIENT
Start: 2018-08-12 | End: 2018-08-17 | Stop reason: HOSPADM

## 2018-08-12 RX ORDER — SODIUM CHLORIDE 9 MG/ML
50 INJECTION, SOLUTION INTRAVENOUS CONTINUOUS
Status: DISCONTINUED | OUTPATIENT
Start: 2018-08-12 | End: 2018-08-14

## 2018-08-12 RX ORDER — OXCARBAZEPINE 150 MG/1
300 TABLET, FILM COATED ORAL EVERY 12 HOURS SCHEDULED
Status: DISCONTINUED | OUTPATIENT
Start: 2018-08-12 | End: 2018-08-15

## 2018-08-12 RX ORDER — HEPARIN SODIUM 5000 [USP'U]/ML
5000 INJECTION, SOLUTION INTRAVENOUS; SUBCUTANEOUS EVERY 8 HOURS SCHEDULED
Status: DISCONTINUED | OUTPATIENT
Start: 2018-08-12 | End: 2018-08-17 | Stop reason: HOSPADM

## 2018-08-12 RX ORDER — VALACYCLOVIR HYDROCHLORIDE 500 MG/1
500 TABLET, FILM COATED ORAL 3 TIMES DAILY
Status: DISCONTINUED | OUTPATIENT
Start: 2018-08-12 | End: 2018-08-17 | Stop reason: HOSPADM

## 2018-08-12 RX ORDER — BUSPIRONE HYDROCHLORIDE 5 MG/1
5 TABLET ORAL 2 TIMES DAILY
Status: DISCONTINUED | OUTPATIENT
Start: 2018-08-13 | End: 2018-08-17 | Stop reason: HOSPADM

## 2018-08-12 RX ADMIN — VALACYCLOVIR 500 MG: 500 TABLET, FILM COATED ORAL at 23:38

## 2018-08-12 RX ADMIN — SODIUM CHLORIDE 50 ML/HR: 0.9 INJECTION, SOLUTION INTRAVENOUS at 23:38

## 2018-08-12 RX ADMIN — TRAZODONE HYDROCHLORIDE 50 MG: 50 TABLET ORAL at 23:38

## 2018-08-12 RX ADMIN — HEPARIN SODIUM 5000 UNITS: 5000 INJECTION, SOLUTION INTRAVENOUS; SUBCUTANEOUS at 23:38

## 2018-08-13 ENCOUNTER — TELEPHONE (OUTPATIENT)
Dept: FAMILY MEDICINE CLINIC | Facility: CLINIC | Age: 73
End: 2018-08-13

## 2018-08-13 LAB
ANION GAP SERPL CALCULATED.3IONS-SCNC: 9 MMOL/L (ref 4–13)
ATRIAL RATE: 53 BPM
BASOPHILS # BLD AUTO: 0.04 THOUSANDS/ΜL (ref 0–0.1)
BASOPHILS NFR BLD AUTO: 1 % (ref 0–1)
BUN SERPL-MCNC: 21 MG/DL (ref 5–25)
CALCIUM SERPL-MCNC: 8.6 MG/DL (ref 8.3–10.1)
CHLORIDE SERPL-SCNC: 94 MMOL/L (ref 100–108)
CO2 SERPL-SCNC: 25 MMOL/L (ref 21–32)
CREAT SERPL-MCNC: 0.74 MG/DL (ref 0.6–1.3)
EOSINOPHIL # BLD AUTO: 0.14 THOUSAND/ΜL (ref 0–0.61)
EOSINOPHIL NFR BLD AUTO: 3 % (ref 0–6)
ERYTHROCYTE [DISTWIDTH] IN BLOOD BY AUTOMATED COUNT: 14.3 % (ref 11.6–15.1)
GFR SERPL CREATININE-BSD FRML MDRD: 90 ML/MIN/1.73SQ M
GLUCOSE SERPL-MCNC: 73 MG/DL (ref 65–140)
HCT VFR BLD AUTO: 36.7 % (ref 36.5–49.3)
HGB BLD-MCNC: 11.8 G/DL (ref 12–17)
IMM GRANULOCYTES # BLD AUTO: 0.01 THOUSAND/UL (ref 0–0.2)
IMM GRANULOCYTES NFR BLD AUTO: 0 % (ref 0–2)
LYMPHOCYTES # BLD AUTO: 1.01 THOUSANDS/ΜL (ref 0.6–4.47)
LYMPHOCYTES NFR BLD AUTO: 23 % (ref 14–44)
MCH RBC QN AUTO: 26.5 PG (ref 26.8–34.3)
MCHC RBC AUTO-ENTMCNC: 32.2 G/DL (ref 31.4–37.4)
MCV RBC AUTO: 82 FL (ref 82–98)
MONOCYTES # BLD AUTO: 0.39 THOUSAND/ΜL (ref 0.17–1.22)
MONOCYTES NFR BLD AUTO: 9 % (ref 4–12)
NEUTROPHILS # BLD AUTO: 2.89 THOUSANDS/ΜL (ref 1.85–7.62)
NEUTS SEG NFR BLD AUTO: 64 % (ref 43–75)
NRBC BLD AUTO-RTO: 0 /100 WBCS
OSMOLALITY UR/SERPL-RTO: 264 MMOL/KG (ref 282–298)
OSMOLALITY UR: 462 MMOL/KG
P AXIS: 46 DEGREES
PLATELET # BLD AUTO: 194 THOUSANDS/UL (ref 149–390)
PMV BLD AUTO: 10.7 FL (ref 8.9–12.7)
POTASSIUM SERPL-SCNC: 3.9 MMOL/L (ref 3.5–5.3)
PR INTERVAL: 214 MS
QRS AXIS: 26 DEGREES
QRSD INTERVAL: 84 MS
QT INTERVAL: 410 MS
QTC INTERVAL: 384 MS
RBC # BLD AUTO: 4.46 MILLION/UL (ref 3.88–5.62)
SODIUM 24H UR-SCNC: 72 MOL/L
SODIUM 24H UR-SCNC: 72 MOL/L
SODIUM SERPL-SCNC: 127 MMOL/L (ref 136–145)
SODIUM SERPL-SCNC: 128 MMOL/L (ref 136–145)
T WAVE AXIS: 36 DEGREES
TROPONIN I SERPL-MCNC: <0.02 NG/ML
TROPONIN I SERPL-MCNC: <0.02 NG/ML
VENTRICULAR RATE: 53 BPM
WBC # BLD AUTO: 4.48 THOUSAND/UL (ref 4.31–10.16)

## 2018-08-13 PROCEDURE — 80048 BASIC METABOLIC PNL TOTAL CA: CPT | Performed by: STUDENT IN AN ORGANIZED HEALTH CARE EDUCATION/TRAINING PROGRAM

## 2018-08-13 PROCEDURE — 83930 ASSAY OF BLOOD OSMOLALITY: CPT | Performed by: STUDENT IN AN ORGANIZED HEALTH CARE EDUCATION/TRAINING PROGRAM

## 2018-08-13 PROCEDURE — 99222 1ST HOSP IP/OBS MODERATE 55: CPT | Performed by: INTERNAL MEDICINE

## 2018-08-13 PROCEDURE — 97166 OT EVAL MOD COMPLEX 45 MIN: CPT

## 2018-08-13 PROCEDURE — G8987 SELF CARE CURRENT STATUS: HCPCS

## 2018-08-13 PROCEDURE — 97162 PT EVAL MOD COMPLEX 30 MIN: CPT

## 2018-08-13 PROCEDURE — G8978 MOBILITY CURRENT STATUS: HCPCS

## 2018-08-13 PROCEDURE — 93010 ELECTROCARDIOGRAM REPORT: CPT | Performed by: INTERNAL MEDICINE

## 2018-08-13 PROCEDURE — 85025 COMPLETE CBC W/AUTO DIFF WBC: CPT | Performed by: STUDENT IN AN ORGANIZED HEALTH CARE EDUCATION/TRAINING PROGRAM

## 2018-08-13 PROCEDURE — G8988 SELF CARE GOAL STATUS: HCPCS

## 2018-08-13 PROCEDURE — 97110 THERAPEUTIC EXERCISES: CPT

## 2018-08-13 PROCEDURE — 84484 ASSAY OF TROPONIN QUANT: CPT | Performed by: STUDENT IN AN ORGANIZED HEALTH CARE EDUCATION/TRAINING PROGRAM

## 2018-08-13 PROCEDURE — 84295 ASSAY OF SERUM SODIUM: CPT | Performed by: STUDENT IN AN ORGANIZED HEALTH CARE EDUCATION/TRAINING PROGRAM

## 2018-08-13 PROCEDURE — G8979 MOBILITY GOAL STATUS: HCPCS

## 2018-08-13 RX ORDER — ACETAMINOPHEN 325 MG/1
650 TABLET ORAL EVERY 6 HOURS PRN
Status: DISCONTINUED | OUTPATIENT
Start: 2018-08-13 | End: 2018-08-17 | Stop reason: HOSPADM

## 2018-08-13 RX ORDER — ONDANSETRON 2 MG/ML
4 INJECTION INTRAMUSCULAR; INTRAVENOUS EVERY 6 HOURS PRN
Status: DISCONTINUED | OUTPATIENT
Start: 2018-08-13 | End: 2018-08-17 | Stop reason: HOSPADM

## 2018-08-13 RX ADMIN — VALACYCLOVIR 500 MG: 500 TABLET, FILM COATED ORAL at 21:22

## 2018-08-13 RX ADMIN — ONDANSETRON 4 MG: 2 INJECTION INTRAMUSCULAR; INTRAVENOUS at 22:01

## 2018-08-13 RX ADMIN — ACETAMINOPHEN 650 MG: 325 TABLET, FILM COATED ORAL at 21:38

## 2018-08-13 RX ADMIN — VALACYCLOVIR 500 MG: 500 TABLET, FILM COATED ORAL at 10:38

## 2018-08-13 RX ADMIN — BUSPIRONE HYDROCHLORIDE 5 MG: 5 TABLET ORAL at 17:21

## 2018-08-13 RX ADMIN — OXCARBAZEPINE 300 MG: 150 TABLET ORAL at 10:38

## 2018-08-13 RX ADMIN — HEPARIN SODIUM 5000 UNITS: 5000 INJECTION, SOLUTION INTRAVENOUS; SUBCUTANEOUS at 15:19

## 2018-08-13 RX ADMIN — ACETAMINOPHEN 650 MG: 325 TABLET, FILM COATED ORAL at 15:33

## 2018-08-13 RX ADMIN — TAMSULOSIN HYDROCHLORIDE 0.4 MG: 0.4 CAPSULE ORAL at 15:33

## 2018-08-13 RX ADMIN — BUSPIRONE HYDROCHLORIDE 5 MG: 5 TABLET ORAL at 10:38

## 2018-08-13 RX ADMIN — VALACYCLOVIR 500 MG: 500 TABLET, FILM COATED ORAL at 15:19

## 2018-08-13 RX ADMIN — HEPARIN SODIUM 5000 UNITS: 5000 INJECTION, SOLUTION INTRAVENOUS; SUBCUTANEOUS at 05:34

## 2018-08-13 RX ADMIN — TRAZODONE HYDROCHLORIDE 50 MG: 50 TABLET ORAL at 21:23

## 2018-08-13 RX ADMIN — HEPARIN SODIUM 5000 UNITS: 5000 INJECTION, SOLUTION INTRAVENOUS; SUBCUTANEOUS at 21:23

## 2018-08-13 RX ADMIN — OXCARBAZEPINE 300 MG: 150 TABLET ORAL at 21:22

## 2018-08-13 RX ADMIN — OXCARBAZEPINE 300 MG: 150 TABLET ORAL at 00:07

## 2018-08-13 NOTE — PHYSICAL THERAPY NOTE
PT EVALUATION     08/13/18 0905   Note Type   Note type Eval/Treat   Pain Assessment   Pain Assessment 0-10   Pain Score 3   Pain Type Acute pain  (only with mobility)   Pain Location Chest;Knee   Pain Orientation Left   Home Living   Type of Home House   Home Layout Two level;Bed/bath upstairs;1/2 bath on main level  (3-4 TAYA with rail)   Home Equipment Cane  (RW)   Prior Function   Level of [place] Independent with ADLs and functional mobility  (amb w/out AD PTA;occasional use of cane or RW)   Lives With [place] Help From Family   ADL Assistance Independent   IADLs Independent   Falls in the last 6 months 1 to 4   Comments Per pt's wife via phone call, pt was supposed to have home PT today and visiting nurse tomorrow  Restrictions/Precautions   Other Precautions Fall Risk;Pain; Chair Alarm   General   Additional Pertinent History Pt adm s/p fall with left chest pain  Family/Caregiver Present No   Cognition   Overall Cognitive Status WFL   Arousal/Participation Cooperative   Orientation Level Oriented X4   Following Commands Follows all commands and directions without difficulty   RLE Assessment   RLE Assessment WFL  (hip 3+ to 4-/5, knee 4-/5, ankle 4/5)   LLE Assessment   LLE Assessment WFL  (hip 3 to 3+/5, knee 3+ to 4-/5, ankle  4/5)   Light Touch   RLE Light Touch Grossly intact   LLE Light Touch Grossly intact   Bed Mobility   Additional Comments Pt able to don socks/slippers while sitting EOB - I; pt's slippers noted to be too large for pt  Transfers   Sit to Stand 4  Minimal assistance   Additional items Assist x 1;Verbal cues   Stand to Sit 5  Supervision   Additional items Assist x 1;Verbal cues   Ambulation/Elevation   Gait pattern Short stride; Shuffling;Decreased foot clearance; Improper Weight shift; Forward Flexion;Narrow JACKIE   Gait Assistance 4  Minimal assist   Additional items Assist x 1;Verbal cues; Tactile cues   Assistive Device None   Distance 60 feet   Balance   Static Sitting Good   Static Standing Fair +   Dynamic Standing Fair   Ambulatory Fair   Activity Tolerance   Activity Tolerance Patient limited by pain  (left knee and chest)   Assessment   Prognosis Good   Problem List Decreased strength;Decreased range of motion;Decreased endurance; Impaired balance;Decreased mobility; Decreased coordination;Decreased safety awareness;Pain   Assessment Patient seen for Physical Therapy evaluation  Patient admitted with Frequent falls  Comorbidities affecting patient's physical performance include: Frequent falls, HTN, moderate aortic stenosis, peripheral arteriosclerosis, pain both feet, shingles  Personal factors affecting patient at time of initial evaluation include: lives in two story house, ambulating with assistive device, stairs to enter home, inability to navigate community distances, inability to navigate level surfaces without external assistance, inability to perform dynamic tasks in community, limited home support, positive fall history and depression  Prior to admission, patient was independent with functional mobility with cane or RW, independent with functional mobility without assistive device, independent with ADLS, independent with IADLS, living with spouse in a two level home with 3-4 steps to enter, ambulating household distance and ambulating community distances  Please find objective findings from Physical Therapy assessment regarding body systems outlined above with impairments and limitations including weakness, decreased ROM, impaired balance, decreased endurance, impaired coordination, gait deviations, pain, decreased activity tolerance, decreased functional mobility tolerance, decreased safety awareness and fall risk  The Barthel Index was used as a functional outcome tool presenting with a score of 60 today indicating moderate limitations of functional mobility and ADLS    Patient's clinical presentation is currently evolving as seen in patient's presentation of vital sign response, changing level of pain, increased fall risk, new onset of impairment of functional mobility, decreased endurance and new onset of weakness  Pt would benefit from continued Physical Therapy treatment to address deficits as defined above and maximize level of functional mobility  As demonstrated by objective findings, the assigned level of complexity for this evaluation is moderate  Goals   Patient Goals home today   STG Expiration Date 08/20/18   Short Term Goal #1 bed mob - S; trans - S   Short Term Goal #2 amb w/wout AD x 100 feet so pt can navigate distances in home with decreased gait deviations and increased safety; up/down 4 steps - S so pt can safely enter/exit his home; balance w/wout AD - F/F+ for safe mobility/ADLs   LTG Expiration Date 08/27/18   Long Term Goal #1 Independent all functional mobility, w/wout AD as previous, distances of 500 feet or greater so pt can navigate both home/community distances   Long Term Goal #2 up/down full flight of steps with rail - I so pt can access all areas of his home; balance w/wout AD - F+/G for safe mobility/ADLs   Treatment Day 1   Plan   Treatment/Interventions ADL retraining;Functional transfer training;LE strengthening/ROM; Elevations; Therapeutic exercise; Endurance training;Patient/family training;Equipment eval/education; Bed mobility;Gait training   PT Frequency 5x/wk   Recommendation   Recommendation Home with family support;Home PT   Equipment Recommended (Pt has a cane and RW)   Barthel Index   Feeding 10   Bathing 0   Grooming Score 5   Dressing Score 5   Bladder Score 10   Bowels Score 10   Toilet Use Score 5   Transfers (Bed/Chair) Score 10   Mobility (Level Surface) Score 0   Stairs Score 5   Barthel Index Score 61   Licensure   [place] License Number  Mona Ash [place], [place] [id number]     Time [id number]  Time PDA:[id number]  Total Time: 10 mins      S:  "It's hard to get up from the low chair"  O:  Pt trans sit to stand with min A  Pt amb with RW x 60 feet  Pt returned to b/s chair and sat OOB in chair with all needs in reach, (+) chair alarm  A:  Gait deviations persist as noted in eval, but are decreased with use of the the RW  Pt is noted with increased safety as well with use of the RW  Pt will benefit from cont amb with RW at this time vs without AD and pt agrees  P:  Cont per PT POC  DCP - home with family support;home PT;cont use of RW for amb      Eddie [place]   [id number] A 46 year old female pt presents to the ED c/o dizziness. Will check labs, CT head, and re-evaluate.

## 2018-08-13 NOTE — PLAN OF CARE
DISCHARGE PLANNING     Discharge to home or other facility with appropriate resources Progressing        METABOLIC, FLUID AND ELECTROLYTES - ADULT     Electrolytes maintained within normal limits Progressing        MUSCULOSKELETAL - ADULT     Maintain or return mobility to safest level of function Progressing        PAIN - ADULT     Verbalizes/displays adequate comfort level or baseline comfort level Progressing        Potential for Falls     Patient will remain free of falls Progressing        SAFETY ADULT     Maintain or return to baseline ADL function Progressing     Patient will remain free of falls Progressing

## 2018-08-13 NOTE — TELEPHONE ENCOUNTER
Pt fell Friday then again on Sunday and bumped his head    Wife said his patch for the shingles was not covered is there anything else that he could use?tc/cma

## 2018-08-13 NOTE — ASSESSMENT & PLAN NOTE
Falls seem to be more mechanical in nature  Patient denies any syncopal symptoms  CT head is negative for an acute process    Will place on fall precautions, check a Vitamin B12 level and have PT/OT evaluate the pt for discharge needs

## 2018-08-13 NOTE — PROGRESS NOTES
Progress Note - Army Dickerson 1945, 74 y o  male MRN: [id number]    Unit/Bed#: [address] [place] [address] Encounter: [id number]    Primary Care Provider: Rajwinder Horowitz MD   Date and time admitted to hospital: 2018  8:11 PM        * Frequent falls   Assessment & Plan    Falls seem to be more mechanical in nature  Patient denies any syncopal symptoms  CT head is negative for an acute process  Will place on fall precautions, check a Vitamin B12 level and have PT/OT evaluate the pt for discharge needs        Hyponatremia   Assessment & Plan    Nephrology input noted  Sodium up to 128 today  Possible etiologies include volume depletion versus SIADH related to trileptal   On low-dose IV normal saline as well as fluid restriction  Osmalarity testing pending        Shingles   Assessment & Plan    Continue Valtrex        Depression   Assessment & Plan    Continue Buspar        Chronic headache   Assessment & Plan    family report he was recently started on Trileptal   See above the continue for now  Chest pain   Assessment & Plan    likely secondary to trauma  CXR with no acute rib fracture appreciated  EKG reveals sinus bradycardia  troponin negative  Ok to stop tele            VTE Pharmacologic Prophylaxis:   Pharmacologic: Heparin  Mechanical VTE Prophylaxis in Place: No    Patient Centered Rounds: I have performed bedside rounds with nursing staff today  Current Length of Stay: 1 day(s)    Current Patient Status: Inpatient   Certification Statement: The patient will continue to require additional inpatient hospital stay due to Hyponatremia    Code Status: Level 1 - Full Code      Subjective:   Patient feels better today  Does complain of some left hip pain but otherwise without complaints      Objective:     Vitals:   Temp (24hrs), Av 6 °F (36 4 °C), Min:97 5 °F (36 4 °C), Max:97 6 °F (36 4 °C)    HR:  [46-56] 56  Resp:  [13-20] 20  BP: (159-184)/(67-86) 184/86  SpO2:  [98 %-100 %] 98 %  Body mass index is 21 31 kg/m²  Input and Output Summary (last 24 hours): Intake/Output Summary (Last 24 hours) at 08/13/18 1127  Last data filed at 08/13/18 0723   Gross per 24 hour   Intake           439 17 ml   Output             1400 ml   Net          -960 83 ml       Physical Exam:  General Appearance:    Alert, cooperative, no distress, appropriately responsive    Head:    Normocephalic, without obvious abnormality, atraumatic, mucous membranes moist    Eyes:    Conjunctiva/corneas clear, EOM's intact   Neck:   Supple, no JVD or bruits noted   Lungs:     Clear to auscultation bilaterally, respirations unlabored, no crackles or wheeze     Heart:    Regular rate and rhythm, S1 and S2    Abdomen:     Soft, non-tender, bowel sounds active all four quadrants,     no masses, no organomegaly   Extremities:   Extremities normal, atraumatic, no cyanosis or edema   Neurologic:  nonfocal, A/O x 3           Additional Data:     Labs:      Results from last 7 days  Lab Units 08/13/18  0510   WBC Thousand/uL 4 48   HEMOGLOBIN g/dL 11 8*   HEMATOCRIT % 36 7   PLATELETS Thousands/uL 194   NEUTROS PCT % 64   LYMPHS PCT % 23   MONOS PCT % 9   EOS PCT % 3       Results from last 7 days  Lab Units 08/13/18  0510  08/12/18 2026   SODIUM mmol/L 128*  < > 124*   POTASSIUM mmol/L 3 9  --  3 8   CHLORIDE mmol/L 94*  --  93*   CO2 mmol/L 25  --  28   BUN mg/dL 21  --  28*   CREATININE mg/dL 0 74  --  0 96   CALCIUM mg/dL 8 6  --  8 5   TOTAL PROTEIN g/dL  --   --  5 8*   BILIRUBIN TOTAL mg/dL  --   --  0 20   ALK PHOS U/L  --   --  67   ALT U/L  --   --  19   AST U/L  --   --  18   GLUCOSE RANDOM mg/dL 73  --  126   < > = values in this interval not displayed  Results from last 7 days  Lab Units 08/12/18 2026   INR  0 98       * I Have Reviewed All Lab Data Listed Above  * Additional Pertinent Lab Tests Reviewed:  All Labs Within Last 24 Hours Reviewed    Cultures:   Blood Culture: No results found for: BLOODCX  Urine Culture: No results found for: URINECX  Sputum Culture: No components found for: SPUTUMCX  Wound Culture: No results found for: WOUNDCULT    Last 24 Hours Medication List:     Current Facility-Administered Medications:  busPIRone 5 mg Oral BID Rob Tran MD    heparin (porcine) 5,000 Units Subcutaneous Q8H Carinastvania Sarmiento MD    OXcarbazepine 300 mg Oral Q12H [facility] Rob Tran MD    sodium chloride 50 mL/hr Intravenous Continuous Rob Tran MD Last Rate: 50 mL/hr (08/12/18 6389)   tamsulosin 0 4 mg Oral Daily With Gm Francis MD    traZODone 50 mg Oral HS Rob Tran MD    valACYclovir 500 mg Oral TID Rob Tran MD         Today, Patient Was Seen By: Lena OfficerDO    ** Please Note: Dragon 360 Dictation voice to text software may have been used in the creation of this document   **

## 2018-08-13 NOTE — CASE MANAGEMENT
Initial Clinical Review    Admission: Date/Time/Statement: 8/12/18 @ 2244     Orders Placed This Encounter   Procedures    Inpatient Admission     Standing Status:   Standing     Number of Occurrences:   1     Order Specific Question:   Admitting Physician     Answer:   Timi Trotter [[id number]]     Order Specific Question:   Level of Care     Answer:   Med Surg [16]     Order Specific Question:   Estimated length of stay     Answer:   More than 2 Midnights     Order Specific Question:   Certification     Answer:   I certify that inpatient services are medically necessary for this patient for a duration of greater than two midnights  See H&P and MD Progress Notes for additional information about the patient's course of treatment   Inpatient Admission (expected length of stay for this patient is greater than two midnights)     Standing Status:   Standing     Number of Occurrences:   1     Order Specific Question:   Admitting Physician     Answer:   Timi Trotter [[id number]]     Order Specific Question:   Level of Care     Answer:   Med Surg [16]     Order Specific Question:   Estimated length of stay     Answer:   More than 2 Midnights     Order Specific Question:   Certification     Answer:   I certify that inpatient services are medically necessary for this patient for a duration of greater than two midnights  See H&P and MD Progress Notes for additional information about the patient's course of treatment  ED: Date/Time/Mode of Arrival:   ED Arrival Information     Expected Arrival Acuity Means of Arrival Escorted By Service Admission Type    - 8/12/2018 20:10 Urgent Ambulance [id number] Urgent    Arrival Complaint    RIB PAIN, HEART PAIN          Chief Complaint:   Chief Complaint   Patient presents with    Multiple Falls     Patient fell this afternoon started to not feel good as the day went home  States that he thinks he tripped  He also states that he's been fall alot more  History of Illness: Jolanta Orellana is a 74 y o  male with a PMH of Depression/Anxiety, Prostate Cancer and Chronic Headaches who presents with a fall  He is here with his family who also provide some of the history  He states that several days ago he slipped on wet grass and landed on his buttocks  He denied any LOC of head injury  Today while carrying a heavy toolbox he states that he fell on his left side  He states that he fell due to the heaviness of the box  He denied any LOC or head injury  He began to have left sided chest/rib pain and came to the ED  Currently he reports no chest pain or shortness of breath  ED Vital Signs:   ED Triage Vitals   Temperature Pulse Respirations Blood Pressure SpO2   08/12/18 2016 08/12/18 2013 08/12/18 2013 08/12/18 2013 08/12/18 2013   97 6 °F (36 4 °C) (!) 53 20 159/69 98 %      Temp Source Heart Rate Source Patient Position - Orthostatic VS BP Location FiO2 (%)   08/12/18 2016 08/13/18 0306 08/12/18 2334 08/12/18 2334 --   Oral Monitor Lying Left arm       Pain Score       08/12/18 2013       6        Wt Readings from Last 1 Encounters:   08/13/18 69 3 kg (152 lb 12 5 oz)       Vital Signs (abnormal): Abnormal Labs/Diagnostic Test Results:   HEMOGLOBIN g/dL 10 2*   HEMATOCRIT % 31 6*     SODIUM mmol/L 124*     CHLORIDE mmol/L 93*     BUN mg/dL 28*     CXR  No acute cardiopulmonary disease  CT HEAD   No acute intracranial abnormality  Mild posterior scalp soft tissue swelling without calvarial fracture      Complete opacification and expansion of the left maxillary sinus with heterogeneous soft tissue demonstrating central increased density and calcification  Findings appear chronic although the expansion has progressed now with some opacification of the   left ethmoid air cells  There is no overt bony destruction  Findings favor chronic sinusitis/mucocele although chronic fungal disease could potentially look similar    Nonemergent ENT consultation recommended  ED Treatment:   Medication Administration from 08/12/2018 2010 to 08/12/2018 2321     None          Past Medical/Surgical History: Active Ambulatory Problems     Diagnosis Date Noted    Benign hypertension 11/28/2016    Aortic stenosis, moderate 12/07/2016    Acquired deformity of right foot 05/07/2018    [facility] 05/07/2018    Metatarsalgia of both feet 05/07/2018    Congenital pes planus 05/07/2018    Pain in both feet 06/11/2018    Onychomycosis 06/11/2018    Right foot pain 06/11/2018     Resolved Ambulatory Problems     Diagnosis Date Noted    Chest pain 11/28/2016     Past Medical History:   Diagnosis Date    Abnormal blood chemistry 09/21/2009    Aortic stenosis, moderate     Arthritis 05/09/2011    Backache     Benign essential HTN     Cataract     Depression with anxiety     Diarrhea     D[facility]deep [facility] thrombosis[facility] 09/09/2011    Generalized anxiety disorder 08/10/2009    Glaucoma     Hearing problem     History of herniated intervertebral disc 04/28/2004    Memory loss     Pro[facility] cancer [facility] )     PVD (peripheral vascular disease) ([place] [id number] ) 04/28/2004    Skin cancer of nose     Skin cancer of nose        Admitting Diagnosis: Hyponatremia [E87 1]  Heart pain [R07 9]  Rib pain [R07 81]  Fall, initial encounter [W19  XXXA]    Age/Sex: 74 y o  male    Assessment/Plan:   Frequent falls   Assessment & Plan     both times seem to be mechanical in nature  CT head is negative for an acute process  Will place on fall precautions, check a Vitamin B12 level and have PT/OT evaluate the pt for discharge needs   Hyponatremia   Assessment & Plan     can be secondary to Trileptal use   Will hydrate gently with IV NS, check urine/serum urine osmolality, urine sodium, check Na every 4 hours and consult Nephrology    Chest pain   Assessment & Plan     likely secondary to trauma    CXR with no acute rib fracture appreciated (official read pending)   EKG reveals sinus bradycardia   Initial troponin is WNL   Will contineu to trend troponin and monitor on telemetry   Shingles   Assessment & Plan     Continue Valtrex   Depression   Assessment & Plan     Continue Buspar   Chronic headache   Assessment & Plan     family report he was recently started on Trileptal   Will resume for now    VTE Prophylaxis: Heparin   Code Status: Prior  Anticipated Length of Stay:  Patient will be admitted on an Inpatient basis with an anticipated length of stay of atleast 2 midnights         Admission Orders:  TELE MON  FALL PRECAUTIONS  DAILY WEIGHT I/O  NA  CONSULT NEPHROLOGY  Scheduled Meds:   Current Facility-Administered Medications:  busPIRone 5 mg Oral BID Fang Gant MD    heparin (porcine) 5,000 Units Subcutaneous Q8H [address] Fang Gant MD    OXcarbazepine 300 mg Oral Q12H [address] Fang Gant MD    sodium chloride 50 mL/hr Intravenous Continuous Fang Gant MD Last Rate: 50 mL/hr (08/12/18 2338)   tamsulosin 0 4 mg Oral Daily With Pio Cannon MD    traZODone 50 mg Oral HS Fang Gant MD    valACYclovir 500 mg Oral TID Fang Gant MD      Continuous Infusions:   sodium chloride 50 mL/hr Last Rate: 50 mL/hr (08/12/18 2338)     PRN Meds:

## 2018-08-13 NOTE — SOCIAL WORK
DASH discussion completed  Discussed goals of making sure pt's needs are met upon discharge, pt's preferences are taken into account, pt understands her health condition, medications and symptoms to watch for after returning home and pt is aware of any follow up appointments recommended by hospital physician  Spoke with the pt at the bedside  Pt stated that he lives with his wife and he has a cane and walker at home  Pt does have VNA services through [facility]  Pt reports that he continues to drive and did not feel he would need anything further for DCP needs    Pt uses the [facility] in [place], [place]

## 2018-08-13 NOTE — ASSESSMENT & PLAN NOTE
Nephrology input noted  Sodium up to 128 today  Possible etiologies include volume depletion versus SIADH related to trileptal   On low-dose IV normal saline as well as fluid restriction    Asthma Jimmy Dux testing pending

## 2018-08-13 NOTE — CASE MANAGEMENT
Continued Stay Review    Date: 8/13/18    Vital Signs: BP (!) 184/86 (BP Location: Right arm)   Pulse 56   Temp 97 5 °F (36 4 °C) (Tympanic)   Resp 20   Ht 5' 11" (1 803 m)   Wt 69 3 kg (152 lb 12 5 oz)   SpO2 98%   BMI 21 31 kg/m²     gmf  Na   tsh  Cortisol uric acid  Bmp   Fluid restriction 1800 ml  Medications:   Scheduled Meds:   Current Facility-Administered Medications:  busPIRone 5 mg Oral BID Tarah Hernández MD    heparin (porcine) 5,000 Units Subcutaneous Q8H [address] Tarah Hernández MD    OXcarbazepine 300 mg Oral Q12H [address] Tarah Hernández MD    sodium chloride 50 mL/hr Intravenous Continuous Tarah Hernández MD Last Rate: 50 mL/hr (08/12/18 2338)   tamsulosin 0 4 mg Oral Daily With Radha Lee MD    traZODone 50 mg Oral HS Tarah Hernández MD    valACYclovir 500 mg Oral TID Tarah Hernández MD      Continuous Infusions:   sodium chloride 50 mL/hr Last Rate: 50 mL/hr (08/12/18 2338)     PRN Meds:     Abnormal Labs/Diagnostic Results: >128 CL 94 TROPONIN <0 02 X3     Age/Sex: 74 y o  male     Nephrology  ASSESSMENT and PLAN:  1  Hyponatremia  · Will check serum osmolality to confirm if truly hypoosmolar hyponatremia  · Suspect a component of volume depletion given improvement overnight with IVF  However, there may be an SIADH component given the recent start of Trileptal    · Will check urine studies, TSH, cortisol, uric acid  · At this time, would continue with IVF with NS at 50 cc/hr  · May continue Trileptal for now but might need to stop if Na fails to improve  · Add fluid restriction of 1800 cc/24 hours  2  Fall     SUMMARY OF RECOMMENDATIONS:  · Continue IVF with NS at 50 cc/hr  · Check serum osmolality, urine studies, TSH, cortisol, uric acid  · Add fluid restriction of 1800 cc/24 hours  · May continue Trileptal for now but will likely need to stop if Na does not improve       ATTENDING  Frequent falls   CT head is negative for an acute process    Will place on fall precautions, check a Vitamin B12 level and have PT/OT evaluate   Hyponatremia   Assessment & Plan     Nephrology input noted  Sodium up to 128 today  Possible etiologies include volume depletion versus SIADH related to trileptal   On low-dose IV normal saline as well as fluid restriction  Osmalarity testing pending     Shingles   Assessment & Plan     Continue Valtrex     Chest pain   Assessment & Plan     likely secondary to trauma   CXR with no acute rib fracture appreciated  EKG reveals sinus bradycardia    troponin negative   Ok to stop tele   Certification Statement: The patient will continue to require additional inpatient hospital stay due to Hyponatremia

## 2018-08-13 NOTE — OCCUPATIONAL THERAPY NOTE
OT EVALUATION     08/13/18 1052   Note Type   Note type Eval only   Restrictions/Precautions   Other Precautions Chair Alarm; Bed Alarm; Fall Risk;Pain   Pain Assessment   Pain Assessment 0-10   Pain Score 2   Pain Type Acute pain   Pain Location Hip   Pain Orientation Left   Home Living   Type of [address] Two level;1/2 bath on main level;Bed/bath upstairs;Stairs to enter with rails   Bathroom Shower/Tub Tub/shower unit   Home Equipment Walker;Cane   Prior Function   Level of [place] Independent with ADLs and functional mobility   Lives With Spouse   Receives Help From Family; [address] in the last 6 months 1 to 4   Comments Prior to hospitalization pt was living a rafia with his wife in a 2 floor home, 2 TAYA  B and B upstairs  He was independent with all self care and functional mobility, used a SPC intermittently  Iindependent with self care and functional mobility in a tub shower, no seat  Wife cooks and manages medications, does laundry   he drives and does the grocery shopping   ADL   Eating Assistance [address] [facility] 4  Minimal Assistance   LB Bathing Deficit Steadying;Supervision/safety   [address] 5  Supervision/Setup   UB Dressing Deficit Setup   LB Dressing Assistance 4  Minimal Assistance   LB Dressing Deficit Steadying;Supervision/safety   Toileting Assistance  4  Minimal Assistance   Toileting Deficit Steadying;Supervison/safety   Transfers   Sit to Stand 4  Minimal assistance   Stand to Sit 4  Minimal assistance   Stand pivot 5  Supervision   Balance   Static Sitting Good   Dynamic Sitting Good   Static Standing Fair +   Dynamic Standing Fair   Activity Tolerance   Activity Tolerance Patient tolerated treatment well   RUE Assessment   RUE Assessment WNL   LUE Assessment   LUE Assessment WNL   Hand Function   Gross Motor Coordination Functional   Fine Motor Coordination Functional   Vision-Basic Assessment   Current Vision Wears glasses for distance only   Cognition   Overall Cognitive Status WFL   Arousal/Participation Alert; Responsive; Cooperative   Attention Within functional limits   Orientation Level Oriented X4   Memory Within functional limits   Following Commands Follows all commands and directions without difficulty   Assessment   Limitation Decreased self-care trans;Decreased high-level ADLs   Prognosis Good   Assessment Patient evaluated by Occupational Therapy  Patient admitted with Frequent falls  The patients occupational profile, medical and therapy history includes a expanded review of medical and/or therapy records and additional review of physical, cognitive, or psychosocial history related to current functional performance  Comorbidities affecting functional mobility and ADLS include: depression  Prior to admission, patient was independent with functional mobility with SPC, independent with ADLS, independent with IADLS and living with wife in a 2 level home with 2 steps to enter  The evaluation identifies the following performance deficits: weakness, decreased ROM, impaired balance, decreased endurance, decreased coordination, increased fall risk, new onset of impairment of functional mobility, decreased ADLS, decreased IADLS, decreased activity tolerance and impaired judgement, that result in activity limitations and/or participation restrictions  This evaluation requires clinical decision making of moderate complexity, because the patient may present with comorbidities that affect occupational performance and required minimal or moderate modification of tasks or assistance with the consideration of several treatment options  The Barthel Index was used as a functional outcome tool presenting with a score of 60, indicating marked limitations of functional mobility and ADLS  Patient will benefit from skilled Occupational Therapy services to address above deficits and facilitate a safe return to prior level of function  Goals   Patient Goals go home today   STG Time Frame (1-7 days)   Short Term Goal #1 Patient will increase standing tolerance to 3 minutes during ADL task to decrease assistance level and decrease fall risk; Patient will increase bed mobility to IND in preparation for ADLS and transfers; Patient will increase functional mobility to and from bathroom with RW SPV to increase performance with ADLS and to use a toilet; Patient will improve functional activity tolerance to 15 minutes of sustained functional tasks to increase participation in basic self-care and decrease assistance level;Patient will increase dynamic standing balance to F+ to improve postural stability and decrease fall risk during standing ADLS and transfers  LTG Time Frame (8-14 days)   Long Term Goal #1 Patient will increase standing tolerance to 6 minutes during ADL task to decrease assistance level and decrease fall risk; Patient will increase functional mobility to and from bathroom with RW IND to increase performance with ADLS and to use a toilet;Patient will improve functional activity tolerance to 20 minutes of sustained functional tasks to increase participation in basic self-care and decrease assistance level;  Patient will increase dynamic standing balance to G to improve postural stability and decrease fall risk during standing ADLS and transfers  Functional Transfer Goals   Pt Will Perform All Functional Transfers (STG: SPV LTG: IND)   ADL Goals   Pt Will Perform Bathing (STG: LB with SPV and AD PRN LTG: LB with IND and AD PRN)   Pt Will Perform LE Dressing (STG: SPV adnd AD PRN LTG: IND and AD PRN)   Pt Will Perform Toileting (STG: SPV LTG: IND)   Plan   Treatment Interventions ADL retraining;Functional transfer training;UE strengthening/ROM; Endurance training;Patient/family training;Equipment evaluation/education; Neuromuscular reeducation; Compensatory technique education   Goal Expiration Date 08/27/18   Treatment Day (0)   OT Frequency 3-5x/wk   Recommendation   OT Discharge Recommendation Home with family support  (and services)   Barthel Index   Feeding 10   Bathing 0   Grooming Score 5   Dressing Score 5   Bladder Score 10   Bowels Score 10   Toilet Use Score 5   Transfers (Bed/Chair) Score 10   Mobility (Level Surface) Score 0   Stairs Score 5   Barthel Index Score 61   Licensure   [place] License Number  Rogers Rosa OTR/L [id number]

## 2018-08-13 NOTE — CONSULTS
Inpatient Consultation - Nephrology   Thom Philippe 74 y o  male MRN: [id number]  Unit/Bed#: [address] [place] [address] Encounter: [id number]    ASSESSMENT and PLAN:  1  Hyponatremia  · Will check serum osmolality to confirm if truly hypoosmolar hyponatremia  · Suspect a component of volume depletion given improvement overnight with IVF  However, there may be an SIADH component given the recent start of Trileptal    · Will check urine studies, TSH, cortisol, uric acid  · At this time, would continue with IVF with NS at 50 cc/hr  · May continue Trileptal for now but might need to stop if Na fails to improve  · Add fluid restriction of 1800 cc/24 hours  2  Fall    SUMMARY OF RECOMMENDATIONS:  · Continue IVF with NS at 50 cc/hr  · Check serum osmolality, urine studies, TSH, cortisol, uric acid  · Add fluid restriction of 1800 cc/24 hours  · May continue Trileptal for now but will likely need to stop if Na does not improve  HISTORY OF PRESENT ILLNESS:  Requesting Physician: Carson Guzman,   Reason for Consult: Hyponatremia    Thom Philippe is a 74 y o  male who was [facility] to [facility] on [facility]/12/18 after presenting with a fall  A renal consultation is requested today for assistance in the management of hyponatremia  Mr Sammie Severance is, unfortunately, a poor historian and much of the information contained in this consult was obtained upon review of the medical record  He was admitted after a fall which he claims to have been mechanical in nature  He denied any previous dizziness, lightheadedness  There was no loss of consciousness noted  On admission, he was found to have a serum sodium of 124 and was administered intravenous fluids  Today, her serum sodium was up to 128  Due to the hyponatremia, a renal consultation was requested  Review of home medications indicate the use off Trileptal which was apparently recently started   Na level was 139 on 7/3/18 at which time the patient did not appear to be on Trileptal  PAST MEDICAL HISTORY:  Past Medical History:   Diagnosis Date    Abnormal blood chemistry 09/21/2009    Aortic stenosis, moderate     Arthritis 05/09/2011    localized primary osteoarthritis of lower leg    Backache     Benign essential HTN     last assessed 12/28/17    Cataract     Depression with anxiety     Diarrhea     DVT (deep venous thrombosis) ([facility]) 09/09/2011    Right Leg    Generalized anxiety disorder 08/10/2009    Glaucoma     last assesssed 2/11/13    Hearing problem     History of herniated intervertebral disc 04/28/2004    Memory loss     Pro[facility] [address] [facility]     PVD (peripheral vascular disease) ([facility] [address] ) 04/28/2004    Skin cancer of nose     Skin cancer of nose      PAST SURGICAL HISTORY:  Past Surgical History:   Procedure Laterality Date    BUNIONECTOMY      simple bunion exostectomy (silver procedure)    CATARACT EXTRACTION      resolved 2012    CYSTOSCOPY W/ URETERAL STENT PLACEMENT      EYE SURGERY Bilateral     laser, left-2012 , right-2015    FRACTURE SURGERY      spinal, resolved 8/15/10    HERNIA REPAIR      inguinal sliding    OTHER SURGICAL HISTORY      needle cath placement for brachyther applic into genitalia    PROSTATE BIOPSY      needle bx    RENAL ARTERY STENT      REPLACEMENT TOTAL KNEE BILATERAL      right-2012 , left-2016     ALLERGIES:  No Known Allergies    SOCIAL HISTORY:  History   Alcohol Use No     History   Drug Use No     History   Smoking Status    Never Smoker   Smokeless Tobacco    Never Used     FAMILY HISTORY:  Family History   Problem Relation Age of Onset    Alzheimer's disease Mother     Stroke Father         CVA    Diabetes Sister      MEDICATIONS:    Current Facility-Administered Medications:     busPIRone (BUSPAR) tablet 5 mg, 5 mg, Oral, BID, Miranda Severin, MD    heparin (porcine) subcutaneous injection 5,000 Units, 5,000 Units, Subcutaneous, Q8H [facility] & half-way, 5,000 Units at 08/13/18 0534 **AND** [CANCELED] Platelet count, , , Once, Christian Null MD    OXcarbazepine (TRILEPTAL) tablet 300 mg, 300 mg, Oral, Q12H [address], Christian Null MD, 300 mg at 08/13/18 0007    sodium chloride 0 9 % infusion, 50 mL/hr, Intravenous, Continuous, Christian Null MD, Last Rate: 50 mL/hr at 08/12/18 2338, 50 mL/hr at 08/12/18 2338    tamsulosin (FLOMAX) capsule 0 4 mg, 0 4 mg, Oral, Daily With Rizwan Polanco MD    traZODone (DESYREL) tablet 50 mg, 50 mg, Oral, HS, Christian Null MD, 50 mg at 08/12/18 2338    valACYclovir (VALTREX) tablet 500 mg, 500 mg, Oral, TID, Christian Null MD, 500 mg at 08/12/18 2338    REVIEW OF SYSTEMS:  Constitutional: Negative for anorexia, fever, chills, diaphoresis  HENT: Negative for postnasal drip  Eyes: Negative for visual disturbance  Respiratory: Negative for cough, shortness of breath and wheezing  Cardiovascular: Negative for chest pain, palpitations and leg swelling  Gastrointestinal: Negative for abdominal pain, constipation, diarrhea, nausea and vomiting  Genitourinary: Negative for dysuria  Endocrine: Negative for polyuria  Musculoskeletal: (+) arthralgias  Skin: Negative for rash  Neurological: Negative for focal weakness, headaches, dizziness  (+) memory loss  Hematological: Negative for easy bruising or bleeding  Psychiatric/Behavioral: Negative for confusion and sleep disturbance  All the systems were reviewed and were negative except as documented on the HPI      PHYSICAL EXAM:  Current Weight: Weight - Scale: 69 3 kg (152 lb 12 5 oz)  First Weight: Weight - Scale: 69 7 kg (153 lb 10 6 oz)  Vitals:    08/12/18 2334 08/13/18 0306 08/13/18 0600 08/13/18 0723   BP: 166/82 170/80  (!) 184/86   BP Location: Left arm Right arm  Right arm   Pulse: (!) 53 (!) 50  56   Resp: 20 20  20   Temp: 97 6 °F (36 4 °C) 97 5 °F (36 4 °C)     TempSrc: Tympanic Tympanic     SpO2: 100% 100%  98%   Weight: 69 7 kg (153 lb 10 6 oz)  69 3 kg (152 lb 12 5 oz)    Height: 5' 11" (1 803 m)          Intake/Output Summary (Last 24 hours) at 08/13/18 0929  Last data filed at 08/13/18 0723   Gross per 24 hour   Intake           439 17 ml   Output             1400 ml   Net          -960 83 ml     Physical Exam  General: conscious, coherent, cooperative, not in distress  Skin: warm, dry, good turgor  Eyes: pink conjunctivae, no scleral icterus  ENT: moist lips and mucous membranes  Neck: supple, no JVD  Chest/Lungs: clear breath sounds  CVS: distinct heart sounds, normal rate, regular rhythm  Abdomen: soft, non-tender, non-distended, normoactive bowel sounds  Extremities: no edema of extremities  : deferred   Neuro: awake, alert  Psych: appropriate affect       Invasive Devices:      Lab Results:     Results from last 7 days  Lab Units 08/13/18  0510 08/13/18  0046 08/12/18 2026   WBC Thousand/uL 4 48  --  6 31   HEMOGLOBIN g/dL 11 8*  --  10 2*   HEMATOCRIT % 36 7  --  31 6*   PLATELETS Thousands/uL 194  --  172   SODIUM mmol/L 128* 127* 124*   POTASSIUM mmol/L 3 9  --  3 8   CHLORIDE mmol/L 94*  --  93*   CO2 mmol/L 25  --  28   BUN mg/dL 21  --  28*   CREATININE mg/dL 0 74  --  0 96   CALCIUM mg/dL 8 6  --  8 5   TOTAL PROTEIN g/dL  --   --  5 8*   BILIRUBIN TOTAL mg/dL  --   --  0 20   ALK PHOS U/L  --   --  67   ALT U/L  --   --  19   AST U/L  --   --  18   GLUCOSE RANDOM mg/dL 73  --  126     Other Studies:

## 2018-08-13 NOTE — ASSESSMENT & PLAN NOTE
likely secondary to trauma  CXR with no acute rib fracture appreciated  EKG reveals sinus bradycardia  troponin negative   Ok to stop tele

## 2018-08-13 NOTE — H&P
History and Physical - [facility] Internal Medicine    Patient Information: Thom hPilippe 74 y o  male MRN: [id number]  Unit/Bed#: ED 04 Encounter: [id number]  Admitting Physician: Beverly Domingo MD  PCP: Giovani Mars MD  Date of Admission:  08/12/18    Chief Complaint:     Fall, left chest pain    of Present Illness:    Thom Philippe is a 74 y o  male with a PMH of Depression/Anxiety, Prostate Cancer and Chronic Headaches who presents with a fall  He is here with his family who also provide some of the history  He states that several days ago he slipped on wet grass and landed on his buttocks  He denied any LOC of head injury  Today while carrying a heavy toolbox he states that he fell on his left side  He states that he fell due to the heaviness of the box  He denied any LOC or head injury  He began to have left sided chest/rib pain and came to the ED  Currently he reports no chest pain or shortness of breath  He denies any complaints or concerns  Review of Systems:    Review of Systems   Constitutional: Negative for fever  Respiratory: Negative for shortness of breath  Cardiovascular: Positive for chest pain  Negative for leg swelling  Gastrointestinal: Positive for abdominal pain  Negative for blood in stool  Genitourinary: Negative for hematuria  Musculoskeletal: Positive for arthralgias  Neurological: Negative for syncope  Psychiatric/Behavioral: The patient is nervous/anxious          Past Medical and Surgical History:     Past Medical History:   Diagnosis Date    Abnormal blood chemistry 09/21/2009    Aortic stenosis, moderate     Arthritis 05/09/2011    localized primary osteoarthritis of lower leg    Backache     Benign essential HTN     last assessed 12/28/17    Cataract     Depression with anxiety     Diarrhea     DVT (deep venous thrombosis) ([facility]) 09/09/2011    Right Leg    Generalized anxiety disorder 08/10/2009    Glaucoma     last assesssed 2/11/13    Hearing problem     History of herniated intervertebral disc 04/28/2004    Memory loss     Pro[facility] [address] [facility]     PVD (peripheral vascular disease) ([facility] [address] ) 04/28/2004    Skin cancer of nose     Skin cancer of nose        Past Surgical History:   Procedure Laterality Date    BUNIONECTOMY      simple bunion exostectomy (silver procedure)    CATARACT EXTRACTION      resolved 2012    CYSTOSCOPY W/ URETERAL STENT PLACEMENT      EYE SURGERY Bilateral     laser, left-2012 , right-2015    FRACTURE SURGERY      spinal, resolved 8/15/10    HERNIA REPAIR      inguinal sliding    OTHER SURGICAL HISTORY      needle cath placement for brachyther applic into genitalia    PROSTATE BIOPSY      needle bx    RENAL ARTERY STENT      REPLACEMENT TOTAL KNEE BILATERAL      right-2012 , left-2016       Meds/Allergies:    PTA meds:   Prior to Admission Medications   Prescriptions Last Dose Informant Patient Reported? Taking?    OXcarbazepine (TRILEPTAL) 300 mg tablet  Self Yes Yes   Sig: Take 300 mg by mouth every 12 (twelve) hours     aspirin-acetaminophen-caffeine (EXCEDRIN EXTRA STRENGTH) 250-250-65 MG per tablet  Spouse/Significant Other Yes Yes   Sig: Take 1 tablet by mouth every 6 (six) hours as needed for headaches   bimatoprost (LUMIGAN) 0 01 % ophthalmic drops  Self Yes Yes   Sig: Administer 1 drop to both eyes daily at bedtime     brimonidine-timolol (COMBIGAN) 0 2-0 5 %  Self Yes Yes   Sig: Administer 1 drop to both eyes 2 (two) times a day   busPIRone (BUSPAR) 5 mg tablet   No Yes   Sig: Take 1 tablet (5 mg total) by mouth 2 (two) times a day   celecoxib (CeleBREX) 200 mg capsule   No Yes   Sig: Take 1 capsule (200 mg total) by mouth daily As needed for headache, joint or muscle pain   lidocaine (LIDODERM) 5 %   No No   Sig: Place 1 patch on the skin daily Remove & Discard patch within 12 hours or as directed by MD   tamsulosin (FLOMAX) 0 4 mg  Self Yes Yes   Sig: Take 0 4 mg by mouth     traZODone (DESYREL) 50 mg tablet   No Yes   Sig: Take 1 tablet (50 mg total) by mouth daily at bedtime   valACYclovir (VALTREX) 500 mg tablet   No Yes   Sig: Take 1 tablet (500 mg total) by mouth 3 (three) times a day for 7 days      Facility-Administered Medications: None       Allergies: No Known Allergies  History:     Marital Status: /Civil Union   History   Alcohol Use No     History   Smoking Status    Never Smoker   Smokeless Tobacco    Never Used     History   Drug Use No       Family History: Mother: Alzheimer's Dementia   Father: unknown     Physical Exam:     Vitals:   Blood Pressure: 159/69 (08/12/18 2013)  Pulse: (!) 53 (08/12/18 2013)  Temperature: 97 6 °F (36 4 °C) (08/12/18 2016)  Temp Source: Oral (08/12/18 2016)  Respirations: 20 (08/12/18 2013)  SpO2: 98 % (08/12/18 2013)    Physical Exam:   General: in no acute distress  HEENT: atraumatic, normocephalic  Skin: slight rash across abdomen   CVS: RRR, no murmurs appreciated  Lungs: CTAL, no wheezing or rales appreciated  Abdomen: soft, nondistended, bowel sounds normal, nontender upon palpation, no guarding or rebound tenderness  Extremities: no edema, no calf swelling or tenderness  Neuro: alert and oriented x3, normal handgrip strength bilaterally, sensation intact in all extremities  Psych: calm, cooperative      Lab Results: I have personally reviewed pertinent reports          Results from last 7 days  Lab Units 08/12/18 2026   WBC Thousand/uL 6 31   HEMOGLOBIN g/dL 10 2*   HEMATOCRIT % 31 6*   PLATELETS Thousands/uL 172   NEUTROS PCT % 69   LYMPHS PCT % 19   MONOS PCT % 8   EOS PCT % 3       Results from last 7 days  Lab Units 08/12/18 2026   SODIUM mmol/L 124*   POTASSIUM mmol/L 3 8   CHLORIDE mmol/L 93*   CO2 mmol/L 28   BUN mg/dL 28*   CREATININE mg/dL 0 96   CALCIUM mg/dL 8 5   TOTAL PROTEIN g/dL 5 8*   BILIRUBIN TOTAL mg/dL 0 20   ALK PHOS U/L 67   ALT U/L 19   AST U/L 18   GLUCOSE RANDOM mg/dL 126       Results from last 7 days  Lab Units 08/12/18 2026   INR  0 98       Imaging:     No results found  Assessment/Plan    * Frequent falls   Assessment & Plan    both times seem to be mechanical in nature  CT head is negative for an acute process  Will place on fall precautions, check a Vitamin B12 level and have PT/OT evaluate the pt for discharge needs        Hyponatremia   Assessment & Plan    can be secondary to Trileptal use  Will hydrate gently with IV NS, check urine/serum urine osmolality, urine sodium, check Na every 4 hours and consult Nephrology         Chest pain   Assessment & Plan    likely secondary to trauma  CXR with no acute rib fracture appreciated (official read pending)  EKG reveals sinus bradycardia  Initial troponin is WNL  Will contineu to trend troponin and monitor on telemetry        Shingles   Assessment & Plan    Continue Valtrex        Depression   Assessment & Plan    Continue Buspar        Chronic headache   Assessment & Plan    family report he was recently started on Trileptal   Will resume for now             Hospital Problem List:     Principal Problem:    Frequent falls  Active Problems:    Hyponatremia    Chest pain    Chronic headache    Depression    Shingles      VTE Prophylaxis: Heparin   Code Status: Prior    Anticipated Length of Stay:  Patient will be admitted on an Inpatient basis with an anticipated length of stay of atleast 2 midnights  Total Time for Visit, including Counseling / Coordination of Care: 30 minutes  Greater than 50% of this total time spent on direct patient counseling and coordination of care

## 2018-08-13 NOTE — ED PROVIDER NOTES
History  Chief Complaint   Patient presents with    Multiple Falls     Patient fell this afternoon started to not feel good as the day went home  States that he thinks he tripped  He also states that he's been fall alot more  History provided by:  Significant other and patient   used: No    Fall   Mechanism of injury: fall    Mechanism of injury comment:  Trip, fall while carrying toolbox  Injury location:  Torso (Left chest)  Torso injury location:  L chest  Time since incident:  6 hours  Arrived directly from scene: no    Fall:     Fall occurred:  Standing    Impact surface:  Hard floor    Point of impact: Left Chest     Entrapped after fall: no    Suspicion of alcohol use: no    Suspicion of drug use: no    Prior to arrival data:     Bystander interventions:  None    Patient ambulatory at scene: no    Associated symptoms: chest pain    Associated symptoms: no abdominal pain, no back pain, no difficulty breathing, no headaches, no hearing loss, no loss of consciousness, no nausea, no neck pain, no seizures and no vomiting        Prior to Admission Medications   Prescriptions Last Dose Informant Patient Reported? Taking?    OXcarbazepine (TRILEPTAL) 300 mg tablet  Self Yes Yes   Sig: Take 300 mg by mouth every 12 (twelve) hours     aspirin-acetaminophen-caffeine (EXCEDRIN EXTRA STRENGTH) 250-250-65 MG per tablet  Spouse/Significant Other Yes Yes   Sig: Take 1 tablet by mouth every 6 (six) hours as needed for headaches   bimatoprost (LUMIGAN) 0 01 % ophthalmic drops  Self Yes Yes   Sig: Administer 1 drop to both eyes daily at bedtime     brimonidine-timolol (COMBIGAN) 0 2-0 5 %  Self Yes Yes   Sig: Administer 1 drop to both eyes 2 (two) times a day   busPIRone (BUSPAR) 5 mg tablet   No Yes   Sig: Take 1 tablet (5 mg total) by mouth 2 (two) times a day   celecoxib (CeleBREX) 200 mg capsule   No Yes   Sig: Take 1 capsule (200 mg total) by mouth daily As needed for headache, joint or muscle pain   lidocaine (LIDODERM) 5 %   No No   Sig: Place 1 patch on the skin daily Remove & Discard patch within 12 hours or as directed by MD   tamsulosin (FLOMAX) 0 4 mg  Self Yes Yes   Sig: Take 0 4 mg by mouth     traZODone (DESYREL) 50 mg tablet   No Yes   Sig: Take 1 tablet (50 mg total) by mouth daily at bedtime   valACYclovir (VALTREX) 500 mg tablet   No Yes   Sig: Take 1 tablet (500 mg total) by mouth 3 (three) times a day for 7 days      Facility-Administered Medications: None       Past Medical History:   Diagnosis Date    Abnormal blood chemistry 09/21/2009    Aortic stenosis, moderate     Arthritis 05/09/2011    localized primary osteoarthritis of lower leg    Backache     Benign essential HTN     last assessed 12/28/17    Cataract     Depression with anxiety     Diarrhea     [facility] 09/09/2011    Right Leg    Generalized anxiety disorder 08/10/2009    Glaucoma     last assesssed 2/11/13    Hearing problem     History of herniated intervertebral disc 04/28/2004    Memory loss     Prostate cancer [facility] [address] )     PVD (peripheral vascular disease) ([facility] [address] ) 04/28/2004    Skin cancer of nose     Skin cancer of nose        Past Surgical History:   Procedure Laterality Date    BUNIONECTOMY      simple bunion exostectomy (silver procedure)    CATARACT EXTRACTION      resolved 2012    CYSTOSCOPY W/ URETERAL STENT PLACEMENT      EYE SURGERY Bilateral     laser, left-2012 , right-2015    FRACTURE SURGERY      spinal, resolved 8/15/10    HERNIA REPAIR      inguinal sliding    OTHER SURGICAL HISTORY      needle cath placement for brachyther applic into genitalia    PROSTATE BIOPSY      needle bx    RENAL ARTERY STENT      REPLACEMENT TOTAL KNEE BILATERAL      right-2012 , left-2016       Family History   Problem Relation Age of Onset    Alzheimer's disease Mother     Stroke Father         CVA    Diabetes Sister      I have reviewed and agree with the history as documented  Social History   Substance Use Topics    Smoking status: Never Smoker    Smokeless tobacco: Never Used    Alcohol use No        Review of Systems   Constitutional: Negative for activity change, appetite change, chills, fatigue and fever  HENT: Negative for congestion, dental problem, facial swelling, hearing loss, sore throat, tinnitus and trouble swallowing  Eyes: Negative for pain, discharge and itching  Respiratory: Negative for apnea, chest tightness and wheezing  Cardiovascular: Positive for chest pain  Negative for palpitations and leg swelling  Gastrointestinal: Negative for abdominal pain, nausea and vomiting  Genitourinary: Negative for difficulty urinating, dysuria and flank pain  Musculoskeletal: Negative for arthralgias, back pain, gait problem, joint swelling and neck pain  Skin: Negative for color change, rash and wound  Neurological: Negative for dizziness, seizures, loss of consciousness, facial asymmetry and headaches  Psychiatric/Behavioral: Negative for agitation and behavioral problems  The patient is not nervous/anxious  All other systems reviewed and are negative  Physical Exam  Physical Exam   Constitutional: He is oriented to person, place, and time  He appears well-developed and well-nourished  No distress  HENT:   Head: Normocephalic and atraumatic  Right Ear: External ear normal    Left Ear: External ear normal    Eyes: EOM are normal  Pupils are equal, round, and reactive to light  Right eye exhibits no discharge  Left eye exhibits no discharge  Neck: Normal range of motion  Neck supple  No tracheal deviation present  No thyromegaly present  Cardiovascular: Normal rate and regular rhythm  No murmur heard  Pulmonary/Chest: Effort normal and breath sounds normal        Abdominal: Soft  Bowel sounds are normal  He exhibits no distension  There is no tenderness  Musculoskeletal: Normal range of motion   He exhibits no edema or deformity  Cervical back: He exhibits no tenderness  Thoracic back: He exhibits no tenderness  Lumbar back: He exhibits no tenderness  Neurological: He is alert and oriented to person, place, and time  No cranial nerve deficit  He exhibits normal muscle tone  Skin: Skin is warm  Capillary refill takes less than 2 seconds  He is not diaphoretic  Psychiatric: He has a normal mood and affect  His behavior is normal    Nursing note and vitals reviewed        Vital Signs  ED Triage Vitals   Temperature Pulse Respirations Blood Pressure SpO2   08/12/18 2016 08/12/18 2013 08/12/18 2013 08/12/18 2013 08/12/18 2013   97 6 °F (36 4 °C) (!) 53 20 159/69 98 %      Temp Source Heart Rate Source Patient Position - Orthostatic VS BP Location FiO2 (%)   08/12/18 2016 -- -- -- --   Oral          Pain Score       08/12/18 2013       6           Vitals:    08/12/18 2013   BP: 159/69   Pulse: (!) 53       Visual Acuity      ED Medications  Medications - No data to display    Diagnostic Studies  Results Reviewed     Procedure Component Value Units Date/Time    APTT [[id number]]  (Normal) Collected:  08/12/18 2026    Lab Status:  Final result Specimen:  Blood from Arm, Right Updated:  08/12/18 2128     PTT 31 seconds     Protime-INR [[id number]]  (Normal) Collected:  08/12/18 2026    Lab Status:  Final result Specimen:  Blood from Arm, Right Updated:  08/12/18 2128     Protime 10 3 seconds      INR 0 98    Troponin I [[id number]]  (Normal) Collected:  08/12/18 2026    Lab Status:  Final result Specimen:  Blood from Arm, Right Updated:  08/12/18 2128     Troponin I <0 02 ng/mL     Sodium, urine, random [[id number]]     Lab Status:  No result Specimen:  Urine     UA w Reflex to Microscopic w Reflex to Culture [[id number]]     Lab Status:  No result Specimen:  Urine     Comprehensive metabolic panel [[id number]]  (Abnormal) Collected:  08/12/18 2026    Lab Status:  Final result Specimen:  Blood from Arm, Right Updated: 08/12/18 2110     Sodium 124 (L) mmol/L      Potassium 3 8 mmol/L      Chloride 93 (L) mmol/L      CO2 28 mmol/L      Anion Gap 3 (L) mmol/L      BUN 28 (H) mg/dL      Creatinine 0 96 mg/dL      Glucose 126 mg/dL      Calcium 8 5 mg/dL      AST 18 U/L      ALT 19 U/L      Alkaline Phosphatase 67 U/L      Total Protein 5 8 (L) g/dL      Albumin 3 1 (L) g/dL      Total Bilirubin 0 20 mg/dL      eGFR 77 ml/min/1 73sq m     Narrative:         National Kidney Disease Education Program recommendations are as follows:  GFR calculation is accurate only with a steady state creatinine  Chronic Kidney disease less than 60 ml/min/1 73 sq  meters  Kidney failure less than 15 ml/min/1 73 sq  meters      CBC and differential [[id number]]  (Abnormal) Collected:  08/12/18 2026    Lab Status:  Final result Specimen:  Blood from Arm, Right Updated:  08/12/18 2041     WBC 6 31 Thousand/uL      RBC 3 85 (L) Million/uL      Hemoglobin 10 2 (L) g/dL      Hematocrit 31 6 (L) %      MCV 82 fL      MCH 26 5 (L) pg      MCHC 32 3 g/dL      RDW 14 4 %      MPV 10 4 fL      Platelets 293 Thousands/uL      nRBC 0 /100 WBCs      Neutrophils Relative 69 %      Immat GRANS % 0 %      Lymphocytes Relative 19 %      Monocytes Relative 8 %      Eosinophils Relative 3 %      Basophils Relative 1 %      Neutrophils Absolute 4 39 Thousands/µL      Immature Grans Absolute 0 02 Thousand/uL      Lymphocytes Absolute 1 17 Thousands/µL      Monocytes Absolute 0 50 Thousand/µL      Eosinophils Absolute 0 18 Thousand/µL      Basophils Absolute 0 05 Thousands/µL                  X-ray chest 1 view portable    (Results Pending)   CT head without contrast    (Results Pending)              Procedures  ECG 12 Lead Documentation  Date/Time: 8/12/2018 9:33 PM  Performed by: Gaby Allison  Authorized by: Gaby Allison     Indications / Diagnosis:  Fall  ECG reviewed by me, the ED Provider: yes    Interpretation:     Interpretation: abnormal    Rate:     ECG rate: 53  Rhythm:     Rhythm: sinus bradycardia    Ectopy:     Ectopy: none    QRS:     QRS axis:  Normal    QRS intervals:  Normal  Conduction:     Conduction: abnormal      Abnormal conduction: 1st degree    ST segments:     ST segments:  Normal  T waves:     T waves: normal             Phone Contacts  ED Phone Contact    ED Course                               MDM       Patient is a 33-year-old man with a history of prostate cancer, DVT presents emergency department for evaluation of left chest wall pain after a fall today  He was carrying tool box when he tripped and fell denies any syncope, not had hit his head or lose consciousness  Vital signs stable  No focal neurologic deficits  Left chest wall tenderness, no other bony tenderness  Abdomen soft, nontender  No blood thinners  No syncope, mechanical fall  Will check basic laboratory studies, chest x-ray to evaluate for rib fracture, pneumothorax  If injuries, will need further advanced imaging  EKG with sinus bradycardia, first-degree AV block  No ischemic changes  Laboratory studies with a sodium 124  Patient appears euvolemic on exam   Recent sodium 139 roughly 1 month ago  Potentially due to Tegretol that he started recently  Will add on CT head, urine studies  Hospitalist aware, awaiting CT head prior to admission  Care of patient to Dr Joseph Mayberry at 2130  Awaiting head CT, admission  Disposition pending head CT  CritCare Time    Disposition  Final diagnoses:   Fall, initial encounter   Hyponatremia     Time reflects when diagnosis was documented in both MDM as applicable and the Disposition within this note     Time User Action Codes Description Comment    8/12/2018  9:36 PM Naun [place] Fall, initial encounter     8/12/2018  9:36 PM Naun Moralez Add [E87 1] Hyponatremia       ED Disposition     None      Follow-up Information    None         Patient's Medications   Discharge Prescriptions    No medications on file     No discharge procedures on file      ED Provider  Electronically Signed by           Beverly Otreo MD  08/12/18 6130

## 2018-08-14 PROBLEM — R35.0 URINARY FREQUENCY: Status: ACTIVE | Noted: 2018-08-14

## 2018-08-14 PROBLEM — R07.9 CHEST PAIN: Status: RESOLVED | Noted: 2018-08-12 | Resolved: 2018-08-14

## 2018-08-14 LAB
ANION GAP SERPL CALCULATED.3IONS-SCNC: 7 MMOL/L (ref 4–13)
BUN SERPL-MCNC: 13 MG/DL (ref 5–25)
CALCIUM SERPL-MCNC: 8.8 MG/DL (ref 8.3–10.1)
CHLORIDE SERPL-SCNC: 90 MMOL/L (ref 100–108)
CO2 SERPL-SCNC: 24 MMOL/L (ref 21–32)
CORTIS SERPL-MCNC: 31.8 UG/DL
CORTIS SERPL-MCNC: 38 UG/DL
CORTIS SERPL-MCNC: 8.5 UG/DL
CREAT SERPL-MCNC: 0.63 MG/DL (ref 0.6–1.3)
GFR SERPL CREATININE-BSD FRML MDRD: 96 ML/MIN/1.73SQ M
GLUCOSE SERPL-MCNC: 88 MG/DL (ref 65–140)
MRSA NOSE QL CULT: NORMAL
POTASSIUM SERPL-SCNC: 3.8 MMOL/L (ref 3.5–5.3)
SODIUM SERPL-SCNC: 121 MMOL/L (ref 136–145)
TSH SERPL DL<=0.05 MIU/L-ACNC: 1.21 UIU/ML (ref 0.36–3.74)
URATE SERPL-MCNC: 2.5 MG/DL (ref 4.2–8)

## 2018-08-14 PROCEDURE — 80048 BASIC METABOLIC PNL TOTAL CA: CPT | Performed by: INTERNAL MEDICINE

## 2018-08-14 PROCEDURE — 84443 ASSAY THYROID STIM HORMONE: CPT | Performed by: INTERNAL MEDICINE

## 2018-08-14 PROCEDURE — 99233 SBSQ HOSP IP/OBS HIGH 50: CPT | Performed by: INTERNAL MEDICINE

## 2018-08-14 PROCEDURE — 97110 THERAPEUTIC EXERCISES: CPT

## 2018-08-14 PROCEDURE — 82533 TOTAL CORTISOL: CPT | Performed by: INTERNAL MEDICINE

## 2018-08-14 PROCEDURE — 99232 SBSQ HOSP IP/OBS MODERATE 35: CPT | Performed by: INTERNAL MEDICINE

## 2018-08-14 PROCEDURE — 84550 ASSAY OF BLOOD/URIC ACID: CPT | Performed by: INTERNAL MEDICINE

## 2018-08-14 RX ORDER — TOLVAPTAN 15 MG/1
7.5 TABLET ORAL ONCE
Status: COMPLETED | OUTPATIENT
Start: 2018-08-14 | End: 2018-08-14

## 2018-08-14 RX ORDER — COSYNTROPIN 0.25 MG/ML
0.25 INJECTION, POWDER, FOR SOLUTION INTRAMUSCULAR; INTRAVENOUS ONCE
Status: COMPLETED | OUTPATIENT
Start: 2018-08-14 | End: 2018-08-14

## 2018-08-14 RX ADMIN — BUSPIRONE HYDROCHLORIDE 5 MG: 5 TABLET ORAL at 09:48

## 2018-08-14 RX ADMIN — ACETAMINOPHEN 650 MG: 325 TABLET, FILM COATED ORAL at 07:36

## 2018-08-14 RX ADMIN — HEPARIN SODIUM 5000 UNITS: 5000 INJECTION, SOLUTION INTRAVENOUS; SUBCUTANEOUS at 15:20

## 2018-08-14 RX ADMIN — VALACYCLOVIR 500 MG: 500 TABLET, FILM COATED ORAL at 21:36

## 2018-08-14 RX ADMIN — TOLVAPTAN 7.5 MG: 15 TABLET ORAL at 09:49

## 2018-08-14 RX ADMIN — TAMSULOSIN HYDROCHLORIDE 0.4 MG: 0.4 CAPSULE ORAL at 15:30

## 2018-08-14 RX ADMIN — HEPARIN SODIUM 5000 UNITS: 5000 INJECTION, SOLUTION INTRAVENOUS; SUBCUTANEOUS at 05:14

## 2018-08-14 RX ADMIN — VALACYCLOVIR 500 MG: 500 TABLET, FILM COATED ORAL at 15:20

## 2018-08-14 RX ADMIN — TRAZODONE HYDROCHLORIDE 50 MG: 50 TABLET ORAL at 21:36

## 2018-08-14 RX ADMIN — ONDANSETRON 4 MG: 2 INJECTION INTRAMUSCULAR; INTRAVENOUS at 07:36

## 2018-08-14 RX ADMIN — BUSPIRONE HYDROCHLORIDE 5 MG: 5 TABLET ORAL at 17:20

## 2018-08-14 RX ADMIN — COSYNTROPIN 0.25 MG: 0.25 INJECTION, POWDER, LYOPHILIZED, FOR SOLUTION INTRAMUSCULAR; INTRAVENOUS at 15:24

## 2018-08-14 RX ADMIN — OXCARBAZEPINE 300 MG: 150 TABLET ORAL at 21:36

## 2018-08-14 RX ADMIN — HEPARIN SODIUM 5000 UNITS: 5000 INJECTION, SOLUTION INTRAVENOUS; SUBCUTANEOUS at 21:36

## 2018-08-14 RX ADMIN — VALACYCLOVIR 500 MG: 500 TABLET, FILM COATED ORAL at 09:48

## 2018-08-14 RX ADMIN — OXCARBAZEPINE 300 MG: 150 TABLET ORAL at 09:48

## 2018-08-14 NOTE — CASE MANAGEMENT
Continued Stay Review    Date: 8/14/18    Vital Signs: /70 (BP Location: Right arm)   Pulse 60   Temp 97 5 °F (36 4 °C) (Tympanic)   Resp 20   Ht 5' 11" (1 803 m)   Wt 68 5 kg (151 lb 0 2 oz)   SpO2 95%   BMI 21 06 kg/m²     SAMSCA 7 5MG X1 DOSE  URINARY RETENTION PROTOCOL  TYLENOL  IV ZOFRAN  IV CORTROSYN 0 25MG  BMP CBC DIFF  Medications:   Scheduled Meds:   Current Facility-Administered Medications:  acetaminophen 650 mg Oral Q6H PRN Henri Currie DO   busPIRone 5 mg Oral BID Alva Marmolejo MD   heparin (porcine) 5,000 Units Subcutaneous Q8H [address] Alva Marmolejo MD   ondansetron 4 mg Intravenous Q6H PRN Alva Marmolejo MD   OXcarbazepine 300 mg Oral Q12H [address] Alva Marmolejo MD   tamsulosin 0 4 mg Oral Daily With Ranjan Chen MD   traZODone 50 mg Oral HS Alva Marmolejo MD   valACYclovir 500 mg Oral TID Alva Marmolejo MD     Continuous Infusions:    PRN Meds:   acetaminophen    ondansetron    Abnormal Labs/Diagnostic Results:  CL 90 URIC ACID 2 5     Age/Sex: 74 y o  male     NEPHROLOGY  ASSESSMENT and PLAN:  1  Hypo-osmolar hyponatremia  · 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  Cortisol is low so adrenal insufficiency will need to be ruled out  · Na worsened to 121 today from 128 yesterday - stop IVF and give 1 dose of Tolvaptan 7 5 mg    · Recommend stopping Trileptal       2  Fall: Possibly related to hyponatremia  3  Low cortisol: Check latanya stim test       DISPOSITION:  · Stop IVF - done by me earlier today  · Tolvaptan 7 5 mg PO x 1    · Recommend stopping Trileptal    · Check latanya stim test    ATTENDING  Hyponatremia  Hypo osmolar, likely secondary to SIADH  No improvement with IV fluids  Uric acid level is low     TSH within normal limit  Cortisol level low normal  Possibly secondary to recent addition of Trileptal  Nephrology input appreciated  Dose of tolvaptan today  Will discontinue Trileptal  Monitor  Shingles   Assessment & Plan     Continue Valtrex   Certification Statement: The patient will continue to require additional inpatient hospital stay due to Hyponatremia

## 2018-08-14 NOTE — PLAN OF CARE
Problem: PHYSICAL THERAPY ADULT  Goal: Performs mobility at highest level of function for planned discharge setting  See evaluation for individualized goals  Outcome: Progressing  Prognosis: Good  Problem List: Decreased strength, Decreased range of motion, Decreased endurance, Impaired balance, Decreased mobility, Decreased coordination, Decreased safety awareness, Pain  Assessment: Pt will benefit from cont trans, amb and mobility with the RW  Pt with good tolerance to LE ex  Recommendation: Home with family support, Home PT          See flowsheet documentation for full assessment

## 2018-08-14 NOTE — PLAN OF CARE
DISCHARGE PLANNING     Discharge to home or other facility with appropriate resources Progressing        DISCHARGE PLANNING - CARE MANAGEMENT     Discharge to post-acute care or home with appropriate resources Progressing        METABOLIC, FLUID AND ELECTROLYTES - ADULT     Electrolytes maintained within normal limits Progressing        MUSCULOSKELETAL - ADULT     Maintain or return mobility to safest level of function Progressing        PAIN - ADULT     Verbalizes/displays adequate comfort level or baseline comfort level Progressing        Potential for Falls     Patient will remain free of falls Progressing        SAFETY ADULT     Maintain or return to baseline ADL function Progressing     Patient will remain free of falls Progressing

## 2018-08-14 NOTE — PROGRESS NOTES
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  Cortisol is low so adrenal insufficiency will need to be ruled out  · Na worsened to 121 today from 128 yesterday - stop IVF and give 1 dose of Tolvaptan 7 5 mg    · Recommend stopping Trileptal       2  Fall: Possibly related to hyponatremia  3  Low cortisol: Check latanya stim test      DISPOSITION:  · Stop IVF - done by me earlier today  · Tolvaptan 7 5 mg PO x 1    · Recommend stopping Trileptal    · Check latanya stim test    Discussed with Dr Ro Washington  SUBJECTIVE / INTERVAL HISTORY:  No new acute issues  Denies any CP or SOB  OBJECTIVE:  Current Weight: Weight - Scale: 68 5 kg (151 lb 0 2 oz)  Vitals:    08/14/18 0131 08/14/18 0311 08/14/18 0600 08/14/18 0731   BP: (!) 171/73 168/63  153/70   BP Location: Left arm Right arm  Right arm   Pulse: (!) 54 56  60   Resp: 18 20  20   Temp: (!) 97 1 °F (36 2 °C) (!) 97 °F (36 1 °C)  97 5 °F (36 4 °C)   TempSrc: Oral Tympanic  Tympanic   SpO2: 99% 99%  95%   Weight:   68 5 kg (151 lb 0 2 oz)    Height:           Intake/Output Summary (Last 24 hours) at 08/14/18 1314  Last data filed at 08/14/18 1100   Gross per 24 hour   Intake          1567 83 ml   Output             2150 ml   Net          -582 17 ml     General: conscious, cooperative, no distress, forgetful  Chest/Lungs: clear breath sounds, no rales, wheeze, ronchi  CVS: distinct heart sounds, normal rate, regular, no edema  Abdomen: soft, non tender, non distended, normal bowel sounds       Medications:    Current Facility-Administered Medications:     acetaminophen (TYLENOL) tablet 650 mg, 650 mg, Oral, Q6H PRN, Henri Currie DO, 650 mg at 08/14/18 0736    busPIRone (BUSPAR) tablet 5 mg, 5 mg, Oral, BID, Juan C Hart, MD, 5 mg at 08/14/18 0948    heparin (porcine) subcutaneous injection 5,000 Units, 5,000 Units, Subcutaneous, Q8H [address], 5,000 Units at 08/14/18 0514 **AND** [CANCELED] Platelet count, , , Once, Melisa Hardin MD    [facility]ron [facility] injection 4 mg, 4 mg, Intravenous, Q6H PRN, Melisa Hardin MD, 4 mg at 08/14/18 0736    OXcarbazepine (TRILEPTAL) tablet 300 mg, 300 mg, Oral, Q12H [address], Melisa Hardin MD, 300 mg at 08/14/18 0948    tamsulosin (FLOMAX) capsule 0 4 mg, 0 4 mg, Oral, Daily With Madisyn Garcia MD, 0 4 mg at 08/13/18 1533    traZODone (DESYREL) tablet 50 mg, 50 mg, Oral, HS, Melisa Hardin MD, 50 mg at 08/13/18 2123    valACYclovir (VALTREX) tablet 500 mg, 500 mg, Oral, TID, Melisa Hardin MD, 500 mg at 08/14/18 0948    Laboratory Results:    Results from last 7 days  Lab Units 08/14/18  0449 08/13/18  0510 08/13/18  0046 08/12/18  2026   WBC Thousand/uL  --  4 48  --  6 31   HEMOGLOBIN g/dL  --  11 8*  --  10 2*   HEMATOCRIT %  --  36 7  --  31 6*   PLATELETS Thousands/uL  --  194  --  172   SODIUM mmol/L 121* 128* 127* 124*   POTASSIUM mmol/L 3 8 3 9  --  3 8   CHLORIDE mmol/L 90* 94*  --  93*   CO2 mmol/L 24 25  --  28   BUN mg/dL 13 21  --  28*   CREATININE mg/dL 0 63 0 74  --  0 96   CALCIUM mg/dL 8 8 8 6  --  8 5   TOTAL PROTEIN g/dL  --   --   --  5 8*   GLUCOSE RANDOM mg/dL 88 73  --  126     Work up:  8/14/18 Uric acid 2 5, Cortisol 8 5, TSH 1 210  8/13/18 UOsm 462, Maximino 72, SOsm 264

## 2018-08-14 NOTE — PHYSICAL THERAPY NOTE
PT TREATMENT     08/14/18 1446   Pain Assessment   Pain Assessment No/denies pain   Restrictions/Precautions   Other Precautions Fall Risk;Bed Alarm; Chair Alarm   General   Chart Reviewed Yes   Family/Caregiver Present No   Subjective   Subjective "tired"   Transfers   Sit to Stand 4  Minimal assistance   Additional items Assist x 1;Verbal cues   Stand to Sit 4  Minimal assistance   Additional items Assist x 1;Verbal cues   Additional Comments Pt donned slippers prior to amb   Ambulation/Elevation   Gait pattern Shuffling; Short stride;Decreased foot clearance;Narrow JACKIE; Forward Flexion   Gait Assistance 4  Minimal assist   Additional items Assist x 1;Verbal cues; Tactile cues   Assistive Device Rolling walker   Distance 100 feet   Balance   Static Sitting Good   Static Standing Fair   Dynamic Standing Fair   Ambulatory Fair   Exercises   Hip Flexion 10 reps;Bilateral;Sitting   Knee AROM Long Arc Quad 10 reps;Bilateral;Sitting   Ankle Pumps 10 reps;Bilateral;Sitting   Assessment   Assessment Pt will benefit from cont trans, amb and mobility with the RW  Pt with good tolerance to LE ex  Goals   Treatment Day 2   Plan   Treatment/Interventions ADL retraining;Functional transfer training;LE strengthening/ROM; Elevations; Therapeutic exercise; Endurance training;Patient/family training;Bed mobility;Gait training   PT Frequency 5x/wk   Recommendation   Recommendation Home with family support;Home PT   Equipment Recommended (cont amb with RW)   Licensure   [place] License Number  [id number] [place], [place] [id number]

## 2018-08-14 NOTE — PROGRESS NOTES
[address] Internal Medicine Progress Note  Patient: Navin Carlin 74 y o  male   MRN: [id number]  PCP: Daniel Bourgeois MD  Unit/Bed#: [address] [place] [address] A Encounter: [id number]  Date Of Visit: 08/14/18    Problem List:    Principal Problem:    Hyponatremia  Active Problems:    Frequent falls    Chronic headache    Depression    Shingles    Urinary frequency      Assessment & Plan:    * Hyponatremia   Assessment & Plan    Hypo osmolar, likely secondary to SIADH  No improvement with IV fluids  Uric acid level is low   TSH within normal limit  Cortisol level low normal  Possibly secondary to recent addition of Trileptal  Nephrology input appreciated  Dose of tolvaptan today  Will discontinue Trileptal  Monitor              Frequent falls   Assessment & Plan    Falls seem to be more mechanical in nature  Query with contribution from above    Patient denies any syncopal symptoms  CT head is negative for an acute process  Recent B12 within normal limit  Continue PT OT  Fall precaution        Urinary frequency   Assessment & Plan    Continue Flomax  Monitor bladder scan        Shingles   Assessment & Plan    Continue Valtrex        Depression   Assessment & Plan    Continue Buspar        Chronic headache   Assessment & Plan    family report he was recently started on Trileptal    Will discontinue  Follow up with Neurology as outpatient         Chest painresolved as of 8/14/2018   Assessment & Plan    likely secondary to trauma  CXR with no acute rib fracture appreciated  EKG reveals sinus bradycardia  troponin negative  Denies any further pain               VTE Pharmacologic Prophylaxis:   Pharmacologic: Heparin  Mechanical VTE Prophylaxis in Place: No    Patient Centered Rounds: I have performed bedside rounds with nursing staff today  Discussions with Specialists or Other Care Team Provider: Yes    Education and Discussions with Family / Patient:Yes    Time Spent for Care: 45 minutes    More than 50% of total time spent on counseling and coordination of care as described above  Current Length of Stay: 2 day(s)    Current Patient Status: Inpatient     Discharge Plan:  Home when clinically improved    Code Status: Level 1 - Full Code    Certification Statement: The patient will continue to require additional inpatient hospital stay due to Hyponatremia      Subjective:   Noted to have worsening of hyponatremia  Mild headache earlier today , improved with Tylenol  Nurse reported frequent urination  Per our records reviewed, ongoing headache and behavior abnormalities  Currently calm cooperative  Appears to have been started on oxcarbazepine recently    Objective:     Comfortably sitting in chair  Ambulated well with physical therapy    Negative for Chest Pain, Palpitations, Shortness of Breath, Abdominal Pain, Nausea, Vomiting, Constipation, Diarrhea, Dizziness  All other 10 review of systems negative and without drastic interval changes from yesterday  Vitals:   Temp (24hrs), Av 3 °F (36 3 °C), Min:97 °F (36 1 °C), Max:97 7 °F (36 5 °C)    HR:  [54-60] 60  Resp:  [18-20] 20  BP: (153-186)/(63-78) 153/70  SpO2:  [95 %-99 %] 95 %  Body mass index is 21 06 kg/m²  Input and Output Summary (last 24 hours): Intake/Output Summary (Last 24 hours) at 18 1249  Last data filed at 18 1100   Gross per 24 hour   Intake          1567 83 ml   Output             2150 ml   Net          -582 17 ml       Physical Exam:     Physical Exam   Constitutional: He is oriented to person, place, and time  He appears well-developed  No distress  HENT:   Head: Normocephalic and atraumatic  Nose: Nose normal    Mouth/Throat: Oropharynx is clear and moist    Decreased vision and hearing   Eyes: Conjunctivae and EOM are normal  Pupils are equal, round, and reactive to light  Neck: Normal range of motion  Neck supple  No JVD present  Cardiovascular: Normal rate and regular rhythm      Pulmonary/Chest: Effort normal  He has decreased breath sounds  He has no rhonchi  Abdominal: Soft  Bowel sounds are normal    Musculoskeletal: Normal range of motion  He exhibits no edema  Neurological: He is alert and oriented to person, place, and time  No cranial nerve deficit  Nonfocal   Skin: Skin is warm and dry  Psychiatric: He has a normal mood and affect  Additional Data:     Labs:      Results from last 7 days  Lab Units 08/13/18  0510   WBC Thousand/uL 4 48   HEMOGLOBIN g/dL 11 8*   HEMATOCRIT % 36 7   PLATELETS Thousands/uL 194   NEUTROS PCT % 64   LYMPHS PCT % 23   MONOS PCT % 9   EOS PCT % 3       Results from last 7 days  Lab Units 08/14/18  0449  08/12/18 2026   SODIUM mmol/L 121*  < > 124*   POTASSIUM mmol/L 3 8  < > 3 8   CHLORIDE mmol/L 90*  < > 93*   CO2 mmol/L 24  < > 28   BUN mg/dL 13  < > 28*   CREATININE mg/dL 0 63  < > 0 96   CALCIUM mg/dL 8 8  < > 8 5   TOTAL PROTEIN g/dL  --   --  5 8*   BILIRUBIN TOTAL mg/dL  --   --  0 20   ALK PHOS U/L  --   --  67   ALT U/L  --   --  19   AST U/L  --   --  18   GLUCOSE RANDOM mg/dL 88  < > 126   < > = values in this interval not displayed  Results from last 7 days  Lab Units 08/12/18 2026   INR  0 98       * I Have Reviewed All Lab Data Listed Above  * Additional Pertinent Lab Tests Reviewed: [address] Admission Reviewed    Imaging:  X-ray Chest 1 View Portable    Result Date: 8/13/2018  Narrative: CHEST INDICATION:   cp  COMPARISON:  11/28/2016 EXAM PERFORMED/VIEWS:  XR CHEST PORTABLE 1 image FINDINGS: Cardiomediastinal silhouette appears upper limits of normal in size  No evidence of heart failure  The lungs are clear  No pneumothorax or pleural effusion  Osseous structures appear within normal limits for patient age  Impression: No acute cardiopulmonary disease  Workstation performed: [id number]     Ct Head Without Contrast    Result Date: 8/12/2018  Narrative: CT BRAIN - WITHOUT CONTRAST INDICATION:   Multipe falls  COMPARISON:  CT head 9/21/2016 TECHNIQUE:  CT examination of the brain was performed  In addition to axial images, coronal 2D reformatted images were created and submitted for interpretation  Radiation dose length product (DLP) for this visit:  1296 87 mGy-cm   This examination, like all CT scans performed in the [facility], was performed utilizing techniques to minimize radiation dose exposure, including the use of iterative reconstruction and automated exposure control  IMAGE QUALITY:  Diagnostic  FINDINGS: PARENCHYMA:  No intracranial mass, mass effect or midline shift  No CT signs of acute infarction  No acute parenchymal hemorrhage  Age-related cortical atrophy  Periventricular white matter hypodensity which is nonspecific although most compatible with  chronic small vessel ischemic disease  VENTRICLES AND EXTRA-AXIAL SPACES:  Stable  VISUALIZED ORBITS AND PARANASAL SINUSES:  There is heterogeneous soft tissue density within the left maxillary sinus which is completely opacified and mildly expanded with chronic wall thickening  There is central increased density and calcification which could be related to inspissated secretions, colonization with fungal disease cannot be excluded  Soft tissue obscures and expands the ipsilateral ostiomeatal unit  There is opacification of some adjacent ethmoid air cells as well  Bilateral lens implants  The mastoid air cells are clear  CALVARIUM AND EXTRACRANIAL SOFT TISSUES:  There is mild posterior scalp soft tissue swelling  No calvarial fracture  Impression: No acute intracranial abnormality  Mild posterior scalp soft tissue swelling without calvarial fracture  Complete opacification and expansion of the left maxillary sinus with heterogeneous soft tissue demonstrating central increased density and calcification  Findings appear chronic although the expansion has progressed now with some opacification of the left ethmoid air cells    There is no overt bony destruction  Findings favor chronic sinusitis/mucocele although chronic fungal disease could potentially look similar  Nonemergent ENT consultation recommended  Workstation performed: [id number]     Imaging Reports Reviewed by myself    Cultures:   Blood Culture: No results found for: BLOODCX  Urine Culture: No results found for: URINECX  Sputum Culture: No components found for: SPUTUMCX  Wound Culture: No results found for: WOUNDCULT    Last 24 Hours Medication List:     Current Facility-Administered Medications:  acetaminophen 650 mg Oral Q6H PRN Karen Double, DO   busPIRone 5 mg Oral BID Maria Del Carmen Nunez MD   heparin (porcine) 5,000 Units Subcutaneous Q8H [facility] Maria Del Carmen Nunez MD   ondansetron 4 mg Intravenous Q6H PRN Maria Del Carmen Nunez MD   OXcarbazepine 300 mg Oral Q12H [facility] Maria Del Carmen Nunez MD   tamsulosin 0 4 mg Oral Daily With Mag Mckeon MD   traZODone 50 mg Oral HS Maria Del Carmen Nunez MD   valACYclovir 500 mg Oral TID Maria Del Carmen Nunez MD        Today, Patient Was Seen By: Ryan Thompson MD    ** Please Note:  Dictation voice to text software may have been used in the creation of this document   **

## 2018-08-15 ENCOUNTER — APPOINTMENT (INPATIENT)
Dept: RADIOLOGY | Facility: HOSPITAL | Age: 73
DRG: 644 | End: 2018-08-15
Payer: COMMERCIAL

## 2018-08-15 LAB
ANION GAP SERPL CALCULATED.3IONS-SCNC: 6 MMOL/L (ref 4–13)
BASOPHILS # BLD AUTO: 0.02 THOUSANDS/ΜL (ref 0–0.1)
BASOPHILS NFR BLD AUTO: 0 % (ref 0–1)
BUN SERPL-MCNC: 11 MG/DL (ref 5–25)
CALCIUM SERPL-MCNC: 9.1 MG/DL (ref 8.3–10.1)
CHLORIDE SERPL-SCNC: 98 MMOL/L (ref 100–108)
CO2 SERPL-SCNC: 26 MMOL/L (ref 21–32)
CREAT SERPL-MCNC: 1.01 MG/DL (ref 0.6–1.3)
EOSINOPHIL # BLD AUTO: 0.02 THOUSAND/ΜL (ref 0–0.61)
EOSINOPHIL NFR BLD AUTO: 0 % (ref 0–6)
ERYTHROCYTE [DISTWIDTH] IN BLOOD BY AUTOMATED COUNT: 14.6 % (ref 11.6–15.1)
GFR SERPL CREATININE-BSD FRML MDRD: 72 ML/MIN/1.73SQ M
GLUCOSE SERPL-MCNC: 118 MG/DL (ref 65–140)
GLUCOSE SERPL-MCNC: 95 MG/DL (ref 65–140)
HCT VFR BLD AUTO: 37.1 % (ref 36.5–49.3)
HGB BLD-MCNC: 12 G/DL (ref 12–17)
IMM GRANULOCYTES # BLD AUTO: 0.03 THOUSAND/UL (ref 0–0.2)
IMM GRANULOCYTES NFR BLD AUTO: 0 % (ref 0–2)
LYMPHOCYTES # BLD AUTO: 0.75 THOUSANDS/ΜL (ref 0.6–4.47)
LYMPHOCYTES NFR BLD AUTO: 10 % (ref 14–44)
MCH RBC QN AUTO: 26.8 PG (ref 26.8–34.3)
MCHC RBC AUTO-ENTMCNC: 32.3 G/DL (ref 31.4–37.4)
MCV RBC AUTO: 83 FL (ref 82–98)
MONOCYTES # BLD AUTO: 0.65 THOUSAND/ΜL (ref 0.17–1.22)
MONOCYTES NFR BLD AUTO: 9 % (ref 4–12)
NEUTROPHILS # BLD AUTO: 6.1 THOUSANDS/ΜL (ref 1.85–7.62)
NEUTS SEG NFR BLD AUTO: 81 % (ref 43–75)
NRBC BLD AUTO-RTO: 0 /100 WBCS
PLATELET # BLD AUTO: 217 THOUSANDS/UL (ref 149–390)
PMV BLD AUTO: 10.2 FL (ref 8.9–12.7)
POTASSIUM SERPL-SCNC: 3.8 MMOL/L (ref 3.5–5.3)
RBC # BLD AUTO: 4.48 MILLION/UL (ref 3.88–5.62)
SODIUM SERPL-SCNC: 130 MMOL/L (ref 136–145)
WBC # BLD AUTO: 7.57 THOUSAND/UL (ref 4.31–10.16)

## 2018-08-15 PROCEDURE — 97535 SELF CARE MNGMENT TRAINING: CPT

## 2018-08-15 PROCEDURE — 99232 SBSQ HOSP IP/OBS MODERATE 35: CPT | Performed by: INTERNAL MEDICINE

## 2018-08-15 PROCEDURE — 82948 REAGENT STRIP/BLOOD GLUCOSE: CPT

## 2018-08-15 PROCEDURE — 99233 SBSQ HOSP IP/OBS HIGH 50: CPT | Performed by: INTERNAL MEDICINE

## 2018-08-15 PROCEDURE — 73502 X-RAY EXAM HIP UNI 2-3 VIEWS: CPT

## 2018-08-15 PROCEDURE — 80048 BASIC METABOLIC PNL TOTAL CA: CPT | Performed by: INTERNAL MEDICINE

## 2018-08-15 PROCEDURE — 85025 COMPLETE CBC W/AUTO DIFF WBC: CPT | Performed by: INTERNAL MEDICINE

## 2018-08-15 PROCEDURE — 97110 THERAPEUTIC EXERCISES: CPT

## 2018-08-15 PROCEDURE — 71101 X-RAY EXAM UNILAT RIBS/CHEST: CPT

## 2018-08-15 RX ORDER — BRIMONIDINE TARTRATE, TIMOLOL MALEATE 2; 5 MG/ML; MG/ML
1 SOLUTION/ DROPS OPHTHALMIC 2 TIMES DAILY
Status: DISCONTINUED | OUTPATIENT
Start: 2018-08-16 | End: 2018-08-16 | Stop reason: CLARIF

## 2018-08-15 RX ORDER — TOLVAPTAN 15 MG/1
7.5 TABLET ORAL ONCE
Status: COMPLETED | OUTPATIENT
Start: 2018-08-15 | End: 2018-08-15

## 2018-08-15 RX ADMIN — VALACYCLOVIR 500 MG: 500 TABLET, FILM COATED ORAL at 21:56

## 2018-08-15 RX ADMIN — TAMSULOSIN HYDROCHLORIDE 0.4 MG: 0.4 CAPSULE ORAL at 16:09

## 2018-08-15 RX ADMIN — HEPARIN SODIUM 5000 UNITS: 5000 INJECTION, SOLUTION INTRAVENOUS; SUBCUTANEOUS at 06:20

## 2018-08-15 RX ADMIN — ONDANSETRON 4 MG: 2 INJECTION INTRAMUSCULAR; INTRAVENOUS at 04:17

## 2018-08-15 RX ADMIN — TRAZODONE HYDROCHLORIDE 50 MG: 50 TABLET ORAL at 21:55

## 2018-08-15 RX ADMIN — VALACYCLOVIR 500 MG: 500 TABLET, FILM COATED ORAL at 09:26

## 2018-08-15 RX ADMIN — HEPARIN SODIUM 5000 UNITS: 5000 INJECTION, SOLUTION INTRAVENOUS; SUBCUTANEOUS at 13:55

## 2018-08-15 RX ADMIN — BUSPIRONE HYDROCHLORIDE 5 MG: 5 TABLET ORAL at 17:05

## 2018-08-15 RX ADMIN — TOLVAPTAN 7.5 MG: 15 TABLET ORAL at 10:33

## 2018-08-15 RX ADMIN — BIMATOPROST 1 DROP: 0.1 SOLUTION/ DROPS OPHTHALMIC at 21:55

## 2018-08-15 RX ADMIN — HEPARIN SODIUM 5000 UNITS: 5000 INJECTION, SOLUTION INTRAVENOUS; SUBCUTANEOUS at 21:56

## 2018-08-15 RX ADMIN — OXCARBAZEPINE 300 MG: 150 TABLET ORAL at 09:26

## 2018-08-15 RX ADMIN — VALACYCLOVIR 500 MG: 500 TABLET, FILM COATED ORAL at 16:09

## 2018-08-15 RX ADMIN — ACETAMINOPHEN 650 MG: 325 TABLET, FILM COATED ORAL at 04:15

## 2018-08-15 RX ADMIN — BUSPIRONE HYDROCHLORIDE 5 MG: 5 TABLET ORAL at 09:26

## 2018-08-15 NOTE — PROGRESS NOTES
Assessment/Plan:     Diagnoses and all orders for this visit:    Herpes zoster without complication  Comments:  start valtrex and rx lidocaine patch 5% for pain (if not covered may ust otc 4% patch)  follow-up in 1-2 weeks w update on condition, patricia prn  Orders:  -     valACYclovir (VALTREX) 500 mg tablet; Take 1 tablet (500 mg total) by mouth 3 (three) times a day for 7 days  -     lidocaine (LIDODERM) 5 %; Place 1 patch on the skin daily Remove & Discard patch within 12 hours or as directed by MD    Chronic nonintractable headache, unspecified headache type  Comments:  await further recommendations from neurologist-appt upcoming  Other orders  -     OXcarbazepine (TRILEPTAL) 300 mg tablet; Take 300 mg by mouth every 12 (twelve) hours    -     tamsulosin (FLOMAX) 0 4 mg; Take 0 4 mg by mouth              Subjective:      Patient ID: Chetan Arellano is a 74 y o  male  Chief Complaint   Patient presents with   [address]     on left arm and rt  stomach and hip       HPI    The following portions of the patient's history were reviewed and updated as appropriate: allergies, current medications, past family history, past medical history, past social history, past surgical history and problem list      Review of Systems   Constitutional: Positive for fatigue  Negative for fever  Respiratory: Negative  Cardiovascular: Negative  Musculoskeletal: Positive for arthralgias and myalgias  Skin: Positive for rash  Neurological: Positive for headaches  Psychiatric/Behavioral: Positive for decreased concentration and dysphoric mood  The patient is nervous/anxious  Objective:    /60 (BP Location: Left arm, Patient Position: Sitting, Cuff Size: Standard)   Pulse 72   Temp 98 2 °F (36 8 °C) (Temporal)   Resp 14   Wt 68 5 kg (151 lb)   BMI 21 67 kg/m²        Physical Exam   Constitutional: He is oriented to person, place, and time  He appears well-developed and well-nourished  Cardiovascular: Normal rate and regular rhythm  Murmur heard  Pulmonary/Chest: Effort normal and breath sounds normal    Abdominal: Soft  Bowel sounds are normal  There is no tenderness  Neurological: He is alert and oriented to person, place, and time  No cranial nerve deficit  Skin: Skin is warm and dry  Rash noted  Few clumps of vesicular,papular lesions along line to lower right abd to right hip area   Psychiatric:   anxious   Nursing note and vitals reviewed        Chris Stringer MD

## 2018-08-15 NOTE — PROGRESS NOTES
[address] [facility] PROGRESS NOTE   Colby Saleem 74 y o  male MRN: [id number]  Unit/Bed#: [address] A Encounter: [id number]  Reason for Consult: Hyponatremia    ASSESSMENT and PLAN:  1  Hypo-osmolar hyponatremia  · 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 which has been stopped  · No evidence of hypothyroidism or adrenal insufficiency  · Sodium improved to 130 with Tolvaptan 7 5 mg   · Will redose Tolvaptan 7 5 mg today  2  Fall: Possibly related to hyponatremia  3  Low cortisol: Cosyntropin stim test is negative  DISPOSITION:  · Tolvaptan 7 5 mg PO x 1 today  Discussed with Dr Cody Gutiérrez  SUBJECTIVE / INTERVAL HISTORY:  Had a fall last night  Denies CP or SOB  OBJECTIVE:  Current Weight: Weight - Scale: 67 6 kg (149 lb 0 5 oz)  Vitals:    08/14/18 1345 08/14/18 2046 08/15/18 0300 08/15/18 0600   BP: 120/57 152/69 103/62    BP Location: Right arm Right arm Right arm    Pulse: 60 59 75    Resp: 16 18 18    Temp: (!) 97 4 °F (36 3 °C) 98 2 °F (36 8 °C) 97 8 °F (36 6 °C)    TempSrc: Oral Oral Oral    SpO2: 99% 97% 94%    Weight:    67 6 kg (149 lb 0 5 oz)   Height:           Intake/Output Summary (Last 24 hours) at 08/15/18 0913  Last data filed at 08/15/18 9507   Gross per 24 hour   Intake                0 ml   Output             1200 ml   Net            -1200 ml     General: conscious, cooperative, no distress  Chest/Lungs: clear breath sounds  CVS: distinct heart sounds, normal rate  Abdomen: soft  Extremities: no edema       Medications:    Current Facility-Administered Medications:     acetaminophen (TYLENOL) tablet 650 mg, 650 mg, Oral, Q6H PRN, Henri Currie DO, 650 mg at 08/15/18 0415    busPIRone (BUSPAR) tablet 5 mg, 5 mg, Oral, BID, Mary Beth Artis MD, 5 mg at 08/14/18 1720    heparin (porcine) subcutaneous injection 5,000 Units, 5,000 Units, Subcutaneous, Q8H [address], 5,000 Units at 08/15/18 0620 **AND** [CANCELED] Platelet count, , , Once, Mary Beth Artis, MD    ondansetron (ZOFRAN) injection 4 mg, 4 mg, Intravenous, Q6H PRN, Fang Gant MD, 4 mg at 08/15/18 0417    OXcarbazepine (TRILEPTAL) tablet 300 mg, 300 mg, Oral, Q12H [facility], Maple MD Margaret, 300 mg at 08/14/18 2136    tamsulosin (FLOMAX) capsule 0 4 mg, 0 4 mg, Oral, Daily With Brent Rowe MD, 0 4 mg at 08/14/18 1530    traZODone (DESYREL) tablet 50 mg, 50 mg, Oral, HS, aFng Gant MD, 50 mg at 08/14/18 2136    valACYclovir (VALTREX) tablet 500 mg, 500 mg, Oral, TID, Fang Gant MD, 500 mg at 08/14/18 2136    Laboratory Results:    Results from last 7 days  Lab Units 08/15/18  0552 08/14/18  0449 08/13/18  0510 08/13/18  0046 08/12/18 2026   WBC Thousand/uL 7 57  --  4 48  --  6 31   HEMOGLOBIN g/dL 12 0  --  11 8*  --  10 2*   HEMATOCRIT % 37 1  --  36 7  --  31 6*   PLATELETS Thousands/uL 217  --  194  --  172   SODIUM mmol/L 130* 121* 128* 127* 124*   POTASSIUM mmol/L 3 8 3 8 3 9  --  3 8   CHLORIDE mmol/L 98* 90* 94*  --  93*   CO2 mmol/L 26 24 25  --  28   BUN mg/dL 11 13 21  --  28*   CREATININE mg/dL 1 01 0 63 0 74  --  0 96   CALCIUM mg/dL 9 1 8 8 8 6  --  8 5   TOTAL PROTEIN g/dL  --   --   --   --  5 8*   GLUCOSE RANDOM mg/dL 95 88 73  --  126     Work up:  8/14/18 Uric acid 2 5, Cortisol 8 5, TSH 1 210, Tawanda stim 31 8 -> 38 0  8/13/18 UOsm 462, [address], SOsm 264

## 2018-08-15 NOTE — PROGRESS NOTES
Was called to examine patient after reported fall  Staff reports patient has history of falls  Patient was found by nursing staff leaning on wall  He arose searching for cane and attempting to urinate using urinal   Floor was found to be wet which possibly could have led to a mechanical fall  Patient is pleasant, alert and oriented x2 on exam, in no apparent distress  Patient admits to lightheadedness  He reports he did hit the left lateral aspect of his head during event  Patient states he feels "fine"  Head examined and atraumatic  Eyes PERRLA  Heart normal rate and regular rhythm  In no respiratory distress and lungs were clear to auscultation bilaterally  5/5 muscle strength bilateral upper and lower extremities  Upon examination, no new lesions or new contusions observed  Patient is on fall precautions

## 2018-08-15 NOTE — PHYSICAL THERAPY NOTE
PT TREATMENT     08/15/18 1403   Pain Assessment   Pain Assessment No/denies pain   Restrictions/Precautions   Other Precautions Fall Risk;Bed Alarm; Chair Alarm   General   Chart Reviewed Yes   Cognition   Overall Cognitive Status WFL   Subjective   Subjective "I'd love to exercise"   Transfers   Sit to Stand 5  Supervision   Stand to Sit 5  Supervision   Stand pivot 5  Supervision   Ambulation/Elevation   Gait Assistance (supervision with walker, supervision/min assist with cane)   Distance 200 feet each   Stair Management Assistance 5  Supervision   Stair Management Technique Two rails   Number of Stairs 4   Balance   Static Sitting Good   Dynamic Sitting Good   Static Standing Fair   Dynamic Standing Fair   Activity Tolerance   Activity Tolerance Patient tolerated treatment well   Exercises   Hip Flexion 10 reps   Knee AROM Long Arc Quad 10 reps   Ankle Pumps 20 reps   Squat 10 reps  (min assist)   Balance training  x 8 mintues; head turns up/down, side/side, external perturbations, step forward and back each leg   Assessment   Prognosis Good   Problem List Decreased strength;Decreased endurance; Impaired balance;Decreased mobility   Assessment Pt demonstrates improving functional mobility and balance  Recommend pt use the walker with proper footwear  Plan   Treatment/Interventions ADL retraining;Functional transfer training;LE strengthening/ROM; Therapeutic exercise; Bed mobility; Equipment eval/education;Patient/family training;Elevations   Progress Progressing toward goals   PT Frequency 5x/wk   Recommendation   Recommendation 24 hour supervision/assist  (home PT vs balance center)   Equipment Recommended (continue ambulation with walker vs cane)   Licensure   [place] License Number  Neo Rogers PT  [id number]

## 2018-08-15 NOTE — ASSESSMENT & PLAN NOTE
Falls, multifactorial   Likely with contribution from hyponatremia and orthostasis with gait dysfunction  Patient denies any syncopal symptoms, but reports dizziness  CT head is negative for an acute process      Recent B12 within normal limit  Monitor orthostasis, Flomax was discontinued  Sodium is better  Continue PT OT  Fall precaution, use walker all the time with ambulation  Educated to use Rodolfo stockings while ambulatory  Strict orthostatic precaution explained  Discussed with patient and wife, declines rehab  Will arrange [facility] for PT/OT

## 2018-08-15 NOTE — PLAN OF CARE
Problem: PHYSICAL THERAPY ADULT  Goal: Performs mobility at highest level of function for planned discharge setting  See evaluation for individualized goals  Outcome: Progressing  Prognosis: Good  Problem List: Decreased strength, Decreased endurance, Impaired balance, Decreased mobility  Assessment: Pt demonstrates improving functional mobility and balance  Recommend pt use the walker with proper footwear  Recommendation: 24 hour supervision/assist (home PT vs balance center)          See flowsheet documentation for full assessment

## 2018-08-15 NOTE — PROGRESS NOTES
[address] Internal Medicine Progress Note  Patient: Jair Mckay 74 y o  male   MRN: [id number]  PCP: Jorge Davis MD  Unit/Bed#: [address] [place] [address] Encounter: [id number]  Date Of Visit: 08/15/18    Problem List:    Principal Problem:    Hyponatremia  Active Problems:    Frequent falls    Chronic headache    Depression    Shingles    Urinary frequency      Assessment & Plan:    * Hyponatremia   Assessment & Plan    Hypo osmolar, likely secondary to SIADH  No improvement with IV fluids  Uric acid level is low   TSH within normal limit  Cortisol level low normal  Possibly secondary to recent addition of Trileptal  Nephrology input appreciated  Dose of tolvaptan today  Will discontinue Trileptal  Monitor              Frequent falls   Assessment & Plan    Falls seem to be more mechanical in nature  Query with contribution from above    Patient denies any syncopal symptoms  CT head is negative for an acute process  Recent B12 within normal limit  Continue PT OT  Fall precaution        Urinary frequency   Assessment & Plan    Continue Flomax  Monitor bladder scan        Shingles   Assessment & Plan    Continue Valtrex        Depression   Assessment & Plan    Continue Buspar        Chronic headache   Assessment & Plan    family report he was recently started on Trileptal    Will discontinue  Follow up with Neurology as outpatient         Chest painresolved as of 8/14/2018   Assessment & Plan    likely secondary to trauma  CXR with no acute rib fracture appreciated  EKG reveals sinus bradycardia  troponin negative  Denies any further pain               VTE Pharmacologic Prophylaxis:   Pharmacologic: Heparin  Mechanical VTE Prophylaxis in Place: No    Patient Centered Rounds: I have performed bedside rounds with nursing staff today      Discussions with Specialists or Other Care Team Provider: Yes, Dr Yojana Bai left for Dr Mariam Abdul    Education and Discussions with Family / Patient:Yes discussed with patient's wife over the phone    Time Spent for Care: 45 minutes  More than 50% of total time spent on counseling and coordination of care as described above  Current Length of Stay: 3 day(s)    Current Patient Status: Inpatient     Discharge Plan:  Home when clinically improved    Code Status: Level 1 - Full Code    Certification Statement: The patient will continue to require additional inpatient hospital stay due to Hyponatremia      Subjective:   Had fall last night while attempting to get up and urinal  Patient denies loss of consciousness or syncope  Reports mild pain over left hip and left chest wall  Denies headache or dizziness  Overall feels much better, does not feel confused anymore          Objective:     Comfortably sitting in chair    Negative for Chest Pain, Palpitations, Shortness of Breath, Abdominal Pain, Nausea, Vomiting, Constipation, Diarrhea, Dizziness  All other 10 review of systems negative and without drastic interval changes from yesterday  Vitals:   Temp (24hrs), Av 7 °F (36 5 °C), Min:97 4 °F (36 3 °C), Max:98 2 °F (36 8 °C)    HR:  [59-75] 70  Resp:  [16-18] 18  BP: ()/(56-69) 97/56  SpO2:  [94 %-99 %] 97 %  Body mass index is 20 79 kg/m²  Input and Output Summary (last 24 hours): Intake/Output Summary (Last 24 hours) at 08/15/18 0934  Last data filed at 08/15/18 2393   Gross per 24 hour   Intake                0 ml   Output             1200 ml   Net            -1200 ml       Physical Exam:     Physical Exam   Constitutional: He is oriented to person, place, and time  He appears well-developed  No distress  HENT:   Head: Normocephalic and atraumatic  Nose: Nose normal    Mouth/Throat: Oropharynx is clear and moist    Decreased vision and hearing   Eyes: Conjunctivae and EOM are normal  Pupils are equal, round, and reactive to light  Neck: Normal range of motion  Neck supple  No JVD present  Cardiovascular: Normal rate and regular rhythm  Pulmonary/Chest: Effort normal  He has decreased breath sounds  He has no rhonchi  Mild tenderness over left lateral chest wall  Abdominal: Soft  Bowel sounds are normal    Musculoskeletal: Normal range of motion  He exhibits no edema  Mild tenderness over left hip laterally  Neurological: He is alert and oriented to person, place, and time  No cranial nerve deficit  Nonfocal   Skin: Skin is warm and dry  Psychiatric: He has a normal mood and affect         Additional Data:     Labs:      Results from last 7 days  Lab Units 08/15/18  0552   WBC Thousand/uL 7 57   HEMOGLOBIN g/dL 12 0   HEMATOCRIT % 37 1   PLATELETS Thousands/uL 217   NEUTROS PCT % 81*   LYMPHS PCT % 10*   MONOS PCT % 9   EOS PCT % 0       Results from last 7 days  Lab Units 08/15/18  0552  08/12/18 2026   SODIUM mmol/L 130*  < > 124*   POTASSIUM mmol/L 3 8  < > 3 8   CHLORIDE mmol/L 98*  < > 93*   CO2 mmol/L 26  < > 28   BUN mg/dL 11  < > 28*   CREATININE mg/dL 1 01  < > 0 96   CALCIUM mg/dL 9 1  < > 8 5   TOTAL PROTEIN g/dL  --   --  5 8*   BILIRUBIN TOTAL mg/dL  --   --  0 20   ALK PHOS U/L  --   --  67   ALT U/L  --   --  19   AST U/L  --   --  18   GLUCOSE RANDOM mg/dL 95  < > 126   < > = values in this interval not displayed  Results from last 7 days  Lab Units 08/12/18 2026   INR  0 98       * I Have Reviewed All Lab Data Listed Above  * Additional Pertinent Lab Tests Reviewed: [place] [address] Admission Reviewed    Imaging:  X-ray Chest 1 View Portable    Result Date: 8/13/2018  Narrative: CHEST INDICATION:   cp  COMPARISON:  11/28/2016 EXAM PERFORMED/VIEWS:  XR CHEST PORTABLE 1 image FINDINGS: Cardiomediastinal silhouette appears upper limits of normal in size  No evidence of heart failure  The lungs are clear  No pneumothorax or pleural effusion  Osseous structures appear within normal limits for patient age  Impression: No acute cardiopulmonary disease   Workstation performed: [id number]     Ct Head Without Contrast    Result Date: 8/12/2018  Narrative: CT BRAIN - WITHOUT CONTRAST INDICATION:   Multipe falls  COMPARISON:  CT head 9/21/2016 TECHNIQUE:  CT examination of the brain was performed  In addition to axial images, coronal 2D reformatted images were created and submitted for interpretation  Radiation dose length product (DLP) for this visit:  1296 87 mGy-cm   This examination, like all CT scans performed in the [facility], was performed utilizing techniques to minimize radiation dose exposure, including the use of iterative reconstruction and automated exposure control  IMAGE QUALITY:  Diagnostic  FINDINGS: PARENCHYMA:  No intracranial mass, mass effect or midline shift  No CT signs of acute infarction  No acute parenchymal hemorrhage  Age-related cortical atrophy  Periventricular white matter hypodensity which is nonspecific although most compatible with  chronic small vessel ischemic disease  VENTRICLES AND EXTRA-AXIAL SPACES:  Stable  VISUALIZED ORBITS AND PARANASAL SINUSES:  There is heterogeneous soft tissue density within the left maxillary sinus which is completely opacified and mildly expanded with chronic wall thickening  There is central increased density and calcification which could be related to inspissated secretions, colonization with fungal disease cannot be excluded  Soft tissue obscures and expands the ipsilateral ostiomeatal unit  There is opacification of some adjacent ethmoid air cells as well  Bilateral lens implants  The mastoid air cells are clear  CALVARIUM AND EXTRACRANIAL SOFT TISSUES:  There is mild posterior scalp soft tissue swelling  No calvarial fracture  Impression: No acute intracranial abnormality  Mild posterior scalp soft tissue swelling without calvarial fracture  Complete opacification and expansion of the left maxillary sinus with heterogeneous soft tissue demonstrating central increased density and calcification    Findings appear chronic although the expansion has progressed now with some opacification of the left ethmoid air cells  There is no overt bony destruction  Findings favor chronic sinusitis/mucocele although chronic fungal disease could potentially look similar  Nonemergent ENT consultation recommended  Workstation performed: [id number]     Imaging Reports Reviewed by myself    Cultures:   Blood Culture: No results found for: BLOODCX  Urine Culture: No results found for: URINECX  Sputum Culture: No components found for: SPUTUMCX  Wound Culture: No results found for: WOUNDCULT    Last 24 Hours Medication List:     Current Facility-Administered Medications:  acetaminophen 650 mg Oral Q6H PRN Kamla Rojas DO   busPIRone 5 mg Oral BID Tarah Hernández MD   heparin (porcine) 5,000 Units Subcutaneous Q8H [facility] Tarah Hernández MD   ondansetron 4 mg Intravenous Q6H PRN Tarah Hernández MD   OXcarbazepine 300 mg Oral Q12H [facility] Dominick Pascual MD   tamsulosin 0 4 mg Oral Daily With Radha Lee MD   tolvaptan 7 5 mg Oral Once Steve Zarate MD   traZODone 50 mg Oral HS Tarah Hernández MD   valACYclovir 500 mg Oral TID Tarah Hernández MD        Today, Patient Was Seen By: Dominick Pascual MD    ** Please Note:  Dictation voice to text software may have been used in the creation of this document   **

## 2018-08-15 NOTE — ASSESSMENT & PLAN NOTE
likely secondary to trauma  CXR with no acute rib fracture appreciated, but rib series reveals 7th rib fracture    EKG reveals sinus bradycardia  troponin negative     Denies any further pain

## 2018-08-15 NOTE — CASE MANAGEMENT
Continued Stay Review    Date: 8/15/18     Vital Signs: BP 97/56 (BP Location: Right arm)   Pulse 70   Temp (!) 97 4 °F (36 3 °C) (Oral)   Resp 18   Ht 5' 11" (1 803 m)   Wt 67 6 kg (149 lb 0 5 oz)   SpO2 97%   BMI 20 79 kg/m²       XRAY LEFT RIBS PA    XRAY LEFT HIP  BMP  Medications:   Scheduled Meds:   Current Facility-Administered Medications:  acetaminophen 650 mg Oral Q6H PRN Henri Currie DO   busPIRone 5 mg Oral BID Bharti Osuna MD   heparin (porcine) 5,000 Units Subcutaneous Q8H [facility] Bharti Osuna MD   ondansetron 4 mg Intravenous Q6H PRN Bharti Osuna MD   OXcarbazepine 300 mg Oral Q12H Maddie Swift MD   tamsulosin 0 4 mg Oral Daily With Elizabet Falcon MD   tolvaptan 7 5 mg Oral Once Daphney Arnold MD   traZODone 50 mg Oral HS Bharti Osuna MD   valACYclovir 500 mg Oral TID Bharti Osuna MD     Continuous Infusions:    PRN Meds:   acetaminophen    ondansetron    Abnormal Labs/Diagnostic Results:  CL 98   XRAY RIBS   Fracture of 7th left posterior lateral rib  2   No pneumothorax  XRAY HIP LEFTNo acute osseous abnormality  Degenerative changes as described           Age/Sex: 74 y o  male     NEPHROLOGY  ASSESSMENT and PLAN:  1  Hypo-osmolar hyponatremia  · 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 which has been stopped  · No evidence of hypothyroidism or adrenal insufficiency  · Sodium improved to 130 with Tolvaptan 7 5 mg   · Will redose Tolvaptan 7 5 mg today       2  Fall: Possibly related to hyponatremia  3  Low cortisol: Cosyntropin stim test is negative  DISPOSITION:  · Tolvaptan 7 5 mg PO x 1 today       ATTENDING    Hyponatremia   Assessment & Plan     Hypo osmolar, likely secondary to SIADH  No improvement with IV fluids  Uric acid level is low     TSH within normal limit  Cortisol level low normal  Possibly secondary to recent addition of Trileptal  Nephrology input appreciated  Dose of tolvaptan today  Will discontinue Trileptal  Monitor     Urinary frequency   Assessment & Plan     Continue Flomax  Monitor bladder scan   Shingles   Assessment & Plan     Continue Valtrex   Certification Statement: The patient will continue to require additional inpatient hospital stay due to Hyponatremia

## 2018-08-15 NOTE — ASSESSMENT & PLAN NOTE
Hypo osmolar, secondary to SIADH related Trileptal  No improvement with IV fluids  Uric acid level is low   TSH within normal limit  Cortisol level low normal, cosyntropin test without evidence of adrenal insufficiency  Likely secondary to recent addition of Trileptal  Nephrology input appreciated  Status post tolvaptan on 08/14 and 8/15 with improvement in sodium  Trileptal was started last week for headache before initiation of current symptoms  No history of seizure   Discussed with Dr Theodore Ferguson, ivania to discontinue Trileptal after decreasing to 150 b i d  for 2 days  Trileptal was tapered off   Follow up with neurologist after discharge  Sodium normalized, patient was started on Florinef due to orthostatic hypertension which also will with hyponatremia  BMP in 1 week, follow up with Nephrology

## 2018-08-15 NOTE — OCCUPATIONAL THERAPY NOTE
OT TREATMENT       08/15/18 1137   Restrictions/Precautions   Other Precautions Chair Alarm; Bed Alarm; Fall Risk   Pain Assessment   Pain Assessment 0-10   Pain Score 4   Pain Type Acute pain   Pain Location Hip   Pain Orientation Left   ADL   LB Dressing Assistance 5  Supervision/Setup   LB Dressing Deficit Setup;Steadying; Requires assistive device for steadying;Supervision/safety   Bed Mobility   Sit to Supine 5  Supervision   Transfers   Sit to Stand 5  Supervision   Stand to Sit 5  Supervision   Stand pivot 5  Supervision   Functional Mobility   Functional Mobility 5  Supervision   Additional Comments RW level 300' in hallway  no LOB/no SOB  Slippers slightly too big for pt  Difficult to assess if shuffling gait is due to slippers or pt has a shuffling gait  Cognition   Overall Cognitive Status WFL   Arousal/Participation Alert; Responsive; Cooperative   Attention Within functional limits   Activity Tolerance   Activity Tolerance Patient tolerated treatment well   Assessment   Assessment Pt sustained a fall overnight  no injury, fell while trying to use urinal  Pt is a fall risk due to slightly impulsive behaviors (high level of independence at home) and decreased safety (noted to push walker away before getting all the way to his destination)  Pt requires continued education for safety and to minimize risk for additional falls  Plan   Treatment Interventions ADL retraining;Functional transfer training; Endurance training;Patient/family training;Equipment evaluation/education; Compensatory technique education; Energy conservation   Goal Expiration Date 08/26/18   Treatment Day (1)   OT Frequency 3-5x/wk   Recommendation   OT Discharge Recommendation Home with family support  (and services)   Licensure   [place] License Number  [place] MCNULTY/IDA [id number]

## 2018-08-15 NOTE — ASSESSMENT & PLAN NOTE
Patient was recently started on Flomax in last 2 weeks due to urinary incontinence and frequency  Discussed with Dr Inna Reyes, urology  In view of significant orthostasis and falls will discontinue Flomax    Bladder scan acceptable  Monitor orthostasis  Follow up with Urology as outpatient as scheduled next week  Discussed with patient and wife, advised to call for earlier appointment if significant worsening of symptoms

## 2018-08-16 ENCOUNTER — TELEPHONE (OUTPATIENT)
Dept: FAMILY MEDICINE CLINIC | Facility: CLINIC | Age: 73
End: 2018-08-16

## 2018-08-16 PROBLEM — I95.1 ORTHOSTASIS: Status: ACTIVE | Noted: 2018-08-16

## 2018-08-16 LAB
ANION GAP SERPL CALCULATED.3IONS-SCNC: 8 MMOL/L (ref 4–13)
BUN SERPL-MCNC: 14 MG/DL (ref 5–25)
CALCIUM SERPL-MCNC: 9.3 MG/DL (ref 8.3–10.1)
CHLORIDE SERPL-SCNC: 100 MMOL/L (ref 100–108)
CO2 SERPL-SCNC: 28 MMOL/L (ref 21–32)
CREAT SERPL-MCNC: 1.05 MG/DL (ref 0.6–1.3)
GFR SERPL CREATININE-BSD FRML MDRD: 69 ML/MIN/1.73SQ M
GLUCOSE SERPL-MCNC: 82 MG/DL (ref 65–140)
POTASSIUM SERPL-SCNC: 4 MMOL/L (ref 3.5–5.3)
SODIUM SERPL-SCNC: 136 MMOL/L (ref 136–145)

## 2018-08-16 PROCEDURE — 99233 SBSQ HOSP IP/OBS HIGH 50: CPT | Performed by: INTERNAL MEDICINE

## 2018-08-16 PROCEDURE — 80048 BASIC METABOLIC PNL TOTAL CA: CPT | Performed by: INTERNAL MEDICINE

## 2018-08-16 RX ORDER — OXCARBAZEPINE 150 MG/1
150 TABLET, FILM COATED ORAL EVERY 12 HOURS SCHEDULED
Status: DISCONTINUED | OUTPATIENT
Start: 2018-08-16 | End: 2018-08-16

## 2018-08-16 RX ORDER — OXCARBAZEPINE 150 MG/1
150 TABLET, FILM COATED ORAL EVERY 12 HOURS SCHEDULED
Status: DISCONTINUED | OUTPATIENT
Start: 2018-08-16 | End: 2018-08-17 | Stop reason: HOSPADM

## 2018-08-16 RX ORDER — BRIMONIDINE TARTRATE 0.15 %
1 DROPS OPHTHALMIC (EYE) 2 TIMES DAILY
Status: DISCONTINUED | OUTPATIENT
Start: 2018-08-16 | End: 2018-08-17 | Stop reason: HOSPADM

## 2018-08-16 RX ORDER — SODIUM CHLORIDE 1000 MG
1 TABLET, SOLUBLE MISCELLANEOUS EVERY 6 HOURS
Status: COMPLETED | OUTPATIENT
Start: 2018-08-16 | End: 2018-08-16

## 2018-08-16 RX ORDER — TIMOLOL MALEATE 5 MG/ML
1 SOLUTION/ DROPS OPHTHALMIC 2 TIMES DAILY
Status: DISCONTINUED | OUTPATIENT
Start: 2018-08-16 | End: 2018-08-17 | Stop reason: HOSPADM

## 2018-08-16 RX ADMIN — ACETAMINOPHEN 650 MG: 325 TABLET, FILM COATED ORAL at 19:31

## 2018-08-16 RX ADMIN — HEPARIN SODIUM 5000 UNITS: 5000 INJECTION, SOLUTION INTRAVENOUS; SUBCUTANEOUS at 22:16

## 2018-08-16 RX ADMIN — OXCARBAZEPINE 150 MG: 150 TABLET ORAL at 15:06

## 2018-08-16 RX ADMIN — BUSPIRONE HYDROCHLORIDE 5 MG: 5 TABLET ORAL at 09:48

## 2018-08-16 RX ADMIN — BUSPIRONE HYDROCHLORIDE 5 MG: 5 TABLET ORAL at 17:54

## 2018-08-16 RX ADMIN — HEPARIN SODIUM 5000 UNITS: 5000 INJECTION, SOLUTION INTRAVENOUS; SUBCUTANEOUS at 13:42

## 2018-08-16 RX ADMIN — SODIUM CHLORIDE TAB 1 GM 1 G: 1 TAB at 22:15

## 2018-08-16 RX ADMIN — TIMOLOL MALEATE 1 DROP: 5 SOLUTION/ DROPS OPHTHALMIC at 17:55

## 2018-08-16 RX ADMIN — SODIUM CHLORIDE TAB 1 GM 1 G: 1 TAB at 16:34

## 2018-08-16 RX ADMIN — HEPARIN SODIUM 5000 UNITS: 5000 INJECTION, SOLUTION INTRAVENOUS; SUBCUTANEOUS at 05:57

## 2018-08-16 RX ADMIN — VALACYCLOVIR 500 MG: 500 TABLET, FILM COATED ORAL at 09:48

## 2018-08-16 RX ADMIN — BRIMONIDINE TARTRATE 1 DROP: 1.5 SOLUTION OPHTHALMIC at 17:55

## 2018-08-16 RX ADMIN — VALACYCLOVIR 500 MG: 500 TABLET, FILM COATED ORAL at 16:34

## 2018-08-16 RX ADMIN — BIMATOPROST 1 DROP: 0.1 SOLUTION/ DROPS OPHTHALMIC at 22:17

## 2018-08-16 RX ADMIN — TRAZODONE HYDROCHLORIDE 50 MG: 50 TABLET ORAL at 22:16

## 2018-08-16 RX ADMIN — VALACYCLOVIR 500 MG: 500 TABLET, FILM COATED ORAL at 22:16

## 2018-08-16 NOTE — CASE MANAGEMENT
Continued Stay Review    Date: 8/16/18    ORTHOSTATIC VS:  LYING 157/68   SITTING 131/61  STANDING 117/59      Vital Signs: /58 (BP Location: Right arm)   Pulse 63   Temp 97 7 °F (36 5 °C) (Oral)   Resp 18   Ht 5' 11" (1 803 m)   Wt 65 5 kg (144 lb 6 4 oz)   SpO2 96%   BMI 20 14 kg/m²     Scheduled Meds:  Current Facility-Administered Medications:  acetaminophen 650 mg Oral Q6H PRN Henri Currie DO   bimatoprost 1 drop Both Eyes HS MISAEL Culver   brimonidine 1 drop Both Eyes BID Yolette Zuniga MD   And       timolol 1 drop Both Eyes BID Yolette Zuniga MD   busPIRone 5 mg Oral BID Radha Yu MD   heparin (porcine) 5,000 Units Subcutaneous Q8H [facility] Radha Yu MD   ondansetron 4 mg Intravenous Q6H PRN Radha Yu MD   OXcarbazepine 150 mg Oral Q12H Storm Quinonez MD   sodium chloride 1 g Oral BID (diuretic) Anurag Marin MD   traZODone 50 mg Oral HS Radha Yu MD   valACYclovir 500 mg Oral TID Radha Yu MD     Continuous Infusions:   PRN Meds:   acetaminophen    ondansetron        Age/Sex: 74 y o  male     ATTENDING  Hyponatremia  Hypo osmolar, secondary to SIADH related Trileptal  No improvement with IV fluids  Status post tolvaptan on 08/14 and 8/15 with improvement in sodium  Trileptal was started last week for headache before initiation of current symptoms  No history of seizure  Discussed with Dr Filippo Morin, okay to discontinue Trileptal after decreasing to 150 b i d  for 2 days  Follow up with neurologist after discharge  Discussed with Nephrology, addition of salt tablet today while Trileptal is being tapered off and in setting of orthostasis     NEPHROLOGY  ASSESSMENT and PLAN:  1  Hypo-osmolar hyponatremia  · 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  No evidence of hypothyroidism or adrenal insufficiency  · Na normalized with Tolvaptan 7 5 mg x 2 days     · Will give salt tablets x 2 doses today to prevent recurrence since Trileptal is still in the process of being weaned       2  Fall: (+) orthostatics - salt tablets should help  Taken off Flomax  3  Low cortisol: Cosyntropin stim test is negative       DISPOSITION:  · Salt tablets 1 gm PO BID x 2 doses today     Eating okay  Found to be orthostatic

## 2018-08-16 NOTE — PROGRESS NOTES
[address] [facility] PROGRESS NOTE   Jolanta Orellana 74 y o  male MRN: [id number]  Unit/Bed#: [address] Encounter: [id number]  Reason for Consult: Hyponatremia    ASSESSMENT and PLAN:  1  Hypo-osmolar hyponatremia  · 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  No evidence of hypothyroidism or adrenal insufficiency  · Na normalized with Tolvaptan 7 5 mg x 2 days  · Will give salt tablets x 2 doses today to prevent recurrence since Trileptal is still in the process of being weaned  2  Fall: (+) orthostatics - salt tablets should help  Taken off Flomax  3  Low cortisol: Cosyntropin stim test is negative  DISPOSITION:  · Salt tablets 1 gm PO BID x 2 doses today  Discussed with Dr Breanna Foster  SUBJECTIVE / INTERVAL HISTORY:  Eating okay  Found to be orthostatic  OBJECTIVE:  Current Weight: Weight - Scale: 65 5 kg (144 lb 6 4 oz)  Vitals:    08/16/18 1250 08/16/18 1255 08/16/18 1257 08/16/18 1310   BP: 139/63 126/61 102/51 115/56   BP Location: Left arm Left arm Left arm Left arm   Pulse: 63 74 84 70   Resp:    18   Temp:    (!) 96 4 °F (35 8 °C)   TempSrc:    Tympanic   SpO2:    100%   Weight:       Height:           Intake/Output Summary (Last 24 hours) at 08/16/18 1513  Last data filed at 08/16/18 1100   Gross per 24 hour   Intake                0 ml   Output              850 ml   Net             -850 ml     General: conscious, cooperative, no distress  Chest/Lungs: clear breath sounds  CVS: distinct heart sounds, normal rate  Abdomen: soft  Extremities: no edema       Medications:    Current Facility-Administered Medications:     acetaminophen (TYLENOL) tablet 650 mg, 650 mg, Oral, Q6H PRN, Henri Currie, , 650 mg at 08/15/18 0415    bimatoprost (LUMIGAN) 0 01 % ophthalmic solution 1 drop, 1 drop, Both Eyes, HS, MSIAEL Culver, 1 drop at 08/15/18 2155    brimonidine (ALPHAGAN P) 0 15 % ophthalmic solution 1 drop, 1 drop, Both Eyes, BID **AND** timolol (TIMOPTIC) 0 5 % ophthalmic solution 1 drop, 1 drop, Both Eyes, BID, Madiha Kay MD    busPIRone (BUSPAR) tablet 5 mg, 5 mg, Oral, BID, Mary Beth Artis MD, 5 mg at 08/16/18 0948    heparin (porcine) subcutaneous injection 5,000 Units, 5,000 Units, Subcutaneous, Q8H [facility], 5,000 Units at 08/16/18 1342 **AND** [CANCELED] Platelet count, , , Once, Mary Beth Artis MD    ondansetron [facility]) injection 4 mg, 4 mg, Intravenous, Q6H PRN, Mary Beth Artis MD, 4 mg at 08/15/18 0417    OXcarbazepine (TRILEPTAL) tablet 150 mg, 150 mg, Oral, Q12H [facility], Madiha Kay MD, 150 mg at 08/16/18 1506    traZODone (DESYREL) tablet 50 mg, 50 mg, Oral, HS, Mary Beth Artis MD, 50 mg at 08/15/18 2155    valACYclovir (VALTREX) tablet 500 mg, 500 mg, Oral, TID, Mary Beth Artis MD, 500 mg at 08/16/18 9314    Laboratory Results:    Results from last 7 days  Lab Units 08/16/18  0504 08/15/18  0552 08/14/18  0449 08/13/18  0510 08/13/18  0046 08/12/18  2026   WBC Thousand/uL  --  7 57  --  4 48  --  6 31   HEMOGLOBIN g/dL  --  12 0  --  11 8*  --  10 2*   HEMATOCRIT %  --  37 1  --  36 7  --  31 6*   PLATELETS Thousands/uL  --  217  --  194  --  172   SODIUM mmol/L 136 130* 121* 128* 127* 124*   POTASSIUM mmol/L 4 0 3 8 3 8 3 9  --  3 8   CHLORIDE mmol/L 100 98* 90* 94*  --  93*   CO2 mmol/L 28 26 24 25  --  28   BUN mg/dL 14 11 13 21  --  28*   CREATININE mg/dL 1 05 1 01 0 63 0 74  --  0 96   CALCIUM mg/dL 9 3 9 1 8 8 8 6  --  8 5   TOTAL PROTEIN g/dL  --   --   --   --   --  5 8*   GLUCOSE RANDOM mg/dL 82 95 88 73  --  126     Work up:  8/14/18 Uric acid 2 5, Cortisol 8 5, TSH 1 210, Tawanda stim 31 8 -> 38 0  8/13/18 UOsm 462, Maximino 72, SOsm 264

## 2018-08-16 NOTE — PROGRESS NOTES
[facility] Progress Note  Patient: Army Dickerson 74 y o  male   MRN: [id number]  PCP: Rajwinder Horowitz MD  Unit/Bed#: [address] [place] [address] A Encounter: [id number]  Date Of Visit: 08/16/18    Problem List:    Principal Problem:    Hyponatremia  Active Problems:    Frequent falls    Chronic headache    Depression    Shingles    Urinary frequency      Assessment & Plan:    * Hyponatremia   Assessment & Plan    Hypo osmolar, secondary to SIADH related Trileptal  No improvement with IV fluids  Uric acid level is low   TSH within normal limit  Cortisol level low normal, cosyntropin test without evidence of adrenal insufficiency  Likely secondary to recent addition of Trileptal  Nephrology input appreciated  Status post tolvaptan on 08/14 and 8/15 with improvement in sodium  Trileptal was started last week for headache before initiation of current symptoms  No history of seizure  Discussed with ivania Barry to discontinue Trileptal after decreasing to 150 b i d  for 2 days  Follow up with neurologist after discharge  Discussed with Nephrology, addition of salt tablet today while Trileptal is being tapered off and in setting of orthostasis            Orthostasis   Assessment & Plan    Likely contributing to falls  Will discontinue Flomax as above  Rodolfo stockings  Monitor        Frequent falls   Assessment & Plan    Falls, multifactorial   Likely with contribution from hyponatremia and orthostasis  Patient denies any syncopal symptoms, but reports dizziness  CT head is negative for an acute process  Recent B12 within normal limit  Monitor orthostasis, Flomax was discontinued  Sodium is better  Continue PT OT  Fall precaution        Urinary frequency   Assessment & Plan    Patient was recently started on Flomax in last 2 weeks due to urinary incontinence and frequency  Discussed with Dr Ana Maria Stubbs, urology  In view of significant orthostasis and falls will discontinue Flomax    Monitor bladder scan  Monitor orthostasis  Follow up with Urology as outpatient as scheduled in 2 weeks  Discussed with patient and wife, advised to call for earlier appointment if significant worsening of symptoms        Shingles   Assessment & Plan    Continue Valtrex        Depression   Assessment & Plan    Continue Buspar        Chronic headache   Assessment & Plan    No further headache  Discontinuing Trileptal for now  Follow up with Neurology as outpatient         Chest painresolved as of 8/14/2018   Assessment & Plan    likely secondary to trauma  CXR with no acute rib fracture appreciated, but rib series reveals 7th rib fracture    EKG reveals sinus bradycardia  troponin negative  Denies any further pain               VTE Pharmacologic Prophylaxis:   Pharmacologic: Heparin  Mechanical VTE Prophylaxis in Place: No    Patient Centered Rounds: I have performed bedside rounds with nursing staff today  Discussions with Specialists or Other Care Team Provider: Yes, Dr Shivam Tesfaye  Discussed with Dr Amy Cortes and Dr Gabriel Valdez over the phone    Education and Discussions with Family / Patient:Yes discussed with patient's wife at bedside  Time Spent for Care: 45 minutes  More than 50% of total time spent on counseling and coordination of care as described above      Current Length of Stay: 4 day(s)    Current Patient Status: Inpatient     Discharge Plan:  Home when clinically improved    Code Status: Level 1 - Full Code    Certification Statement: The patient will continue to require additional inpatient hospital stay due to Hyponatremia      Subjective:   Feels much better  Denies confusion or imbalance  Noted to be significantly orthostasis with dizziness  Almost fell while standing up for orthostasis  Denies chest pain, shortness of breath  Denies any headache  Eating well        Objective:     Comfortably sitting in chair    Negative for Chest Pain, Palpitations, Shortness of Breath, Abdominal Pain, Nausea, Vomiting, Constipation, Diarrhea, Dizziness  All other 10 review of systems negative and without drastic interval changes from yesterday  Vitals:   Temp (24hrs), Av 1 °F (36 2 °C), Min:96 4 °F (35 8 °C), Max:97 7 °F (36 5 °C)    HR:  [60-84] 70  Resp:  [18] 18  BP: ()/(51-86) 115/56  SpO2:  [96 %-100 %] 100 %  Body mass index is 20 14 kg/m²  Input and Output Summary (last 24 hours): Intake/Output Summary (Last 24 hours) at 18 1428  Last data filed at 18 1100   Gross per 24 hour   Intake                0 ml   Output              850 ml   Net             -850 ml       Physical Exam:     Physical Exam   Constitutional: He is oriented to person, place, and time  He appears well-developed  No distress  HENT:   Head: Normocephalic and atraumatic  Nose: Nose normal    Mouth/Throat: Oropharynx is clear and moist    Eyes: Conjunctivae and EOM are normal  Pupils are equal, round, and reactive to light  Neck: Normal range of motion  Neck supple  No JVD present  Cardiovascular: Normal rate, regular rhythm and normal heart sounds  Exam reveals no gallop and no friction rub  No murmur heard  Pulmonary/Chest: Effort normal and breath sounds normal  No respiratory distress  He has no wheezes  He has no rales  He exhibits no tenderness  Abdominal: Soft  Bowel sounds are normal  He exhibits no distension  There is no tenderness  There is no rebound and no guarding  Musculoskeletal: He exhibits no edema  Neurological: He is alert and oriented to person, place, and time  No cranial nerve deficit  Skin: Skin is warm and dry  No rash noted     Psychiatric: He has a normal mood and affect              Additional Data:     Labs:      Results from last 7 days  Lab Units 08/15/18  0552   WBC Thousand/uL 7 57   HEMOGLOBIN g/dL 12 0   HEMATOCRIT % 37 1   PLATELETS Thousands/uL 217   NEUTROS PCT % 81*   LYMPHS PCT % 10*   MONOS PCT % 9   EOS PCT % 0       Results from last 7 days  Lab Units 18  0504 08/12/18 2026   SODIUM mmol/L 136  < > 124*   POTASSIUM mmol/L 4 0  < > 3 8   CHLORIDE mmol/L 100  < > 93*   CO2 mmol/L 28  < > 28   BUN mg/dL 14  < > 28*   CREATININE mg/dL 1 05  < > 0 96   CALCIUM mg/dL 9 3  < > 8 5   TOTAL PROTEIN g/dL  --   --  5 8*   BILIRUBIN TOTAL mg/dL  --   --  0 20   ALK PHOS U/L  --   --  67   ALT U/L  --   --  19   AST U/L  --   --  18   GLUCOSE RANDOM mg/dL 82  < > 126   < > = values in this interval not displayed  Results from last 7 days  Lab Units 08/12/18 2026   INR  0 98       * I Have Reviewed All Lab Data Listed Above  * Additional Pertinent Lab Tests Reviewed: [facility] Admission Reviewed    Imaging:  X-ray Chest 1 View Portable    Result Date: 8/13/2018  Narrative: CHEST INDICATION:   cp  COMPARISON:  11/28/2016 EXAM PERFORMED/VIEWS:  XR CHEST PORTABLE 1 image FINDINGS: Cardiomediastinal silhouette appears upper limits of normal in size  No evidence of heart failure  The lungs are clear  No pneumothorax or pleural effusion  Osseous structures appear within normal limits for patient age  Impression: No acute cardiopulmonary disease  Workstation performed: [id number]     Ct Head Without Contrast    Result Date: 8/12/2018  Narrative: CT BRAIN - WITHOUT CONTRAST INDICATION:   Multipe falls  COMPARISON:  CT head 9/21/2016 TECHNIQUE:  CT examination of the brain was performed  In addition to axial images, coronal 2D reformatted images were created and submitted for interpretation  Radiation dose length product (DLP) for this visit:  1296 87 mGy-cm   This examination, like all CT scans performed in the [facility], was performed utilizing techniques to minimize radiation dose exposure, including the use of iterative reconstruction and automated exposure control  IMAGE QUALITY:  Diagnostic  FINDINGS: PARENCHYMA:  No intracranial mass, mass effect or midline shift  No CT signs of acute infarction  No acute parenchymal hemorrhage  Age-related cortical atrophy  Periventricular white matter hypodensity which is nonspecific although most compatible with  chronic small vessel ischemic disease  VENTRICLES AND EXTRA-AXIAL SPACES:  Stable  VISUALIZED ORBITS AND PARANASAL SINUSES:  There is heterogeneous soft tissue density within the left maxillary sinus which is completely opacified and mildly expanded with chronic wall thickening  There is central increased density and calcification which could be related to inspissated secretions, colonization with fungal disease cannot be excluded  Soft tissue obscures and expands the ipsilateral ostiomeatal unit  There is opacification of some adjacent ethmoid air cells as well  Bilateral lens implants  The mastoid air cells are clear  CALVARIUM AND EXTRACRANIAL SOFT TISSUES:  There is mild posterior scalp soft tissue swelling  No calvarial fracture  Impression: No acute intracranial abnormality  Mild posterior scalp soft tissue swelling without calvarial fracture  Complete opacification and expansion of the left maxillary sinus with heterogeneous soft tissue demonstrating central increased density and calcification  Findings appear chronic although the expansion has progressed now with some opacification of the left ethmoid air cells  There is no overt bony destruction  Findings favor chronic sinusitis/mucocele although chronic fungal disease could potentially look similar  Nonemergent ENT consultation recommended   Workstation performed: [id number]     Imaging Reports Reviewed by myself    Cultures:   Blood Culture: No results found for: BLOODCX  Urine Culture: No results found for: URINECX  Sputum Culture: No components found for: SPUTUMCX  Wound Culture: No results found for: WOUNDCULT    Last 24 Hours Medication List:     Current Facility-Administered Medications:  acetaminophen 650 mg Oral Q6H PRN Henri Currie DO   bimatoprost 1 drop Both Eyes HS Verdie Gilford, CRNP   brimonidine 1 drop Both Eyes BID Jesi Urrutia MD   And       timolol 1 drop Both Eyes BID Jesi Urrutia MD   busPIRone 5 mg Oral BID Sheila Shah MD   heparin (porcine) 5,000 Units Subcutaneous Q8H [facility] Sheila Shah MD   ondansetron 4 mg Intravenous Q6H PRN Sheila Shah MD   OXcarbazepine 150 mg Oral Q12H [facility] Jesi Urrutia MD   traZODone 50 mg Oral HS Sheila Shah MD   valACYclovir 500 mg Oral TID Sheila Shah MD        Today, Patient Was Seen By: Jesi Urrutia MD    ** Please Note:  Dictation voice to text software may have been used in the creation of this document   **

## 2018-08-16 NOTE — TELEPHONE ENCOUNTER
Dr Meaghan Vicente:    Patient is being discharged today from [facility]  Joshua Barney is calling to get verbal order for nurse visits and PT

## 2018-08-16 NOTE — ASSESSMENT & PLAN NOTE
Likely contributing to falls  Discontinued Flomax as above  Rodolfo stockings  Started on Florinef  Monitor  Monitor orthostasis

## 2018-08-16 NOTE — PROGRESS NOTES
Patient fell  Nursing staffs found patient on floor  On call resident is paged to assess the patient  Vital signs and blood sugar are taken and all are stable  Patient is alert and oriented x 2  Patient denies pain  There is a bruise on the left hip but that is old from admission and no new bruising noted at this time  No bleeding or wound noted at this time  Patient is comfortable now on bed  No other complaints noted

## 2018-08-17 VITALS
SYSTOLIC BLOOD PRESSURE: 109 MMHG | DIASTOLIC BLOOD PRESSURE: 59 MMHG | BODY MASS INDEX: 20.22 KG/M2 | RESPIRATION RATE: 18 BRPM | HEIGHT: 71 IN | OXYGEN SATURATION: 97 % | HEART RATE: 66 BPM | WEIGHT: 144.4 LBS | TEMPERATURE: 97.9 F

## 2018-08-17 PROBLEM — B02.9 SHINGLES: Status: RESOLVED | Noted: 2018-08-12 | Resolved: 2018-08-17

## 2018-08-17 PROBLEM — E87.1 HYPONATREMIA: Status: RESOLVED | Noted: 2018-08-12 | Resolved: 2018-08-17

## 2018-08-17 LAB
ANION GAP SERPL CALCULATED.3IONS-SCNC: 8 MMOL/L (ref 4–13)
BUN SERPL-MCNC: 17 MG/DL (ref 5–25)
CALCIUM SERPL-MCNC: 9.3 MG/DL (ref 8.3–10.1)
CHLORIDE SERPL-SCNC: 100 MMOL/L (ref 100–108)
CO2 SERPL-SCNC: 26 MMOL/L (ref 21–32)
CREAT SERPL-MCNC: 0.92 MG/DL (ref 0.6–1.3)
GFR SERPL CREATININE-BSD FRML MDRD: 81 ML/MIN/1.73SQ M
GLUCOSE SERPL-MCNC: 87 MG/DL (ref 65–140)
POTASSIUM SERPL-SCNC: 4.2 MMOL/L (ref 3.5–5.3)
SODIUM SERPL-SCNC: 134 MMOL/L (ref 136–145)

## 2018-08-17 PROCEDURE — 97110 THERAPEUTIC EXERCISES: CPT

## 2018-08-17 PROCEDURE — 99239 HOSP IP/OBS DSCHRG MGMT >30: CPT | Performed by: INTERNAL MEDICINE

## 2018-08-17 PROCEDURE — 80048 BASIC METABOLIC PNL TOTAL CA: CPT | Performed by: INTERNAL MEDICINE

## 2018-08-17 PROCEDURE — 99232 SBSQ HOSP IP/OBS MODERATE 35: CPT | Performed by: INTERNAL MEDICINE

## 2018-08-17 RX ORDER — FLUDROCORTISONE ACETATE 0.1 MG/1
0.1 TABLET ORAL DAILY
Status: DISCONTINUED | OUTPATIENT
Start: 2018-08-17 | End: 2018-08-17 | Stop reason: HOSPADM

## 2018-08-17 RX ORDER — SODIUM CHLORIDE 1000 MG
1 TABLET, SOLUBLE MISCELLANEOUS
Status: DISCONTINUED | OUTPATIENT
Start: 2018-08-17 | End: 2018-08-17

## 2018-08-17 RX ORDER — FLUDROCORTISONE ACETATE 0.1 MG/1
0.1 TABLET ORAL DAILY
Qty: 30 TABLET | Refills: 0 | Status: SHIPPED | OUTPATIENT
Start: 2018-08-18 | End: 2018-08-23 | Stop reason: SDUPTHER

## 2018-08-17 RX ADMIN — OXCARBAZEPINE 150 MG: 150 TABLET ORAL at 08:40

## 2018-08-17 RX ADMIN — BRIMONIDINE TARTRATE 1 DROP: 1.5 SOLUTION OPHTHALMIC at 17:15

## 2018-08-17 RX ADMIN — TIMOLOL MALEATE 1 DROP: 5 SOLUTION/ DROPS OPHTHALMIC at 17:15

## 2018-08-17 RX ADMIN — VALACYCLOVIR 500 MG: 500 TABLET, FILM COATED ORAL at 16:32

## 2018-08-17 RX ADMIN — SODIUM CHLORIDE TAB 1 GM 1 G: 1 TAB at 11:16

## 2018-08-17 RX ADMIN — BRIMONIDINE TARTRATE 1 DROP: 1.5 SOLUTION OPHTHALMIC at 08:41

## 2018-08-17 RX ADMIN — TIMOLOL MALEATE 1 DROP: 5 SOLUTION/ DROPS OPHTHALMIC at 08:41

## 2018-08-17 RX ADMIN — FLUDROCORTISONE ACETATE 0.1 MG: 0.1 TABLET ORAL at 14:36

## 2018-08-17 RX ADMIN — BUSPIRONE HYDROCHLORIDE 5 MG: 5 TABLET ORAL at 17:15

## 2018-08-17 RX ADMIN — BUSPIRONE HYDROCHLORIDE 5 MG: 5 TABLET ORAL at 08:40

## 2018-08-17 RX ADMIN — HEPARIN SODIUM 5000 UNITS: 5000 INJECTION, SOLUTION INTRAVENOUS; SUBCUTANEOUS at 14:37

## 2018-08-17 RX ADMIN — HEPARIN SODIUM 5000 UNITS: 5000 INJECTION, SOLUTION INTRAVENOUS; SUBCUTANEOUS at 06:54

## 2018-08-17 RX ADMIN — VALACYCLOVIR 500 MG: 500 TABLET, FILM COATED ORAL at 08:40

## 2018-08-17 NOTE — [place] UNIVERSAL TRANSFER FORM
[place] UNIVERSAL TRANSFER FORM  (ALL ITEMS MUST BE COMPLETED)    1  TRANSFER FROM: [address]      TRANSFER TO: home    2  DATE OF TRANSFER: 8/17/2018                        TIME OF TRANSFER: 1800    3  PATIENT NAME: RD Willard      YOB: 1945                             GENDER: male    4  LANGUAGE:   English    5  PHYSICIAN NAME:  Lawrence Pillai MD                   PHONE: [phone]  CODE STATUS: Level 1 - Full Code        Out of Hospital DNR Attached: No    7  :                                      :  Extended Emergency Contact Information  Primary Emergency Contact: Kirsten Li  Address: [address] Phone: [phone]  Relation: Heaven Shearer [address] Representative/Proxy:  No           Legal Guardian:  No             NAME OF:           HEALTH CARE REPRESENTATIVE/PROXY:                                         OR           LEGAL GUARDIAN, IF NOT :                                               PHONE:  (Day)           (Night)                        (Cell)    8  REASON FOR TRANSFER: (Must include brief medical history and recent changes in physical function or cognition ) home with visiting nurse            V/S: /59 (BP Location: Left arm)   Pulse 66   Temp 97 9 °F (36 6 °C) (Oral)   Resp 18   Ht 5' 11" (1 803 m)   Wt 65 5 kg (144 lb 6 4 oz)   SpO2 97%   BMI 20 14 kg/m²           PAIN: None    9  PRIMARY DIAGNOSIS: Hyponatremia      Secondary Diagnosis:         Pacemaker: No      Internal Defib: No          Mental Health Diagnosis (if Applicable):    10  RESTRAINTS: No     11  RESPIRATORY NEEDS: None    12  ISOLATION/PRECAUTION: None    13  ALLERGY: Patient has no known allergies  14  SENSORY:       Vision Good    15  SKIN CONDITION: No Wounds    16  DIET: Special (describe)Regular Diet    17   IV ACCESS: None    18  PERSONAL ITEMS SENT WITH PATIENT: None    19  ATTACHED DOCUMENTS: MUST ATTACH CURRENT MEDICATION INFORMATION MAR and Discharge Summary    20  AT RISK ALERTS:Falls        HARM TO: N/A    21  WEIGHT BEARING STATUS:         Left Leg: None        Right Leg: None    22  MENTAL STATUS:Alert    23  FUNCTION:        Walk: With Help        Transfer: With Help        Toilet: With Help        Feed: Self    24  IMMUNIZATIONS/SCREENING:     Immunization History   Administered Date(s) Administered    Influenza Split High Dose Preservative Free IM 10/03/2013, 09/10/2014, 09/23/2015, 11/07/2016, 09/26/2017    Influenza TIV (IM) 01/31/2005, 10/30/2006, 10/24/2007, 10/23/2008, 09/21/2009, 09/27/2010, 10/25/2011, 11/06/2012    Pneumococcal Conjugate 13-Valent 04/03/2017    Pneumococcal Polysaccharide PPV23 10/24/2007    Tdap 09/21/2016, 09/21/2016, 08/30/2017       25  BOWEL: Continent    26  BLADDER: Continent    27   SENDING FACILITY CONTACT: Stefania Shen                  Title: [facility]        Unit: [address] [place] [address]        Phone: [phone] [address] (if known):        Title:        Unit:         Phone:         FORM PREFILLED BY (if applicable)       Title:       Unit:        Phone:         FORM COMPLETED BY Isabelle Goins RN      Title: [facility]      Phone: [phone]

## 2018-08-17 NOTE — DISCHARGE INSTRUCTIONS
Hypotension   WHAT YOU NEED TO KNOW:   Hypotension is a condition that causes your blood pressure (BP) to drop lower than it should be  Hypotension may be mild, serious, or life-threatening  DISCHARGE INSTRUCTIONS:   Medicines:   · Alpha-adrenoreceptor agonists: These medicines may increase your BP and decrease your symptoms  · Steroids: This medicine helps prevent salt loss from your body  Steroids may also help increase the amount of fluid in your body and raise your BP  · Vasopressors: These medicines help constrict (make smaller) your blood vessels and increase your BP  Vasopressor medicines may increase the blood flow to your brain and help decrease your symptoms  · Antidiuretic hormone: This medicine helps control your BP and helps decrease your need to urinate during the night  · Antiparkinson medicine: This medicine may help increase your standing BP and decrease your symptoms  · Take your medicine as directed  Contact your healthcare provider if you think your medicine is not helping or if you have side effects  Tell him or her if you are allergic to any medicine  Keep a list of the medicines, vitamins, and herbs you take  Include the amounts, and when and why you take them  Bring the list or the pill bottles to follow-up visits  Carry your medicine list with you in case of an emergency  Follow up with your healthcare provider or specialist as directed:  Write down your questions so you remember to ask them during your visits  Check your blood pressure: You may need to check your BP at home  Record the results and bring them with you to follow-up visits  Ask when and how often to check your BP  You may need to wear a BP monitor for up to 24 hours  This will record your blood pressure during your normal daily activities  The monitor will take your BP every 15 to 30 minutes  Try to keep still while your BP is taken   Avoid heavy activity, such as exercise, while you are wearing the monitor  Manage your symptoms:   · Change positions slowly:  When you get out of bed, sit up first, then slowly move your legs to the side of the bed  If you are not having any symptoms, slowly stand up  If you have symptoms, sit down right away  · Exercise and do physical counter maneuvers:  Ask your healthcare provider or specialist about the best exercise plan for you  Physical counter maneuvers may help to increase your BP and increase blood flow to your heart  They include crossing your legs, squatting, and bending at the waist  You can also rise up on your toes while you are standing, and tighten your thigh muscles  · Drink liquids as directed:  Ask your healthcare provider or specialist how much liquid to drink each day and which liquids are best for you  Drink 500 milliliters (½ liter) of liquid quickly in the morning or before meals to help increase your BP  Your healthcare provider may tell you to drink 2 cups of coffee with, or after, breakfast and lunch  The caffeine in the coffee can help prevent a drop in your BP  Your healthcare provider may also give you caffeine pills  · Change how you eat meals: If your BP drops after eating large meals, try to eat smaller meals more often  Eat foods low in carbohydrates and cholesterol to help prevent BP drops after you eat  Ask if you need to increase the amount of sodium (salt) you eat each day  · Raise the head of your bed:  Raise the head of your bed 4 to 8 inches  This may help prevent morning BP drops and decrease the need to urinate during the night  Avoid things that make your hypotension worse:   · Do not drink alcohol:  Alcohol can make your symptoms worse  Ask for information if you need help quitting  · Avoid straining:  Activities and movements that cause you to strain can cause a drop in your BP   Activities to avoid include lifting, coughing, and other movements that increase the feeling of pressure in your chest     · Avoid the heat: This can cause a decrease in your BP  Stay inside during very hot days, or limit the amount of time you are outside  Do not take hot baths  Contact your healthcare provider or specialist if:   · You vomit several times or have diarrhea, and you cannot drink liquid  · You have a fever  · You have new or increased symptoms, such as dizziness, weakness, or fainting  · Your legs, ankles, and feet are swollen, or you gain weight for no known reason  · You have questions or concerns about your condition or care  Seek care immediately or call 911 if:   · You become confused or cannot speak  · You urinate very little or not at all  · You have a seizure  · You have chest pain or trouble breathing  · You have changes in vision or cannot see  © 2017 [address] Information is for End User's use only and may not be sold, redistributed or otherwise used for commercial purposes  All illustrations and images included in CareNotes® are the copyrighted property of [employer]  or Felipe Treviño  The above information is an  only  It is not intended as medical advice for individual conditions or treatments  Talk to your doctor, nurse or pharmacist before following any medical regimen to see if it is safe and effective for you  Urinary Urgency and Frequency   WHAT YOU NEED TO KNOW:   What is urinary urgency and frequency? Urinary urgency and frequency is a condition that increases how strongly or how often you need to urinate  The condition may also be called urgency-frequency syndrome  Urinary urgency means you feel such a strong need to urinate that you have trouble waiting  You may also feel discomfort in your bladder  Urinary frequency means you need to urinate many times during the day  This may also be called increased daytime frequency  You may be woken from sleep by the need to urinate   Urgency and frequency often happen together, but you may only have one  What causes urinary urgency and frequency? · A urinary tract injury or infection (UTI), or a chronic bladder infection    · Infection in your urethra, or urine leaking from your urethra    · A nerve problem, or radiation treatment for cancer    · A medical condition, such as bladder cancer, diabetes, or a stroke    · Anxiety    · In women, pregnancy, menopause, or a vaginal infection    · In men, prostate infections, swelling, or enlargement  How are urination problems diagnosed? Your healthcare provider will ask questions about your symptoms  The provider will check your pelvic area and abdomen for problems that may be causing your symptoms  Tell the provider about any medical conditions you have and the medicines you take  You may need any of the following:  · Blood and urine tests  may be done to look for signs of infection, or blood in your urine  Your blood glucose (sugar) level may also be tested  · An ultrasound  may be used to measure the amount of urine in your bladder after you urinate  · A cystoscopy  may show problems inside your bladder  The cystoscope is a long tube with a lens and a light on the end  · Urodynamic testing  may show how well your bladder works  How is urinary urgency and frequency treated? Treatment will depend on the type and cause of your urination problems  You may need any of the following:  · Medicines  may be given to relax your bladder and decrease urination  You may also need antibiotics if your symptoms are caused by a bacterial infection  · Sacral nerve stimulation  sends electrical signals to your sacral nerve through a small device implanted under your skin  Your sacral nerve controls your bladder, sphincter, and pelvic floor muscles  · Botox injections  into your bladder may help relax your bladder muscle to decrease urgency and frequency      · Surgery  may be done if all other treatments cannot help you control your bladder  What can I do to manage urinary urgency and frequency? · Keep a record of your urination patterns for a few days  Write down the number of times you urinate over 24 hours, the amount, and if you have urine leakage  Record how strong the urge to urinate was each time  Your healthcare provider may also want you to record the type and amount of liquids you drink  · Train your bladder  Go to the bathroom at set times, such as every 2 hours, even if you do not feel the urge to go  You can also try to hold your urine when you feel the urge to go  For example, hold your urine for 5 minutes when you feel the urge to go  As that becomes easier, hold your urine for 10 minutes  Work up to every 3 or 4 hours to help control your bladder  · Limit liquids as directed  Limit liquids to decrease the amount you urinate  Ask how much liquid to drink each day and which liquids are best for you  You may need to avoid drinking liquids several hours before you go to sleep  Your healthcare provider may also recommend that you limit caffeine and alcohol  · Do Kegel exercises often  Kegel exercises help strengthen your pelvic muscles and improve bladder control  These muscles help you stop urinating  Squeeze these muscles tightly for 5 seconds like you are trying to stop the flow of urine  Then relax for 5 seconds  Gradually work up to squeezing for 10 seconds  Do 3 sets of 15 repetitions a day, or as directed  · Exercise regularly and maintain a healthy weight  Ask your healthcare provider how much you should weigh and about the best exercise plan for you  Extra weight puts pressure on your bladder and may make your symptoms worse  Ask your provider to help you create a safe weight loss plan if you are overweight  When should I contact my healthcare provider? · Your urine is pink, or you notice blood in your urine  · You have pain with urination       · You continue to have symptoms even after you take your medicine  · You have new or worsening symptoms  · You have questions or concerns about your condition or care  CARE AGREEMENT:   You have the right to help plan your care  Learn about your health condition and how it may be treated  Discuss treatment options with your caregivers to decide what care you want to receive  You always have the right to refuse treatment  The above information is an  only  It is not intended as medical advice for individual conditions or treatments  Talk to your doctor, nurse or pharmacist before following any medical regimen to see if it is safe and effective for you  © 2017 [address] [facility] Information is for End User's use only and may not be sold, redistributed or otherwise used for commercial purposes  All illustrations and images included in CareNotes® are the copyrighted property of [employer] M [employer]  or Felipe Treviño    Monitor orthostatic blood pressure with VNA

## 2018-08-17 NOTE — PLAN OF CARE
Problem: PHYSICAL THERAPY ADULT  Goal: Performs mobility at highest level of function for planned discharge setting  See evaluation for individualized goals  Outcome: Progressing  Prognosis: Good  Problem List: Decreased strength, Impaired balance, Decreased mobility  Assessment: Pt demonstrates improving balance and function as pt is able to ambulate with/without cane/walker with supervision  Pt demonstrates balance loss with exercises challenging standing dynamic balance  Recommend follow up with PT upon DC to address balance defecits  Recommendation: Home PT, 24 hour supervision/assist (home PT vs balance center)          See flowsheet documentation for full assessment

## 2018-08-17 NOTE — PHYSICAL THERAPY NOTE
PT TREATMENT     08/17/18 1301   Pain Assessment   Pain Assessment No/denies pain   Restrictions/Precautions   Other Precautions Fall Risk;Bed Alarm; Chair Alarm   General   Chart Reviewed Yes   Cognition   Overall Cognitive Status WFL   Memory Decreased short term memory   Subjective   Subjective "I love to exercise, I exercise everyday"   Transfers   Sit to Stand 5  Supervision   Stand to Sit 5  Supervision   Stand pivot 5  Supervision   Ambulation/Elevation   Gait pattern Short stride;Narrow JACKIE   Gait Assistance 5  Supervision   Assistive Device (rolling walker, cane, none)   Distance 250 feet each   Stair Management Assistance 5  Supervision   Stair Management Technique Two rails   Number of Stairs 4   Balance   Static Sitting Good   Dynamic Sitting Fair   Static Standing Fair   Dynamic Standing Fair   Exercises   Balance training  standing eyes closed 2x20 sec, standing head turns side/side/up/down 10 each, standing external perterbations, standing alternating forward or side stepping, marching in place x 10 each leg without UE support   Assessment   Prognosis Good   Problem List Decreased strength; Impaired balance;Decreased mobility   Assessment Pt demonstrates improving balance and function as pt is able to ambulate with/without cane/walker with supervision  Pt demonstrates balance loss with exercises challenging standing dynamic balance  Recommend follow up with PT upon DC to address balance defecits  Goals   Treatment Day 4   Plan   Treatment/Interventions ADL retraining;Functional transfer training;LE strengthening/ROM; Endurance training; Therapeutic exercise;Elevations; Patient/family training;Equipment eval/education; Bed mobility;Gait training   Progress Progressing toward goals   Recommendation   Recommendation Home PT;24 hour supervision/assist  (home PT vs balance center)   Licensure   [place] License Number  [facility] PT  [id number]

## 2018-08-17 NOTE — DISCHARGE SUMMARY
Discharge Summary - [facility] Internal Medicine    Patient Information: Liliana Garcia 74 y o  male MRN: [id number]  Unit/Bed#: [address]  A Encounter: [id number]    Discharging Physician / Practitioner: Breck Mohs, MD  PCP: Moe Gibson MD  Admission Date: 8/12/2018  Discharge Date: 08/17/18    Reason for Admission: Multiple Falls (Patient fell this afternoon started to not feel good as the day went home  States that he thinks he tripped  He also states that he's been fall alot more  )      Discharge Diagnoses:     Principal Problem:    Hyponatremia  Active Problems:    Frequent falls    Orthostasis    Chronic headache    Depression    Shingles    Urinary frequency  Resolved Problems:    Chest pain        * Hyponatremiaresolved as of 8/17/2018   Assessment & Plan    Hypo osmolar, secondary to SIADH related Trileptal  No improvement with IV fluids  Uric acid level is low   TSH within normal limit  Cortisol level low normal, cosyntropin test without evidence of adrenal insufficiency  Likely secondary to recent addition of Trileptal  Nephrology input appreciated  Status post tolvaptan on 08/14 and 8/15 with improvement in sodium  Trileptal was started last week for headache before initiation of current symptoms  No history of seizure   Discussed with Dr Marietta Jimenez, okay to discontinue Trileptal after decreasing to 150 b i d  for 2 days  Trileptal was tapered off   Follow up with neurologist after discharge  Sodium normalized, patient was started on Florinef due to orthostatic hypertension which also will with hyponatremia  BMP in 1 week, follow up with Nephrology            Orthostasis   Assessment & Plan    Likely contributing to falls  Discontinued Flomax as above  Rodolfo stockings  Started on Florinef  Monitor  Monitor orthostasis        Frequent falls   Assessment & Plan    Falls, multifactorial   Likely with contribution from hyponatremia and orthostasis with gait dysfunction  Patient denies any syncopal symptoms, but reports dizziness  CT head is negative for an acute process  Recent B12 within normal limit  Monitor orthostasis, Flomax was discontinued  Sodium is better  Continue PT OT  Fall precaution, use walker all the time with ambulation  Educated to use Rodolfo stockings while ambulatory  Strict orthostatic precaution explained  Discussed with patient and wife, declines rehab  Will arrange VNA for PT/OT        Urinary frequency   Assessment & Plan    Patient was recently started on Flomax in last 2 weeks due to urinary incontinence and frequency  Discussed with Dr Veronique Kaplan, urology  In view of significant orthostasis and falls will discontinue Flomax  Bladder scan acceptable  Monitor orthostasis  Follow up with Urology as outpatient as scheduled next week  Discussed with patient and wife, advised to call for earlier appointment if significant worsening of symptoms        Depression   Assessment & Plan    Continue Buspar        Chronic headache   Assessment & Plan    No further headache  Discontinuing Trileptal for now  Follow up with Neurology as outpatient         Shinglesresolved as of 8/17/2018   Assessment & Plan    Continue Valtrex        Chest painresolved as of 8/14/2018   Assessment & Plan    likely secondary to trauma  CXR on admission with no acute rib fracture, but rib series reveals 7th rib fracture subsequently  Likely POA as patient had fall on left side prior to admission and localized pain since that time    EKG reveals sinus bradycardia  troponin negative     Denies any further pain             [facility] During Hospital Stay[facility]  [facility]    Procedures Performed:     · None    Significant Findings:     · Noted to be orthostatic  · See above    Imaging while in hospital:    X-ray Chest 1 View Portable    Result Date: 8/13/2018  Narrative: CHEST INDICATION:   cp  COMPARISON:  11/28/2016 EXAM PERFORMED/VIEWS:  XR CHEST PORTABLE 1 image FINDINGS: Cardiomediastinal silhouette appears upper limits of normal in size  No evidence of heart failure  The lungs are clear  No pneumothorax or pleural effusion  Osseous structures appear within normal limits for patient age  Impression: No acute cardiopulmonary disease  Workstation performed: [id number]     Xr Ribs Left W Pa Chest Min 3 Views    Result Date: 8/15/2018  Narrative: LEFT RIBS AND CHEST -DUAL ENERGY INDICATION:   pain s/p fall  COMPARISON:  Chest radiograph 8/12/2018 at 9:00 PM EXAM PERFORMED/VIEWS:  XR RIBS LEFT W PA CHEST MIN 3 VIEWS FINDINGS: The cardiomediastinal silhouette is unremarkable  Lungs are clear  No pleural effusions  There is no pneumothorax  There is a fracture of the 7th posterior lateral rib  Impression: 1  Fracture of 7th left posterior lateral rib  2   No pneumothorax  Workstation performed: [id number]     Xr Hip/pelv 2-3 Vws Left If Performed    Result Date: 8/15/2018  Narrative: LEFT HIP INDICATION:   pain s/p fall  COMPARISON:  None VIEWS:  XR HIP/PELV 2-3 VWS LEFT  W PELVIS IF PERFORMED FINDINGS: There is no acute fracture or dislocation  Mild bilateral hip osteoarthritis is seen  No lytic or blastic osseous lesions  Soft tissues are unremarkable  The visualized lumbar spine is unremarkable  Impression: No acute osseous abnormality  Degenerative changes as described  Workstation performed: [id number]     Ct Head Without Contrast    Result Date: 8/12/2018  Narrative: CT BRAIN - WITHOUT CONTRAST INDICATION:   Multipe falls  COMPARISON:  CT head 9/21/2016 TECHNIQUE:  CT examination of the brain was performed  In addition to axial images, coronal 2D reformatted images were created and submitted for interpretation  Radiation dose length product (DLP) for this visit:  1296 87 mGy-cm     This examination, like all CT scans performed in the [facility], was performed utilizing techniques to minimize radiation dose exposure, including the use of iterative reconstruction and automated exposure control  IMAGE QUALITY:  Diagnostic  FINDINGS: PARENCHYMA:  No intracranial mass, mass effect or midline shift  No CT signs of acute infarction  No acute parenchymal hemorrhage  Age-related cortical atrophy  Periventricular white matter hypodensity which is nonspecific although most compatible with  chronic small vessel ischemic disease  VENTRICLES AND EXTRA-AXIAL SPACES:  Stable  VISUALIZED ORBITS AND PARANASAL SINUSES:  There is heterogeneous soft tissue density within the left maxillary sinus which is completely opacified and mildly expanded with chronic wall thickening  There is central increased density and calcification which could be related to inspissated secretions, colonization with fungal disease cannot be excluded  Soft tissue obscures and expands the ipsilateral ostiomeatal unit  There is opacification of some adjacent ethmoid air cells as well  Bilateral lens implants  The mastoid air cells are clear  CALVARIUM AND EXTRACRANIAL SOFT TISSUES:  There is mild posterior scalp soft tissue swelling  No calvarial fracture  Impression: No acute intracranial abnormality  Mild posterior scalp soft tissue swelling without calvarial fracture  Complete opacification and expansion of the left maxillary sinus with heterogeneous soft tissue demonstrating central increased density and calcification  Findings appear chronic although the expansion has progressed now with some opacification of the left ethmoid air cells  There is no overt bony destruction  Findings favor chronic sinusitis/mucocele although chronic fungal disease could potentially look similar  Nonemergent ENT consultation recommended   Workstation performed: [id number]       Incidental Findings:   · See imaging as above    Test Results Pending at Discharge (will require follow up):   · As per After Visit Summary     Outpatient Tests Requested:  · BMP in 1 week    Complications:  See [address] Course:     Yolanda Thomas is a 74 y o  male patient with past medical history of depression/anxiety, chronic headache, prostate CA, gait dysfunction who originally presented to the hospital on 8/12/2018 due to multiple falls which appeared mechanical in nature  Patient reported some dizziness and fogginess of the head  Patient was noted to have sodium of 124 and subsequently admitted for further evaluation workup  Hyponatremia appeared to be hypo osmolar secondary to SIADH, patient was recently started on oxcarbazepine for chronic headache  Patient was treated with tolvaptan with improvement, after discussion with patient's neurologist ox carbamazepine was discontinued  Patient was seen and followed by Nephrology and sodium gradually normalized  During hospitalization patient did have a mechanical fall while attempting to use urinal   But did not have any apparent injury  Patient was also noted to have orthostasis, initially was symptomatic  Patient was also started on Flomax by urologist recently due to urinary frequency which was discontinued and Rodolfo stockings were applied  Patient was also started on Florinef  Patient's symptomatic improved, now ambulating with physical therapy without any dizziness or limiting symptoms  Medication changes and precautions were discussed with the family in detail  Rehab was offered but family declined as patient was going to be observed by wife/ family member all the time  VNA was arranged for home PT  Patient will be discharged home  And will follow up with Urology PMD and Neurology in 1-2 weeks as scheduled  Please see above list of diagnoses and related plan for additional information         Condition at Discharge: stable     Discharge Day Visit / Exam:     Subjective:  Feels much better  Denies any confusion, disorientation or headache  Ambulated well with physical therapy using walker without any dizziness     Vitals: Blood Pressure: 109/59 (08/17/18 1335)  Pulse: 66 (08/17/18 1335)  Temperature: 97 9 °F (36 6 °C) (08/17/18 1335)  Temp Source: Oral (08/17/18 1335)  Respirations: 18 (08/17/18 1335)  Height: 5' 11" (180 3 cm) (08/12/18 2334)  Weight - Scale: 65 5 kg (144 lb 6 4 oz) (08/16/18 0600)  SpO2: 97 % (08/17/18 1335)  Exam:   Physical Exam   Constitutional: He appears well-developed  No distress  HENT:   Head: Normocephalic and atraumatic  Nose: Nose normal    Mouth/Throat: Oropharynx is clear and moist    Eyes: Conjunctivae and EOM are normal  Pupils are equal, round, and reactive to light  Neck: Normal range of motion  Neck supple  No JVD present  Cardiovascular: Normal rate, regular rhythm and normal heart sounds  Exam reveals no gallop and no friction rub  No murmur heard  Pulmonary/Chest: Effort normal and breath sounds normal  No respiratory distress  He has no wheezes  He has no rales  He exhibits no tenderness  Abdominal: Soft  Bowel sounds are normal  He exhibits no distension  There is no tenderness  There is no rebound and no guarding  Musculoskeletal: He exhibits no edema  Neurological: He is alert  No cranial nerve deficit  Skin: Skin is warm and dry  No rash noted  Psychiatric: He has a normal mood and affect  Discharge instructions/Information to patient and family:(Discharge Medications and Follow up):   See after visit summary for information provided to patient and family  Provisions for Follow-Up Care:  See after visit summary for information related to follow-up care and any pertinent home health orders  Disposition: Home with VNA and family    Planned Readmission:  No     Discharge Statement:  I spent 50 minutes discharging the patient  This time was spent on the day of discharge  I had direct contact with the patient on the day of discharge   Greater than 50% of the total time was spent examining patient, answering all patient questions, arranging and discussing plan of care with patient as well as directly providing post-discharge instructions  Additional time then spent on discharge activities  Discharge Medications:  See after visit summary for reconciled discharge medications provided to patient and family  ** Please Note:  Dictation voice to text software may have been used in the creation of this document   **

## 2018-08-17 NOTE — PROGRESS NOTES
[address] [facility] PROGRESS NOTE   Neil Monday 74 y o  male MRN: [id number]  Unit/Bed#: [address] A Encounter: [id number]  Reason for Consult: Hyponatremia    ASSESSMENT and PLAN:  1  Hypo-osmolar hyponatremia  · 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  No evidence of hypothyroidism or adrenal insufficiency  · Na normalized with Tolvaptan 7 5 mg x 2 days to as high as 136 but dropped down to 134 today  · Will place on salt tablets 1 gm BID until Trileptal is completely off      2  Orthostatic BP changes: Off Flomax now  Monitor for now  Salt tablets should help this too  3  Fall    DISPOSITION:  · Keep on NaCl 1 gm BID until Trileptal is off  · Repeat BMP once completely off salt tablets and Trileptal to assure Na stable  SUBJECTIVE / INTERVAL HISTORY:  Still orthostatic based on last nights readings  Feels well  Denies CP or SOB  OBJECTIVE:  Current Weight: Weight - Scale: 65 5 kg (144 lb 6 4 oz)  Vitals:    08/16/18 2004 08/16/18 2006 08/16/18 2118 08/17/18 0834   BP: 131/61 117/59 112/55 136/69   BP Location: Left arm Left arm Left arm Left arm   Pulse: 69 74 60 72   Resp:   18 18   Temp:   98 1 °F (36 7 °C) 98 1 °F (36 7 °C)   TempSrc:   Oral Oral   SpO2: 99% 99% 97% 100%   Weight:       Height:           Intake/Output Summary (Last 24 hours) at 08/17/18 0928  Last data filed at 08/17/18 0645   Gross per 24 hour   Intake                0 ml   Output              470 ml   Net             -470 ml     General: conscious, cooperative, no distress  Chest/Lungs: clear breath sounds  CVS: distinct heart sounds, normal rate  Abdomen: soft  Extremities: no edema       Medications:    Current Facility-Administered Medications:     acetaminophen (TYLENOL) tablet 650 mg, 650 mg, Oral, Q6H PRN, Henri Currie DO, 650 mg at 08/16/18 1931    bimatoprost (LUMIGAN) 0 01 % ophthalmic solution 1 drop, 1 drop, Both Eyes, HS, DenishaMISAEL Mesa, 1 drop at 08/16/18 5168   brimonidine (ALPHAGAN P) 0 15 % ophthalmic solution 1 drop, 1 drop, Both Eyes, BID, 1 drop at 08/17/18 0841 **AND** timolol (TIMOPTIC) 0 5 % ophthalmic solution 1 drop, 1 drop, Both Eyes, BID, Prashanth Monaco MD, 1 drop at 08/17/18 0841    busPIRone (BUSPAR) tablet 5 mg, 5 mg, Oral, BID, Claudia Rangel MD, 5 mg at 08/17/18 0840    heparin (porcine) subcutaneous injection 5,000 Units, 5,000 Units, Subcutaneous, Q8H [address], 5,000 Units at 08/17/18 0654 **AND** [CANCELED] Platelet count, , , Once, Claudia Rangel MD    ondansetron [facility]) injection 4 mg, 4 mg, Intravenous, Q6H PRN, Claudia Rangel MD, 4 mg at 08/15/18 0417    OXcarbazepine (TRILEPTAL) tablet 150 mg, 150 mg, Oral, Q12H [address], Prashanth Monaco MD, 150 mg at 08/17/18 0840    traZODone (DESYREL) tablet 50 mg, 50 mg, Oral, HS, Claudia Rangel MD, 50 mg at 08/16/18 2216    valACYclovir (VALTREX) tablet 500 mg, 500 mg, Oral, TID, Claudia Rangel MD, 500 mg at 08/17/18 0840    Laboratory Results:    Results from last 7 days  Lab Units 08/17/18  0653 08/16/18  0504 08/15/18  0552 08/14/18  0449 08/13/18  0510 08/13/18  0046 08/12/18  2026   WBC Thousand/uL  --   --  7 57  --  4 48  --  6 31   HEMOGLOBIN g/dL  --   --  12 0  --  11 8*  --  10 2*   HEMATOCRIT %  --   --  37 1  --  36 7  --  31 6*   PLATELETS Thousands/uL  --   --  217  --  194  --  172   SODIUM mmol/L 134* 136 130* 121* 128* 127* 124*   POTASSIUM mmol/L 4 2 4 0 3 8 3 8 3 9  --  3 8   CHLORIDE mmol/L 100 100 98* 90* 94*  --  93*   CO2 mmol/L 26 28 26 24 25  --  28   BUN mg/dL 17 14 11 13 21  --  28*   CREATININE mg/dL 0 92 1 05 1 01 0 63 0 74  --  0 96   CALCIUM mg/dL 9 3 9 3 9 1 8 8 8 6  --  8 5   TOTAL PROTEIN g/dL  --   --   --   --   --   --  5 8*   GLUCOSE RANDOM mg/dL 87 82 95 88 73  --  126     Work up:  8/14/18 Uric acid 2 5, Cortisol 8 5, TSH 1 210, Tawanda stim 31 8 -> 38 0  8/13/18 UOsm 462, Maximino 72, SOsm 264

## 2018-08-17 NOTE — SOCIAL WORK
Discharge ordered  Pt will be returning home [facility]  [facility] notified of discharge and AVS sent to agency

## 2018-08-17 NOTE — CASE MANAGEMENT
Continued Stay Review    Date: 8/17/18    Vital Signs: BP 99/53 (BP Location: Left arm)   Pulse 71   Temp 98 1 °F (36 7 °C) (Oral)   Resp 18   Ht 5' 11" (1 803 m)   Wt 65 5 kg (144 lb 6 4 oz)   SpO2 100%   BMI 20 14 kg/m²     GMF  BMP  FLORINEF 0 1MG QD  Medications:   Scheduled Meds:   Current Facility-Administered Medications:  acetaminophen 650 mg Oral Q6H PRN Henri Currie DO   bimatoprost 1 drop Both Eyes HS MISAEL Culver   brimonidine 1 drop Both Eyes BID Madiha Kay MD   And       timolol 1 drop Both Eyes BID Madiha Kay MD   busPIRone 5 mg Oral BID Mary Beth Artis MD   heparin (porcine) 5,000 Units Subcutaneous Q8H [address] Mary Beth Artis MD   ondansetron 4 mg Intravenous Q6H PRN Mary Beth Artis MD   OXcarbazepine 150 mg Oral Q12H Fran Camacho MD   sodium chloride 1 g Oral BID (diuretic) Celeste Montiel MD   traZODone 50 mg Oral HS Mary Beth Artis MD   valACYclovir 500 mg Oral TID Mary Beth Artis MD     Continuous Infusions:    PRN Meds:   acetaminophen    ondansetron    Abnormal Labs/Diagnostic Results:     Age/Sex: 74 y o  male     NEPHROLOGY  Still orthostatic based on last nights readings  ASSESSMENT and PLAN:  1  Hypo-osmolar hyponatremia  · 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  No evidence of hypothyroidism or adrenal insufficiency  · Na normalized with Tolvaptan 7 5 mg x 2 days to as high as 136 but dropped down to 134 today  · Will place on salt tablets 1 gm BID until Trileptal is completely off       2  Orthostatic BP changes: Off Flomax now  Monitor for now  Salt tablets should help this too  3  Fall     DISPOSITION:  · Keep on NaCl 1 gm BID until Trileptal is off     · Repeat BMP once completely off salt tablets and Trileptal to assure Na stable

## 2018-08-17 NOTE — PLAN OF CARE
DISCHARGE PLANNING     Discharge to home or other facility with appropriate resources Progressing        DISCHARGE PLANNING - CARE MANAGEMENT     Discharge to post-acute care or home with appropriate resources Progressing        METABOLIC, FLUID AND ELECTROLYTES - ADULT     Electrolytes maintained within normal limits Progressing        MUSCULOSKELETAL - ADULT     Maintain or return mobility to safest level of function Progressing        PAIN - ADULT     Verbalizes/displays adequate comfort level or baseline comfort level Progressing        Potential for Falls     Patient will remain free of falls Progressing        Prexisting or High Potential for Compromised Skin Integrity     Skin integrity is maintained or improved Progressing        SAFETY ADULT     Maintain or return to baseline ADL function Progressing     Patient will remain free of falls Progressing

## 2018-08-18 ENCOUNTER — TELEPHONE (OUTPATIENT)
Dept: FAMILY MEDICINE CLINIC | Facility: CLINIC | Age: 73
End: 2018-08-18

## 2018-08-18 NOTE — TELEPHONE ENCOUNTER
Patient was in [facility] 812-8/17 for headaches/dizziness  Patient is scheduled with Dr Danii Ball 8/24 @ 4:00 for DESHAWN

## 2018-08-20 ENCOUNTER — TELEPHONE (OUTPATIENT)
Dept: FAMILY MEDICINE CLINIC | Facility: CLINIC | Age: 73
End: 2018-08-20

## 2018-08-20 NOTE — TELEPHONE ENCOUNTER
Wife called and said Chirag Mitchell is doing much better now that he is out of the hospital  He may need a referral to see a nephrologist the one they sent him to does not take their insurance  She wanted to know if Chirag Mitchell has had an ESR test, I did not see one  Also do you think he should follow up on the abnormal lyme? She has an appointment but doesn't want to spend the money if he doesn't need it  Please advise  tc/cma

## 2018-08-20 NOTE — CASE MANAGEMENT
Auth:     [id number]       Notification of Discharge  This is a Notification of Discharge from our facility [address]  Please be advised that this patient has been discharge from our facility  Below you will find the admission and discharge date and time including the patients disposition  PRESENTATION DATE: 8/12/2018  8:11 PM  IP ADMISSION DATE: 8/12/18 2244  DISCHARGE DATE: 8/17/2018  6:54 PM  DISPOSITION: Home with [address] [place] [address] in the [facility] by [facility] Utilization Review Department  Phone: [phone]; Fax [phone]  ATTENTION: The Network Utilization Review Department is now centralized for our 9 Facilities  Make a note that we have a new phone and fax numbers for our Department  Please call with any questions or concerns to [phone] and carefully follow the prompts so that you are directed to the right person  All voicemails are confidential  Fax any determinations, approvals, denials, and requests for initial or continue stay review clinical to [phone]  Due to HIGH CALL volume, it would be easier if you could please send faxed requests to expedite your requests and in part, help us provide discharge notifications faster

## 2018-08-22 DIAGNOSIS — E87.1 HYPONATREMIA: Primary | ICD-10-CM

## 2018-08-23 ENCOUNTER — TELEPHONE (OUTPATIENT)
Dept: CARDIOLOGY CLINIC | Facility: CLINIC | Age: 73
End: 2018-08-23

## 2018-08-23 DIAGNOSIS — R29.6 FREQUENT FALLS: ICD-10-CM

## 2018-08-23 DIAGNOSIS — I95.1 ORTHOSTASIS: ICD-10-CM

## 2018-08-23 DIAGNOSIS — E87.1 HYPONATREMIA: ICD-10-CM

## 2018-08-23 RX ORDER — FLUDROCORTISONE ACETATE 0.1 MG/1
0.1 TABLET ORAL DAILY
Qty: 30 TABLET | Refills: 5 | Status: SHIPPED | OUTPATIENT
Start: 2018-08-23 | End: 2019-01-18 | Stop reason: SDUPTHER

## 2018-08-23 NOTE — TELEPHONE ENCOUNTER
LUDWIG Spoke with tutu, pt's wife she states he has enedina tomorrow 8/24 with you, and they will keep his specialist appointment  If you think it is a good idea, pt is willing to start Lyme abx - he is finishing his valtrex for shingles today

## 2018-08-23 NOTE — TELEPHONE ENCOUNTER
Please inform pt had both sed rate (=2) and CRP (=0 8) in May  His Lyme titer overall was NEGATIVE--the titers did not meet criteria for +test and the Western Blot confirmatory test was also negative  Always a chance that test is falsely negative-could consider just treating clinically w extended antibiotic or repeat test but would have specialist evaluations first as planned

## 2018-09-04 LAB
EGFR AFRICAN AMERICAN (HISTORICAL): 76
EXT GLUCOSE BLD: 88
EXTERNAL BUN: 28
EXTERNAL CALCIUM: 9.6
EXTERNAL CHLORIDE: 101
EXTERNAL CO2: 22
EXTERNAL CREATININE: 1.1
EXTERNAL EGFR: 65
EXTERNAL POTASSIUM: 3.8
EXTERNAL SODIUM: 140

## 2018-09-06 ENCOUNTER — TELEPHONE (OUTPATIENT)
Dept: FAMILY MEDICINE CLINIC | Facility: CLINIC | Age: 73
End: 2018-09-06

## 2018-09-06 NOTE — TELEPHONE ENCOUNTER
Dipti Cagle wanted to let you know that the think the patient would benefit from taking Zoloft   TY

## 2018-09-07 DIAGNOSIS — G44.209 TENSION-TYPE HEADACHE, NOT INTRACTABLE, UNSPECIFIED CHRONICITY PATTERN: ICD-10-CM

## 2018-09-07 DIAGNOSIS — M19.90 ARTHRITIS: ICD-10-CM

## 2018-09-07 RX ORDER — CELECOXIB 200 MG/1
200 CAPSULE ORAL DAILY
Qty: 30 CAPSULE | Refills: 0 | Status: SHIPPED | OUTPATIENT
Start: 2018-09-07 | End: 2018-09-26 | Stop reason: SDUPTHER

## 2018-09-07 NOTE — TELEPHONE ENCOUNTER
Please let pt know that I sent in refill for the celebrex  If he does not feel that the Buspar and/or trazodone is helping I can switch him to the zoloft--let me know-    Also -Has he seen the neurologist yet?    If so can you call for the consult report--thanks

## 2018-09-07 NOTE — TELEPHONE ENCOUNTER
Lance Ferguson from visiting [facility] called   Patient is taking both buspar and trazadone, he has a lot of anxiety and nerve pain that's why she thought of zoloft  He needs a refill of celebrex  Also he went to chiropractor for 1st time today when he got home his heart rate was 48-50, no other sxs  tc/cma

## 2018-09-08 ENCOUNTER — TELEPHONE (OUTPATIENT)
Dept: FAMILY MEDICINE CLINIC | Facility: CLINIC | Age: 73
End: 2018-09-08

## 2018-09-08 DIAGNOSIS — F41.9 ANXIETY AND DEPRESSION: Primary | ICD-10-CM

## 2018-09-08 DIAGNOSIS — F32.A ANXIETY AND DEPRESSION: Primary | ICD-10-CM

## 2018-09-08 RX ORDER — SERTRALINE HYDROCHLORIDE 25 MG/1
25 TABLET, FILM COATED ORAL DAILY
Qty: 30 TABLET | Refills: 3 | Status: SHIPPED | OUTPATIENT
Start: 2018-09-08 | End: 2018-10-10 | Stop reason: DRUGHIGH

## 2018-09-08 NOTE — TELEPHONE ENCOUNTER
Pt notified about refill  Yes he  would like to try the zoloft  He went to neurologists I will get the note  He went to chiropractor, has something with his neck from years of painting still being treated there  He is going to  Dr Ada Watkins, kidney  Sept 24th  Made an appointment with you 9/26  tc/cma

## 2018-09-08 NOTE — TELEPHONE ENCOUNTER
Call Dr Didier Moe neuro in [facility] for consult, Dr Jimenez How  DR Leonardo Diaz ( Kidney) 9/24  tc/cma

## 2018-09-10 ENCOUNTER — TELEPHONE (OUTPATIENT)
Dept: FAMILY MEDICINE CLINIC | Facility: CLINIC | Age: 73
End: 2018-09-10

## 2018-09-20 ENCOUNTER — TELEPHONE (OUTPATIENT)
Dept: FAMILY MEDICINE CLINIC | Facility: CLINIC | Age: 73
End: 2018-09-20

## 2018-09-20 NOTE — TELEPHONE ENCOUNTER
Called patient and spoke with wife Miri Castro  She is not sure what was needed for appointment with Dr Sourav Medeiros  Called Dr Alix Rodriguez office - they are closed  I did a referral in [address] [place] [address], which states insurance referral is not needed

## 2018-09-20 NOTE — TELEPHONE ENCOUNTER
Dr Mitra Espinoza,     Patient needs a pre authorization for the nephrology office of Dr Ada Watkins

## 2018-09-21 NOTE — TELEPHONE ENCOUNTER
Called Dr Lydia Ortiz office and spoke to [facility]  She clarified that insurance referral was being requested  I advised her one was done in [place], but it  states that it is not required for [facility]

## 2018-09-26 ENCOUNTER — OFFICE VISIT (OUTPATIENT)
Dept: FAMILY MEDICINE CLINIC | Facility: CLINIC | Age: 73
End: 2018-09-26
Payer: COMMERCIAL

## 2018-09-26 VITALS
TEMPERATURE: 96.4 F | BODY MASS INDEX: 19.43 KG/M2 | DIASTOLIC BLOOD PRESSURE: 68 MMHG | HEIGHT: 71 IN | HEART RATE: 56 BPM | RESPIRATION RATE: 14 BRPM | SYSTOLIC BLOOD PRESSURE: 98 MMHG | WEIGHT: 138.8 LBS

## 2018-09-26 DIAGNOSIS — M19.90 ARTHRITIS: Primary | ICD-10-CM

## 2018-09-26 DIAGNOSIS — F32.A DEPRESSION, UNSPECIFIED DEPRESSION TYPE: ICD-10-CM

## 2018-09-26 DIAGNOSIS — G47.00 INSOMNIA, UNSPECIFIED TYPE: ICD-10-CM

## 2018-09-26 DIAGNOSIS — Z23 NEED FOR INFLUENZA VACCINATION: ICD-10-CM

## 2018-09-26 PROCEDURE — G0008 ADMIN INFLUENZA VIRUS VAC: HCPCS

## 2018-09-26 PROCEDURE — 90662 IIV NO PRSV INCREASED AG IM: CPT

## 2018-09-26 PROCEDURE — 99214 OFFICE O/P EST MOD 30 MIN: CPT | Performed by: FAMILY MEDICINE

## 2018-09-26 RX ORDER — CELECOXIB 200 MG/1
200 CAPSULE ORAL DAILY
Qty: 90 CAPSULE | Refills: 2 | Status: SHIPPED | OUTPATIENT
Start: 2018-09-26 | End: 2018-10-10 | Stop reason: SDUPTHER

## 2018-09-26 RX ORDER — TRAZODONE HYDROCHLORIDE 50 MG/1
50 TABLET ORAL
Qty: 90 TABLET | Refills: 2 | Status: SHIPPED | OUTPATIENT
Start: 2018-09-26 | End: 2018-10-10 | Stop reason: ALTCHOICE

## 2018-10-06 NOTE — PROGRESS NOTES
[facility]  [address] [facility] [address]  Phone: [phone]  Fax: [phone]                  Elke Laws   2017 3:00 PM   Office Visit    Description:  Female : 10/15/1936   Provider:  America Laws NP   Department:  [facility]           Diagnoses this Visit        Comments    Essential hypertension    -  Primary     Coronary artery disease involving native coronary artery of native heart without angina pectoris                To Do List           Future Appointments        Provider Department Dept Phone    2017 11:30 AM Michael Valentine NP [facility] [phone]    2017 9:40 AM Isidra Benavidez MD [facility] [phone]      Goals (5 Years of Data)     None      Follow-Up and Disposition     Return if symptoms worsen or fail to improve, for keep routine follow up.      [facility] On Call     [facility] On Call Nurse Care Line - / Assistance  Unless otherwise directed by your provider, please contact [facility], our nurse care line that is available for / assistance.     Registered nurses in the [facility] On Call Center provide: appointment scheduling, clinical advisement, health education, and other advisory services.  Call: [phone] (toll free)               Medications           Message regarding Medications     Verify the changes and/or additions to your medication regime listed below are the same as discussed with your clinician today.  If any of these changes or additions are incorrect, please notify your healthcare provider.             Verify that the below list of medications is an accurate representation of the medications you are currently taking.  If none reported, the list may be blank. If incorrect, please contact your healthcare provider. Carry this list with you in case of emergency.           Current Medications     acetaminophen (TYLENOL) 500 MG tablet Take 1 tablet (500  Assessment/Plan     Diagnoses and all orders for this visit:    Need for influenza vaccination  -     influenza vaccine, 5563-8599, high-dose, PF 0 5 mL, for patients 65 yr+ (FLUZONE HIGH-DOSE)    Arthritis  -     celecoxib (CeleBREX) 200 mg capsule; Take 1 capsule (200 mg total) by mouth daily As needed for headache, joint or muscle pain    Depression, unspecified depression type  Comments:  monitor on sertraline and trazodone  Orders:  -     traZODone (DESYREL) 50 mg tablet; Take 1 tablet (50 mg total) by mouth daily at bedtime    Insomnia, unspecified type  Comments:  rx Trazodone  Orders:  -     traZODone (DESYREL) 50 mg tablet; Take 1 tablet (50 mg total) by mouth daily at bedtime            Subjective:      Patient ID: Unique Araya is a 74 y o  male  Chief Complaint   Patient presents with    Multiple Concerns    Results     BW    Medication Refill     celebrex    Immunizations     flu       73 yo pt in w wife, Minnie Morrison, for follow-up and flu shot  Rpt BMP wnl  Celebrex helping some w arthritic pains and h/as--needs refill  Mood improved w sertraline but cont to have some problems w sleep  Saw neurologist--no new meds added--need t obtain consult for review  The following portions of the patient's history were reviewed and updated as appropriate: allergies, current medications, past family history, past medical history, past social history, past surgical history and problem list      Review of Systems   Constitutional: Negative  Respiratory: Negative  Cardiovascular: Negative  Gastrointestinal: Negative  Psychiatric/Behavioral: Positive for decreased concentration and sleep disturbance  The patient is nervous/anxious            Objective:    BP 98/68 (BP Location: Left arm, Patient Position: Sitting, Cuff Size: Standard)   Pulse 56   Temp (!) 96 4 °F (35 8 °C)   Resp 14   Ht 5' 11" (1 803 m)   Wt 63 kg (138 lb 12 8 oz)   BMI 19 36 kg/m²        Physical Exam   Constitutional: "mg total) by mouth 2 (two) times daily.    albuterol 90 mcg/actuation inhaler Inhale 2 puffs into the lungs 4 (four) times daily.    albuterol sulfate 2.5 mg/0.5 mL Nebu Take 2.5 mg by nebulization every 4 (four) hours as needed.    apixaban (ELIQUIS) 5 mg Tab Take 1 tablet (5 mg total) by mouth 2 (two) times daily.    ASCORBATE CALCIUM (VITAMIN C ORAL) Take by mouth once daily.    aspirin (ECOTRIN) 81 MG EC tablet Take 1 tablet (81 mg total) by mouth once daily.    compressor, for nebulizer Lara Compressor use as directed by albuterol use.    DOCOSAHEXANOIC ACID/EPA (FISH OIL ORAL) Take by mouth once daily.    flecainide (TAMBOCOR) 50 MG Tab Take 1 tablet (50 mg total) by mouth every 12 (twelve) hours.    fluticasone (FLOVENT HFA) 110 mcg/actuation inhaler Inhale 1 puff into the lungs 2 (two) times daily.    furosemide (LASIX) 40 MG tablet Take 1 tablet (40 mg total) by mouth once daily.    isosorbide mononitrate (IMDUR) 30 MG 24 hr tablet TAKE 1 TABLET(30 MG) BY MOUTH EVERY DAY    loratadine (CLARITIN) 10 mg tablet Take 1 tablet (10 mg total) by mouth once daily.    metoprolol succinate (TOPROL-XL) 100 MG 24 hr tablet Take 1 tablet (100 mg total) by mouth once daily.    pravastatin (PRAVACHOL) 40 MG tablet Take 1 tablet (40 mg total) by mouth once daily.    valsartan (DIOVAN) 160 MG tablet Take 1 tablet (160 mg total) by mouth once daily.           Clinical Reference Information           Your Vitals Were     BP Pulse Temp Height    140/64 (BP Location: Right arm, Patient Position: Sitting, BP Method: Manual) 54 97.3 °F (36.3 °C) (Tympanic) 4' 10" (1.473 m)    Weight Last Period BMI    90.3 kg (199 lb 1.2 oz) 12/13/1973 41.61 kg/m2      Blood Pressure          Most Recent Value    BP  (!)  140/64      Allergies as of 4/17/2017     Iodine And Iodide Containing Products      Immunizations Administered on Date of Encounter - 4/17/2017     None      [facility] Sign-Up     Activating your [facility] account is as easy as " Thin, NAD   Eyes: Conjunctivae are normal    Neck: Neck supple  Cardiovascular: Normal rate, regular rhythm and intact distal pulses  Murmur heard  Pulmonary/Chest: Effort normal and breath sounds normal  No respiratory distress  Abdominal: Soft  Bowel sounds are normal  There is no tenderness  Musculoskeletal: He exhibits tenderness  Neurological: He is alert  No cranial nerve deficit  oriented to self,place, time of year   Skin: Skin is warm and dry  No rash noted  Psychiatric: His mood appears anxious  He is not actively hallucinating  He expresses no suicidal ideation  Anxious, denies a/v halluc  Nursing note and vitals reviewed  Labs;  Labs in chart were reviewed        Leny Magana MD 1-2-3!     1) Visit [email or web address], select Sign Up Now, enter this activation code and your date of birth, then select Next.  [email or web address]  Expires: 5/28/2017  2:02 PM      2) Create a username and password to use when you visit [facility] in the future and select a security question in case you lose your password and select Next.    3) Enter your e-mail address and click Sign Up!    Additional Information  If you have questions, please e-mail [email or web address] or call [phone] to talk to our [email or web address] staff. Remember, [facility] is NOT to be used for urgent needs. For medical emergencies, dial 911.         Instructions    Try 1 capful miralax daily for constipation. Can also try with stool softener.        Language Assistance Services     ATTENTION: Language assistance services are available, free of charge. Please call [phone].      ATENCIÓN: Si habla josé, tiene a gong disposición servicios gratuitos de asistencia lingüística. Llame al [phone].     SRAVAN Ý: N?u b?n nói Ti?ng Vi?t, có các d?ch v? h? tr? ngôn ng? mi?n phí dành cho b?n. G?i s? [phone].         [facility] complies with applicable Federal civil rights laws and does not discriminate on the basis of race, color, national origin, age, disability, or sex.

## 2018-10-08 ENCOUNTER — TELEPHONE (OUTPATIENT)
Dept: FAMILY MEDICINE CLINIC | Facility: CLINIC | Age: 73
End: 2018-10-08

## 2018-10-09 ENCOUNTER — TELEPHONE (OUTPATIENT)
Dept: FAMILY MEDICINE CLINIC | Facility: CLINIC | Age: 73
End: 2018-10-09

## 2018-10-09 NOTE — TELEPHONE ENCOUNTER
Tried several times to reach pt but call not going thru--please call again to see how pt and wife are doing-if pt with behavorial change advise he be taken to ER for evaluation--if pt cannot drive him she should call EMS for transport for change in mental status-

## 2018-10-09 NOTE — TELEPHONE ENCOUNTER
Dr Anahi Alex pt wife called to say pt is very nasty today pt wife wants to know what to do pt is slamming doors and throwing things

## 2018-10-10 ENCOUNTER — OFFICE VISIT (OUTPATIENT)
Dept: FAMILY MEDICINE CLINIC | Facility: CLINIC | Age: 73
End: 2018-10-10
Payer: COMMERCIAL

## 2018-10-10 ENCOUNTER — TELEPHONE (OUTPATIENT)
Dept: FAMILY MEDICINE CLINIC | Facility: CLINIC | Age: 73
End: 2018-10-10

## 2018-10-10 DIAGNOSIS — F32.A ANXIETY AND DEPRESSION: Primary | ICD-10-CM

## 2018-10-10 DIAGNOSIS — F41.9 ANXIETY AND DEPRESSION: Primary | ICD-10-CM

## 2018-10-10 DIAGNOSIS — I35.0 AORTIC STENOSIS, MODERATE: ICD-10-CM

## 2018-10-10 DIAGNOSIS — G47.00 INSOMNIA, UNSPECIFIED TYPE: ICD-10-CM

## 2018-10-10 DIAGNOSIS — M19.90 ARTHRITIS: ICD-10-CM

## 2018-10-10 PROCEDURE — 99214 OFFICE O/P EST MOD 30 MIN: CPT | Performed by: FAMILY MEDICINE

## 2018-10-10 RX ORDER — CELECOXIB 200 MG/1
200 CAPSULE ORAL DAILY
Qty: 90 CAPSULE | Refills: 0 | Status: SHIPPED | OUTPATIENT
Start: 2018-10-10 | End: 2018-11-14 | Stop reason: SDUPTHER

## 2018-10-10 NOTE — TELEPHONE ENCOUNTER
Dr Heide Johnson    Patient's wife called to update you on meds he is taking: celebrex 200 mg 1 caps by moth when needed, trazadone 50 mg 1 tab daily by mouth at bedtime, zoloft was taking 25 mg, and you increased it to 50 mg today 1 x daily, fludrocortisone 0 1 mg 1 x daily, buspirone 5 mg 1 tab by mouth 2 x daily, combigan 0 2%/0 5% eyedrop 1 drop in each eye in am and pm, lumigan 7 5 ml 1 drope in each eye at bedtime

## 2018-10-11 NOTE — TELEPHONE ENCOUNTER
Please advise that if pt starts to do better on the increased zoloft(sertraline) he can then stop the [employer]

## 2018-10-12 NOTE — TELEPHONE ENCOUNTER
Spoke to pt and his wife   they were verbally fighting back and forth with eachother  wife said patient is getting nasty throwing things   Will let Dr Gus Hook know about the conversation  tc/cma

## 2018-10-13 NOTE — TELEPHONE ENCOUNTER
Reviewed-saw pt and wife earlier in week and disc pt and wife's concerns re:same--med inc in zoloft made--will sched follow-up eval next  need to readdress--will also seek input from neuro and psych regarding tx recommendations-  If sxs worsen prior pt will need ER crisis intervention---

## 2018-10-14 VITALS
WEIGHT: 140.8 LBS | BODY MASS INDEX: 19.71 KG/M2 | DIASTOLIC BLOOD PRESSURE: 60 MMHG | RESPIRATION RATE: 14 BRPM | HEART RATE: 68 BPM | TEMPERATURE: 97 F | HEIGHT: 71 IN | SYSTOLIC BLOOD PRESSURE: 90 MMHG

## 2018-10-15 ENCOUNTER — TELEPHONE (OUTPATIENT)
Dept: FAMILY MEDICINE CLINIC | Facility: CLINIC | Age: 73
End: 2018-10-15

## 2018-10-15 NOTE — TELEPHONE ENCOUNTER
Pt wife called to say pt is a lot better  And she is putting up with him and his behavior   Pt fell over the weekend , neighbor help to get him up pt did not hurt himself

## 2018-10-15 NOTE — PROGRESS NOTES
Assessment/Plan:   Diagnoses and all orders for this visit:    Anxiety and depression  Comments:  cont to sturggl w sxs of anxiety/depression  will try increasing zolof to 50mg and   d/c Buspar and Trazodone use  Orders:  -     Discontinue: sertraline (ZOLOFT) 50 mg tablet; Take 1 tablet (50 mg total) by mouth daily  -     sertraline (ZOLOFT) 50 mg tablet; Take 1 tablet (50 mg total) by mouth daily    Insomnia, unspecified type  Comments:  monitor w inc zoloft and d/c of trazodone    Arthritis  Comments:  refill Celebrex given  Orders:  -     celecoxib (CeleBREX) 200 mg capsule; Take 1 capsule (200 mg total) by mouth daily As needed for headache, joint or muscle pain    Aortic stenosis, moderate  Comments:  cont cardio folow-up  Subjective:      Patient ID: Rosalia Figures is a 74 y o  male  Chief Complaint   Patient presents with    Follow-up     would like to discuss Dr Barrios Expose     pt has soto d a change in behavior  again throwing things and yeliing       49-year-old patient with wife-Melissa- for follow-up  Continues to struggle with his anxiety /depression  Wife feels patient is becoming more easily agitated  Patient thinks it is because wife keeps giving him various chores around the house but he feels too tired to be able to do all at once  Headaches have subsided some-has not been back to Neurology  Has upcoming appointment with Nephrology  Needs to sched with cardio  BP low end-Denies cp or inc shortness of breath, cont w fatigue-still not sleeping well  The following portions of the patient's history were reviewed and updated as appropriate: allergies, current medications, past family history, past medical history, past social history, past surgical history and problem list      Review of Systems   Constitutional: Positive for fatigue  Negative for fever  Respiratory: Negative for cough  Cardiovascular: Negative for chest pain     Gastrointestinal: Negative for abdominal pain    Musculoskeletal: Positive for arthralgias and myalgias  Neurological: Positive for headaches  Negative for tremors and seizures  Psychiatric/Behavioral: Positive for agitation, decreased concentration, dysphoric mood and sleep disturbance  Negative for hallucinations  The patient is nervous/anxious  Objective:    BP 90/60 (BP Location: Left arm, Patient Position: Sitting, Cuff Size: Standard)   Pulse 68   Temp (!) 97 °F (36 1 °C)   Resp 14   Ht 5' 11" (1 803 m)   Wt 63 9 kg (140 lb 12 8 oz)   BMI 19 64 kg/m²        Physical Exam   Constitutional: He appears distressed  Neck: Neck supple  Cardiovascular: Normal rate, regular rhythm and intact distal pulses  Murmur heard  Pulmonary/Chest: Effort normal and breath sounds normal  No respiratory distress  Abdominal: Soft  Bowel sounds are normal  There is no tenderness  Neurological: He is alert  Oriented to self and place   Skin: Skin is warm and dry  No rash noted  Psychiatric:   Anxious, denies a/v halluc , cooperative w exam   Nursing note and vitals reviewed  Labs;  Labs in chart were reviewed        Lucia Chavez MD

## 2018-10-22 ENCOUNTER — OFFICE VISIT (OUTPATIENT)
Dept: NEPHROLOGY | Facility: CLINIC | Age: 73
End: 2018-10-22
Payer: COMMERCIAL

## 2018-10-22 VITALS
BODY MASS INDEX: 19.32 KG/M2 | HEIGHT: 71 IN | SYSTOLIC BLOOD PRESSURE: 118 MMHG | DIASTOLIC BLOOD PRESSURE: 60 MMHG | WEIGHT: 138 LBS

## 2018-10-22 DIAGNOSIS — I10 BENIGN HYPERTENSION: Primary | ICD-10-CM

## 2018-10-22 DIAGNOSIS — I95.1 ORTHOSTASIS: ICD-10-CM

## 2018-10-22 DIAGNOSIS — E87.1 HYPONATREMIA: ICD-10-CM

## 2018-10-22 PROCEDURE — 99213 OFFICE O/P EST LOW 20 MIN: CPT | Performed by: INTERNAL MEDICINE

## 2018-10-22 NOTE — LETTER
October 22, 2018     Víctor Hernandez MD  [address] [facility] [address]    Patient: Jewels Deras   YOB: 1945   Date of Visit: 10/22/2018       Dear Dr Brandon Wang: Thank you for referring Sarai Jean to me for evaluation  Below are my notes for this consultation  If you have questions, please do not hesitate to call me  I look forward to following your patient along with you  Sincerely,        Joo Duncan MD        CC: Hugo Null, [address] [place] [address], MD  10/22/2018  9:08 AM  Sign at close encounter  [address] [place] [address] 74 y o  male MRN: [id number]  DATE: 10/22/18  Reason for visit: Continued evaluation of hyponatremia    ASSESSMENT & PLAN:  1  Hypo-osmolar hyponatremia  · 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  No evidence of hypothyroidism or adrenal insufficiency  · Trileptal stopped during August 2018 hospital stay  · Na has normalized with Na level of 140 on 8/30/18  Will repeat BMP now to assure that Na has remained stable  · Currently not on any salt tablets or fluid restriction  2  Orthostatic BP changes:   · Currently asymptomatic  BP change from sitting to standing not significantly high but will keep on Florinef due to history of falls  Patient Instructions   No change to medications  Repeat blood work now and in 6 months  Follow up in 6 months  SUBJECTIVE / INTERVAL HISTORY:  Sarai Jean is a 70-year-old  gentleman who I am seeing in the office for the 1st time after he was recently admitted to [facility] between August 12 and August 17, 2018  During the admission to the hospital, we saw him for hyponatremia which we felt was due to SIADH after Trileptal was recently started  The Trileptal was discontinued during the hospital stay and he was started on salt tablets with improvement in his sodium levels    Additionally, we noted orthostatic blood pressure changes during the hospital stay and he was placed on Florinef  Repeat Na levels as an outpatient remained normal       PMH/PSH: HTN, depression, aortic stenosis, falls, headache  Previous work up:   8/14/18: TSH 1 210  Cortisol 8 5 (Tawanda stim 31 8 at 30 mins, 38 0 at 60 mins)    ALLERGIES: No Known Allergies    REVIEW OF SYSTEMS:  Review of Systems   Constitutional: Negative for appetite change, chills and fever  2 falls since discharge   Respiratory: Negative for cough and shortness of breath  Cardiovascular: Negative for chest pain and leg swelling  Gastrointestinal: Positive for constipation  Negative for abdominal pain, diarrhea, nausea and vomiting  Genitourinary: Negative for dysuria and hematuria  Musculoskeletal: Negative for arthralgias  Neurological: Negative for dizziness and light-headedness  OBJECTIVE:  /60 (BP Location: Right arm, Patient Position: Standing, Cuff Size: Standard)   Ht 5' 11" (1 803 m)   Wt 62 6 kg (138 lb)   BMI 19 25 kg/m²    Current Weight:   Body mass index is 19 25 kg/m²  Physical Exam   Constitutional: He is oriented to person, place, and time  He appears well-developed and well-nourished  No distress  HENT:   Head: Normocephalic and atraumatic  Mouth/Throat: Mucous membranes are normal    Eyes: Conjunctivae are normal  No scleral icterus  Neck: Neck supple  No JVD present  Cardiovascular: Normal rate, regular rhythm and normal heart sounds  Pulmonary/Chest: Effort normal and breath sounds normal  No respiratory distress  Abdominal: Soft  Bowel sounds are normal    Musculoskeletal: He exhibits edema (mild bipedal edema)  Neurological: He is alert and oriented to person, place, and time  Skin: Skin is warm and dry  Psychiatric: He has a normal mood and affect   His behavior is normal      Medications:  Current Outpatient Prescriptions:     bimatoprost (LUMIGAN) 0 01 % ophthalmic drops, Administer 1 drop to both eyes daily at bedtime  , Disp: , Rfl:    brimonidine-timolol (COMBIGAN) 0 2-0 5 %, Administer 1 drop to both eyes 2 (two) times a day, Disp: , Rfl:     celecoxib (CeleBREX) 200 mg capsule, Take 1 capsule (200 mg total) by mouth daily As needed for headache, joint or muscle pain, Disp: 90 capsule, Rfl: 0    fludrocortisone (FLORINEF) 0 1 mg tablet, Take 1 tablet (0 1 mg total) by mouth daily, Disp: 30 tablet, Rfl: 5    sertraline (ZOLOFT) 50 mg tablet, Take 1 tablet (50 mg total) by mouth daily, Disp: 90 tablet, Rfl: 0    Laboratory Results:  Results for orders placed or performed in visit on 96/36/20   Basic metabolic panel   Result Value Ref Range    SODIUM 140     POTASSIUM 3 8     CHLORIDE 101     CO2 22     BUN 28     CREATININE 1 10     Glucose 88     EXTERNAL CALCIUM 9 6     EXTERNAL EGFR 65     eGFR  76

## 2018-10-22 NOTE — PROGRESS NOTES
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  No evidence of hypothyroidism or adrenal insufficiency  · Trileptal stopped during August 2018 hospital stay  · Na has normalized with Na level of 140 on 8/30/18  Will repeat BMP now to assure that Na has remained stable  · Currently not on any salt tablets or fluid restriction  2  Orthostatic BP changes:   · Currently asymptomatic  BP change from sitting to standing not significantly high but will keep on Florinef due to history of falls  Patient Instructions   No change to medications  Repeat blood work now and in 6 months  Follow up in 6 months  SUBJECTIVE / INTERVAL HISTORY:  Gasper Mcclendon is a 41-year-old  gentleman who I am seeing in the office for the 1st time after he was recently admitted to [facility] between August 12 and August 17, 2018  During the admission to the hospital, we saw him for hyponatremia which we felt was due to SIADH after Trileptal was recently started  The Trileptal was discontinued during the hospital stay and he was started on salt tablets with improvement in his sodium levels  Additionally, we noted orthostatic blood pressure changes during the hospital stay and he was placed on Florinef  Repeat Na levels as an outpatient remained normal       PMH/PSH: HTN, depression, aortic stenosis, falls, headache  Previous work up:   8/14/18: TSH 1 210  Cortisol 8 5 (Tawanda stim 31 8 at 30 mins, 38 0 at 60 mins)    ALLERGIES: No Known Allergies    REVIEW OF SYSTEMS:  Review of Systems   Constitutional: Negative for appetite change, chills and fever  2 falls since discharge   Respiratory: Negative for cough and shortness of breath  Cardiovascular: Negative for chest pain and leg swelling  Gastrointestinal: Positive for constipation  Negative for abdominal pain, diarrhea, nausea and vomiting  Genitourinary: Negative for dysuria and hematuria  Musculoskeletal: Negative for arthralgias  Neurological: Negative for dizziness and light-headedness  OBJECTIVE:  /60 (BP Location: Right arm, Patient Position: Standing, Cuff Size: Standard)   Ht 5' 11" (1 803 m)   Wt 62 6 kg (138 lb)   BMI 19 25 kg/m²   Current Weight:   Body mass index is 19 25 kg/m²  Physical Exam   Constitutional: He is oriented to person, place, and time  He appears well-developed and well-nourished  No distress  HENT:   Head: Normocephalic and atraumatic  Mouth/Throat: Mucous membranes are normal    Eyes: Conjunctivae are normal  No scleral icterus  Neck: Neck supple  No JVD present  Cardiovascular: Normal rate, regular rhythm and normal heart sounds  Pulmonary/Chest: Effort normal and breath sounds normal  No respiratory distress  Abdominal: Soft  Bowel sounds are normal    Musculoskeletal: He exhibits edema (mild bipedal edema)  Neurological: He is alert and oriented to person, place, and time  Skin: Skin is warm and dry  Psychiatric: He has a normal mood and affect   His behavior is normal      Medications:  Current Outpatient Prescriptions:     bimatoprost (LUMIGAN) 0 01 % ophthalmic drops, Administer 1 drop to both eyes daily at bedtime  , Disp: , Rfl:     brimonidine-timolol (COMBIGAN) 0 2-0 5 %, Administer 1 drop to both eyes 2 (two) times a day, Disp: , Rfl:     celecoxib (CeleBREX) 200 mg capsule, Take 1 capsule (200 mg total) by mouth daily As needed for headache, joint or muscle pain, Disp: 90 capsule, Rfl: 0    fludrocortisone (FLORINEF) 0 1 mg tablet, Take 1 tablet (0 1 mg total) by mouth daily, Disp: 30 tablet, Rfl: 5    sertraline (ZOLOFT) 50 mg tablet, Take 1 tablet (50 mg total) by mouth daily, Disp: 90 tablet, Rfl: 0    Laboratory Results:  Results for orders placed or performed in visit on 13/14/98   Basic metabolic panel   Result Value Ref Range    SODIUM 140     POTASSIUM 3 8     CHLORIDE 101     CO2 22     BUN 28     CREATININE 1 10     Glucose 88     EXTERNAL CALCIUM 9 6     EXTERNAL EGFR 65     eGFR  76

## 2018-10-24 ENCOUNTER — TELEPHONE (OUTPATIENT)
Dept: FAMILY MEDICINE CLINIC | Facility: CLINIC | Age: 73
End: 2018-10-24

## 2018-10-24 LAB
BUN SERPL-MCNC: 23 MG/DL (ref 8–27)
BUN/CREAT SERPL: 26 (ref 10–24)
CALCIUM SERPL-MCNC: 9.4 MG/DL (ref 8.6–10.2)
CHLORIDE SERPL-SCNC: 98 MMOL/L (ref 96–106)
CO2 SERPL-SCNC: 26 MMOL/L (ref 20–29)
CREAT SERPL-MCNC: 0.87 MG/DL (ref 0.76–1.27)
GLUCOSE SERPL-MCNC: 87 MG/DL (ref 65–99)
LABCORP COMMENT: NORMAL
POTASSIUM SERPL-SCNC: 4.3 MMOL/L (ref 3.5–5.2)
SL AMB EGFR AFRICAN AMERICAN: 98 ML/MIN/1.73
SL AMB EGFR NON AFRICAN AMERICAN: 84 ML/MIN/1.73
SODIUM SERPL-SCNC: 138 MMOL/L (ref 134–144)

## 2018-10-24 NOTE — TELEPHONE ENCOUNTER
Pt spoke w Maryanne--situation has settled down--pt and  given appts here for Friday and pt also  was advised to schedule follow-up appt w neurologist for   If sxs escalate again it was recommended she call 911 for pt transport to ER for crisis intervention

## 2018-10-26 ENCOUNTER — OFFICE VISIT (OUTPATIENT)
Dept: FAMILY MEDICINE CLINIC | Facility: CLINIC | Age: 73
End: 2018-10-26
Payer: COMMERCIAL

## 2018-10-26 VITALS
SYSTOLIC BLOOD PRESSURE: 140 MMHG | HEART RATE: 58 BPM | TEMPERATURE: 96.8 F | RESPIRATION RATE: 14 BRPM | DIASTOLIC BLOOD PRESSURE: 62 MMHG | HEIGHT: 71 IN | BODY MASS INDEX: 19.46 KG/M2 | WEIGHT: 139 LBS

## 2018-10-26 DIAGNOSIS — F32.A ANXIETY AND DEPRESSION: ICD-10-CM

## 2018-10-26 DIAGNOSIS — J32.9 CHRONIC SINUSITIS, UNSPECIFIED LOCATION: ICD-10-CM

## 2018-10-26 DIAGNOSIS — R51.9 CHRONIC NONINTRACTABLE HEADACHE, UNSPECIFIED HEADACHE TYPE: Primary | ICD-10-CM

## 2018-10-26 DIAGNOSIS — G89.29 CHRONIC NONINTRACTABLE HEADACHE, UNSPECIFIED HEADACHE TYPE: Primary | ICD-10-CM

## 2018-10-26 DIAGNOSIS — F41.9 ANXIETY AND DEPRESSION: ICD-10-CM

## 2018-10-26 DIAGNOSIS — F03.91 DEMENTIA WITH BEHAVIORAL DISTURBANCE, UNSPECIFIED DEMENTIA TYPE (HCC): ICD-10-CM

## 2018-10-26 PROCEDURE — 3008F BODY MASS INDEX DOCD: CPT | Performed by: FAMILY MEDICINE

## 2018-10-26 PROCEDURE — 4040F PNEUMOC VAC/ADMIN/RCVD: CPT | Performed by: FAMILY MEDICINE

## 2018-10-26 PROCEDURE — 99214 OFFICE O/P EST MOD 30 MIN: CPT | Performed by: FAMILY MEDICINE

## 2018-10-26 PROCEDURE — 1160F RVW MEDS BY RX/DR IN RCRD: CPT | Performed by: FAMILY MEDICINE

## 2018-10-26 NOTE — PROGRESS NOTES
Assessment/Plan:  Diagnoses and all orders for this visit:    Chronic nonintractable headache, unspecified headache type  Comments:  rx celebrex for both arthritis and headaches along w tylenol prn  +past neuro evals reviewed--refer back for further eval/tx rec  Orders:  -     Ambulatory referral to Neurology; Future    Dementia with behavioral disturbance, unspecified dementia type  Comments:  ref to neurologistand neuropsychologist for further eval and tx recommendations  Orders:  -     Ambulatory referral to Neurology; Future    Chronic sinusitis, unspecified location  Comments:  findings on CT head reviewed-prob contributing factor in h/as-pt referred to ENT ro further eval/tx rec  in addition to neuro eval of h/as/dementia above  Anxiety and depression  Comments:  cont on Sertraline, [facility] counseling  Subjective:      Patient ID: Marlin Weber is a 74 y o  male  Chief Complaint   Patient presents with    Follow-up     anxiety and depression         40-year-old patient in with wife Breanna Franklin for follow-up on anxiety and depression as well as on her memory difficulties and behavioral outburst     Wife thinks patient is doing better since medications have been decreased and is now just on the sertraline for mood and off of the other antidepressants and anxiolytics medicines he was taking  They are receiving some counseling through [facility]  Patient continues with headaches and has had some continued weight loss  He has been seen by several neurologists consult reviewed in chart      Imaging has shown changes related to chronic sinusitis possible mucocele and also mention of possible fungal disease in differential     Patient has not had any recent ENT eval        The following portions of the patient's history were reviewed and updated as appropriate: allergies, current medications, past family history, past medical history, past social history, past surgical history and problem list      Review of Systems   Constitutional: Positive for appetite change, fatigue and unexpected weight change  Negative for fever  HENT: Positive for postnasal drip and sinus pressure  Respiratory: Negative  Cardiovascular: Negative  Musculoskeletal: Positive for arthralgias and myalgias  Neurological: Positive for headaches  Psychiatric/Behavioral: Positive for agitation, behavioral problems, decreased concentration, dysphoric mood and sleep disturbance  Negative for hallucinations and suicidal ideas  The patient is nervous/anxious  Objective:    /62 (BP Location: Left arm, Patient Position: Sitting, Cuff Size: Standard)   Pulse 58   Temp (!) 96 8 °F (36 °C)   Resp 14   Ht 5' 11" (1 803 m)   Wt 63 kg (139 lb)   BMI 19 39 kg/m²           Physical Exam   Constitutional:   Thin, pale,NAD   Eyes: Conjunctivae are normal  No scleral icterus  Cardiovascular: Normal rate and regular rhythm  Murmur heard  Pulmonary/Chest: Effort normal and breath sounds normal  No respiratory distress  Abdominal: Soft  Bowel sounds are normal    Musculoskeletal: Normal range of motion  Neurological: He is alert  No cranial nerve deficit  Skin: Skin is warm and dry  Psychiatric:   Anxious, denies a/v halluc  Nursing note and vitals reviewed  Labs;  Labs in chart were reviewed        Garth Root MD

## 2018-10-26 NOTE — LETTER
Date: 10/26/2018    Pt: Gildardo Ng  : 1945    Re: Cooling Assistance  Attn: [address]      To Whom It May Concern: This letter is written in support for the [employer] and [employer]  for Ramos rowland and Rosa Cassidy has been diagnosed with multiple medical conditions including heart valvular disease and prostate cancer for which cooling assistance would be of medical benefit  Please feel free to contact me with any further questions/concerns  Thank you for your consideration regarding this matter  Sincerely,    Justine Stare Lorine Skiff, MD

## 2018-10-30 ENCOUNTER — HOSPITAL ENCOUNTER (EMERGENCY)
Facility: HOSPITAL | Age: 73
Discharge: HOME | End: 2018-10-30
Attending: EMERGENCY MEDICINE
Payer: COMMERCIAL

## 2018-10-30 ENCOUNTER — APPOINTMENT (EMERGENCY)
Dept: RADIOLOGY | Facility: HOSPITAL | Age: 73
End: 2018-10-30
Payer: COMMERCIAL

## 2018-10-30 VITALS
RESPIRATION RATE: 18 BRPM | TEMPERATURE: 97.5 F | DIASTOLIC BLOOD PRESSURE: 70 MMHG | HEART RATE: 57 BPM | OXYGEN SATURATION: 98 % | SYSTOLIC BLOOD PRESSURE: 153 MMHG

## 2018-10-30 DIAGNOSIS — S09.90XA INJURY OF HEAD, INITIAL ENCOUNTER: ICD-10-CM

## 2018-10-30 DIAGNOSIS — T07.XXXA MULTIPLE ABRASIONS: Primary | ICD-10-CM

## 2018-10-30 PROCEDURE — 99284 EMERGENCY DEPT VISIT MOD MDM: CPT

## 2018-10-30 PROCEDURE — 70450 CT HEAD/BRAIN W/O DYE: CPT

## 2018-10-30 RX ORDER — DIAPER,BRIEF,INFANT-TODD,DISP
1 EACH MISCELLANEOUS 2 TIMES DAILY
Qty: 453.6 G | Refills: 0 | Status: SHIPPED | OUTPATIENT
Start: 2018-10-30 | End: 2018-12-10

## 2018-10-30 RX ORDER — GINSENG 100 MG
1 CAPSULE ORAL ONCE
Status: COMPLETED | OUTPATIENT
Start: 2018-10-30 | End: 2018-10-30

## 2018-10-30 RX ADMIN — BACITRACIN ZINC 1 SMALL APPLICATION: 500 OINTMENT TOPICAL at 16:39

## 2018-10-30 NOTE — ED PROVIDER NOTES
History  Chief Complaint   Patient presents with    Fall     Pt tripped and hit head, no LOC, no thinners  Pt c/o mild headache     64-year-old male presents after mechanical fall in the basement where he tripped over a pipe  Admits to hitting his head has an abrasion on the top of his head  Tetanus up-to-date  Denies loss of consciousness, no nausea, vomiting, headache, weakness,paresthesias or any other symptoms  No neck pain back pain or any other injuries or complaints at this time  Not on anticoagulation  Denies near syncope,syncope, chest pain,dyspnea prior to fall  History provided by:  Patient   used: No        Prior to Admission Medications   Prescriptions Last Dose Informant Patient Reported?  Taking?   bimatoprost (LUMIGAN) 0 01 % ophthalmic drops 10/29/2018 at Unknown time Self Yes Yes   Sig: Administer 1 drop to both eyes daily at bedtime     brimonidine-timolol (COMBIGAN) 0 2-0 5 % 10/30/2018 at Unknown time Self Yes Yes   Sig: Administer 1 drop to both eyes 2 (two) times a day   celecoxib (CeleBREX) 200 mg capsule 10/30/2018 at Unknown time Self No Yes   Sig: Take 1 capsule (200 mg total) by mouth daily As needed for headache, joint or muscle pain   fludrocortisone (FLORINEF) 0 1 mg tablet 10/30/2018 at Unknown time Self No Yes   Sig: Take 1 tablet (0 1 mg total) by mouth daily   sertraline (ZOLOFT) 50 mg tablet 10/30/2018 at Unknown time Self No Yes   Sig: Take 1 tablet (50 mg total) by mouth daily      Facility-Administered Medications: None       Past Medical History:   Diagnosis Date    Abnormal blood chemistry 09/21/2009    Aortic stenosis, moderate     Arthritis 05/09/2011    localized primary osteoarthritis of lower leg    Backache     Benign essential HTN     last assessed 12/28/17    Cataract     Depression with anxiety     Diarrhea     DVT (deep venous thrombosis) (HCC) 09/09/2011    Right Leg    Generalized anxiety disorder 08/10/2009    Glaucoma     last assesssed 2/11/13    Hearing problem     History of herniated intervertebral disc 04/28/2004    Memory loss     Prostate cancer [facility] )     PVD (peripheral vascular disease) ([facility] ) 04/28/2004    Skin cancer of nose     Skin cancer of nose        Past Surgical History:   Procedure Laterality Date    BUNIONECTOMY      simple bunion exostectomy (silver procedure)    CATARACT EXTRACTION      resolved 2012    CYSTOSCOPY W/ URETERAL STENT PLACEMENT      EYE SURGERY Bilateral     laser, left-2012 , right-2015    FRACTURE SURGERY      spinal, resolved 8/15/10    HERNIA REPAIR      inguinal sliding    OTHER SURGICAL HISTORY      needle cath placement for brachyther applic into genitalia    PROSTATE BIOPSY      needle bx    RENAL ARTERY STENT      REPLACEMENT TOTAL KNEE BILATERAL      right-2012 , left-2016       Family History   Problem Relation Age of Onset    Alzheimer's disease Mother     Stroke Father         CVA    Diabetes Sister      I have reviewed and agree with the history as documented  Social History   Substance Use Topics    Smoking status: Never Smoker    Smokeless tobacco: Never Used    Alcohol use No        Review of Systems   All other systems reviewed and are negative  Physical Exam  Physical Exam   Constitutional: He is oriented to person, place, and time  He appears well-developed and well-nourished  HENT:   Head: Normocephalic and atraumatic  Abrasions noted left frontal scalp   Eyes: Pupils are equal, round, and reactive to light  EOM are normal    Neck: Normal range of motion  Neck supple  Cardiovascular: Normal rate and regular rhythm  Pulmonary/Chest: Effort normal and breath sounds normal    Abdominal: Soft  Bowel sounds are normal    Musculoskeletal: Normal range of motion  Neurological: He is alert and oriented to person, place, and time  He displays normal reflexes  No cranial nerve deficit or sensory deficit   He exhibits normal muscle tone  Coordination normal    Skin: Skin is warm and dry  Psychiatric: He has a normal mood and affect  Nursing note and vitals reviewed  Vital Signs  ED Triage Vitals [10/30/18 1538]   Temperature Pulse Respirations Blood Pressure SpO2   97 5 °F (36 4 °C) 60 16 (!) 174/76 99 %      Temp Source Heart Rate Source Patient Position - Orthostatic VS BP Location FiO2 (%)   Tympanic Monitor Sitting Left arm --      Pain Score       --           Vitals:    10/30/18 1538   BP: (!) 174/76   Pulse: 60   Patient Position - Orthostatic VS: Sitting       Visual Acuity      ED Medications  Medications - No data to display    Diagnostic Studies  Results Reviewed     None                 CT head without contrast   Final Result by Darell Barron MD (10/30 1618)      No acute intracranial abnormality  Left frontal soft tissue scalp swelling without underlying fracture  Stable appearance of the left maxillary sinus which appears expanded and completely opacified with hyperdense material and calcification  Differential again includes chronic sinusitis/mucocele versus fungal disease  Workstation performed: [id number]                    Procedures  Procedures       Phone Contacts  ED Phone Contact    ED Course                               MDM  Number of Diagnoses or Management Options  Injury of head, initial encounter:   Multiple abrasions:   Diagnosis management comments: Patient evaluated with Imaging and I reviewed the results and discussed with the patient  Discharged with follow up, appropriate instructions and medications and patient verbalized understanding and had no further questions at the time of discharge  Patient well appearing and had stable vital signs at discharge         Amount and/or Complexity of Data Reviewed  Tests in the radiology section of CPT®: ordered and reviewed  Tests in the medicine section of CPT®: ordered and reviewed    Patient Progress  Patient progress: stable    CritCare Time    Disposition  Final diagnoses:   Multiple abrasions   Injury of head, initial encounter     Time reflects when diagnosis was documented in both MDM as applicable and the Disposition within this note     Time User Action Codes Description Comment    10/30/2018  4:24 PM Fernanda Youssef [T07  XXXA] Multiple abrasions     10/30/2018  4:24 PM Fernanda Youssef [S09 [id number]] Injury of head, initial encounter       ED Disposition     ED Disposition Condition Comment    Discharge  [address] discharge to home/self care  Condition at discharge: Stable        Follow-up Information     Follow up With Specialties Details Why Contact Info Additional Information    Mariya Aguilera MD [facility] Medicine Schedule an appointment as soon as possible for a visit  [address] [facility] [id number] [address] [facility] Emergency Department Emergency Medicine  If symptoms worsen [address] [facility], [place], [place], [address]          Patient's Medications   Discharge Prescriptions    BACITRACIN OINTMENT    Apply 1 g (1 application total) topically 2 (two) times a day for 7 days       Start Date: 10/30/2018End Date: 11/6/2018       Order Dose: 1 application       Quantity: 453 6 g    Refills: 0     No discharge procedures on file      ED Provider  Electronically Signed by           Kita Bence, DO  10/30/18 9911

## 2018-10-30 NOTE — ED NOTES
Pt not driving, wife called for a ride home  Will be waiting in the waiting room  Pt walking with steady gait with kayleigh Estrada RN  10/30/18 [id number]

## 2018-10-30 NOTE — DISCHARGE INSTRUCTIONS

## 2018-10-31 ENCOUNTER — TELEPHONE (OUTPATIENT)
Dept: FAMILY MEDICINE CLINIC | Facility: CLINIC | Age: 73
End: 2018-10-31

## 2018-10-31 NOTE — TELEPHONE ENCOUNTER
Dr Juaquin Craig    Patient fell yesterday and hit his head, went to ER [facility]  Feels fine now, do you want to see him for follow up? Please advise if we should put on schedule

## 2018-11-07 ENCOUNTER — TELEPHONE (OUTPATIENT)
Dept: FAMILY MEDICINE CLINIC | Facility: CLINIC | Age: 73
End: 2018-11-07

## 2018-11-07 NOTE — TELEPHONE ENCOUNTER
Dr Srinivasan Home    Patient wife states that Neuro Dr Frankie Samano does not take their insurance and he cannot get in to see Dr Steve Robert until 2/1  Please advise

## 2018-11-13 ENCOUNTER — TELEPHONE (OUTPATIENT)
Dept: FAMILY MEDICINE CLINIC | Facility: CLINIC | Age: 73
End: 2018-11-13

## 2018-11-13 NOTE — TELEPHONE ENCOUNTER
Spoke with spouse  She said patient takes 2 when he has a bad headache  I told her that this is not a good practice and he needs to take medication as prescribed   She said she will tell him but requesting a refill as he only has 6 pills left

## 2018-11-13 NOTE — TELEPHONE ENCOUNTER
Dr Rea Pill,  Please change celebrex rx to bid as he only has 6 pills left and ins won't allow refill until 12/7

## 2018-11-13 NOTE — TELEPHONE ENCOUNTER
Pt should not be taking BID-please find out if able to get neuro appt-see message under Melissa-his wife

## 2018-11-14 DIAGNOSIS — M19.90 ARTHRITIS: ICD-10-CM

## 2018-11-14 RX ORDER — CELECOXIB 200 MG/1
200 CAPSULE ORAL DAILY
Qty: 30 CAPSULE | Refills: 1 | Status: SHIPPED | OUTPATIENT
Start: 2018-11-14 | End: 2019-01-11 | Stop reason: SDUPTHER

## 2018-11-15 NOTE — TELEPHONE ENCOUNTER
Patient's wife said that she called the pharmacy and they don't have anything there for him  What did you call in for him?

## 2018-11-16 ENCOUNTER — TELEPHONE (OUTPATIENT)
Dept: NEUROLOGY | Facility: CLINIC | Age: 73
End: 2018-11-16

## 2018-11-16 ENCOUNTER — TELEPHONE (OUTPATIENT)
Dept: FAMILY MEDICINE CLINIC | Facility: CLINIC | Age: 73
End: 2018-11-16

## 2018-11-16 NOTE — TELEPHONE ENCOUNTER
I refilled the celebrex--  Pt can take otc tylenol and/or excedrin migraine until able to fill the celebrex   If no relief please schedule with available provider in office until pt able to get in with neurologist-thanks

## 2018-11-16 NOTE — TELEPHONE ENCOUNTER
Pt cannot get his new prescript for celebrex until dec 7,( he takes it for headaches) he has 4 pills left   He  takes Excedrin in between but that sometimes does not help  Is there anything else he can take  ?  Please advise throat culture/cma

## 2018-11-28 ENCOUNTER — TELEPHONE (OUTPATIENT)
Dept: FAMILY MEDICINE CLINIC | Facility: CLINIC | Age: 73
End: 2018-11-28

## 2018-11-28 NOTE — TELEPHONE ENCOUNTER
Dr Meryle Mass pt is still having the headaches and would like to know if you would recommend botox or pt saw on tv aimovig would you advise doing any of those treatments

## 2018-11-30 NOTE — TELEPHONE ENCOUNTER
Please check w Dr Alisa Barbosa office in [facility] believe they saw the [address] as well to see if they can fit them into their schedule   Please check with Rosa Dixon and Leonela Sow to see if they are ok with that before calling--thanks

## 2018-11-30 NOTE — TELEPHONE ENCOUNTER
Please call Dr Galarza's office to see if they can fit pt in for follow-up appt as headaches worsening-thanks

## 2018-12-10 ENCOUNTER — APPOINTMENT (EMERGENCY)
Dept: RADIOLOGY | Facility: HOSPITAL | Age: 73
End: 2018-12-10
Payer: COMMERCIAL

## 2018-12-10 ENCOUNTER — OFFICE VISIT (OUTPATIENT)
Dept: FAMILY MEDICINE CLINIC | Facility: CLINIC | Age: 73
End: 2018-12-10
Payer: COMMERCIAL

## 2018-12-10 ENCOUNTER — HOSPITAL ENCOUNTER (EMERGENCY)
Facility: HOSPITAL | Age: 73
Discharge: HOME | End: 2018-12-10
Attending: EMERGENCY MEDICINE
Payer: COMMERCIAL

## 2018-12-10 VITALS
TEMPERATURE: 98 F | OXYGEN SATURATION: 97 % | RESPIRATION RATE: 18 BRPM | SYSTOLIC BLOOD PRESSURE: 181 MMHG | DIASTOLIC BLOOD PRESSURE: 84 MMHG | HEART RATE: 51 BPM

## 2018-12-10 VITALS — DIASTOLIC BLOOD PRESSURE: 70 MMHG | SYSTOLIC BLOOD PRESSURE: 108 MMHG | HEART RATE: 62 BPM

## 2018-12-10 DIAGNOSIS — S09.90XA HEAD INJURY: Primary | ICD-10-CM

## 2018-12-10 DIAGNOSIS — S00.03XA HEMATOMA OF SCALP, INITIAL ENCOUNTER: ICD-10-CM

## 2018-12-10 DIAGNOSIS — S16.1XXA CERVICAL STRAIN, ACUTE, INITIAL ENCOUNTER: ICD-10-CM
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
## 2018-12-10 PROCEDURE — 99284 EMERGENCY DEPT VISIT MOD MDM: CPT

## 2018-12-10 PROCEDURE — 70450 CT HEAD/BRAIN W/O DYE: CPT

## 2018-12-10 PROCEDURE — 99213 OFFICE O/P EST LOW 20 MIN: CPT | Performed by: FAMILY MEDICINE

## 2018-12-10 PROCEDURE — 72125 CT NECK SPINE W/O DYE: CPT

## 2018-12-10 NOTE — DISCHARGE INSTRUCTIONS

## 2018-12-24 ENCOUNTER — OFFICE VISIT (OUTPATIENT)
Dept: FAMILY MEDICINE CLINIC | Facility: CLINIC | Age: 73
End: 2018-12-24
Payer: COMMERCIAL

## 2018-12-24 VITALS
DIASTOLIC BLOOD PRESSURE: 78 MMHG | BODY MASS INDEX: 24.11 KG/M2 | HEIGHT: 67 IN | SYSTOLIC BLOOD PRESSURE: 110 MMHG | WEIGHT: 153.6 LBS | TEMPERATURE: 96.7 F | HEART RATE: 56 BPM

## 2018-12-24 DIAGNOSIS — G89.29 CHRONIC NONINTRACTABLE HEADACHE, UNSPECIFIED HEADACHE TYPE: ICD-10-CM

## 2018-12-24 DIAGNOSIS — R51.9 CHRONIC NONINTRACTABLE HEADACHE, UNSPECIFIED HEADACHE TYPE: ICD-10-CM

## 2018-12-24 DIAGNOSIS — F41.9 ANXIETY AND DEPRESSION: ICD-10-CM

## 2018-12-24 DIAGNOSIS — I95.1 ORTHOSTASIS: ICD-10-CM

## 2018-12-24 DIAGNOSIS — R26.89 BALANCE PROBLEM: Primary | ICD-10-CM

## 2018-12-24 DIAGNOSIS — F32.A ANXIETY AND DEPRESSION: ICD-10-CM

## 2018-12-24 DIAGNOSIS — I35.0 AORTIC STENOSIS, MODERATE: ICD-10-CM

## 2018-12-24 PROCEDURE — 99214 OFFICE O/P EST MOD 30 MIN: CPT | Performed by: FAMILY MEDICINE

## 2018-12-24 NOTE — PROGRESS NOTES
Assessment/Plan:  Diagnoses and all orders for this visit:    Balance problem  Comments:  ref to PT/Balance center  Sched cardio and neurofolow-ups--await further recommendations  Orders:  -     Ambulatory referral to Physical Therapy; Future    Chronic nonintractable headache, unspecified headache type  Comments:  CT head with no acute intracranial abnl  Some atrophy and chronic left max sinusitis  -no sig relief w past abx useor other med-await neuro recommendations  Orthostasis  Comments:  BP today wnl  Pt on Florinef  Sched cardio follow-up as above  Anxiety and depression  Comments:  stable  cont Sertraline  Orders:  -     sertraline (ZOLOFT) 50 mg tablet; Take 1 tablet (50 mg total) by mouth daily    Aortic stenosis, moderate  Comments:  ?dx AS-ECHO in [facility] w/o evidence of-check if other Echo done -sched cardio follow-up w Dr Owen Covert  Subjective:      Patient ID: Jony Carrillo is a 74 y o  male  Chief Complaint   Patient presents with    Follow-up     slw 12/10 fell and hit the back of his head in the elevator       49-year-old patient in with wife for follow-up  Has been having intermittent problems with balance  Did have fall on 12/10 2018-eval in the ED in office notes here reviewed  Continues with chronic headaches-has appointment with neurology next month for further recommendations  Most recent CT of the head shows no acute intracranial abnormality but does show some atrophy chronic vessel disease and some changes associated with left maxillary sinusitis  Patient has had no significant relief in past with antibiotic or other med use tried here  Currently his blood pressure is within normal -he does take for an off for past diagnosis of orthostasis    He is due for follow-up with Cardiol- has seen Dr King in the past   Does have diagnosis of AS listed - but last echo reviewed in all scripts does not show evidence of this-  need to check if other echo outside of one reviewed done   Mood presently stable on Zoloft  Has had some behavioral issues in past       The following portions of the patient's history were reviewed and updated as appropriate: allergies, current medications, past family history, past medical history, past social history, past surgical history and problem list      Review of Systems   Constitutional: Positive for fatigue and fever  HENT: Positive for postnasal drip  Eyes: Negative for discharge and visual disturbance  Respiratory: Negative  Cardiovascular: Negative  Gastrointestinal: Negative  Musculoskeletal: Positive for arthralgias, gait problem and myalgias  Skin: Negative for rash  Neurological: Positive for weakness, light-headedness and headaches  Negative for seizures and speech difficulty  Psychiatric/Behavioral: Positive for behavioral problems, confusion, decreased concentration and sleep disturbance  The patient is nervous/anxious  Objective:    /78 (BP Location: Left arm, Patient Position: Sitting, Cuff Size: Standard)   Pulse 56   Temp (!) 96 7 °F (35 9 °C)   Ht 5' 6 5" (1 689 m)   Wt 69 7 kg (153 lb 9 6 oz)   BMI 24 42 kg/m²        Physical Exam   Constitutional: He is oriented to person, place, and time  He appears well-developed and well-nourished  No distress  HENT:   Head: Normocephalic and atraumatic  Mouth/Throat: No oropharyngeal exudate  pngtt  No sig facial tenderness   Eyes: Conjunctivae are normal  No scleral icterus  Neck: Neck supple  Cardiovascular: Normal rate, regular rhythm and intact distal pulses  Murmur heard  Pulmonary/Chest: Effort normal and breath sounds normal  No respiratory distress  Abdominal: Soft  Bowel sounds are normal  There is no tenderness  Musculoskeletal: Normal range of motion  Neurological: He is alert and oriented to person, place, and time  No cranial nerve deficit  Skin: Skin is warm and dry  No rash noted     Psychiatric: He has a normal mood and affect  Nursing note and vitals reviewed  Labs;  Labs in chart were reviewed        Umu Carvajal MD

## 2018-12-27 ENCOUNTER — HOSPITAL ENCOUNTER (EMERGENCY)
Facility: HOSPITAL | Age: 73
Discharge: HOME | End: 2018-12-27
Attending: EMERGENCY MEDICINE | Admitting: EMERGENCY MEDICINE
Payer: COMMERCIAL

## 2018-12-27 ENCOUNTER — APPOINTMENT (EMERGENCY)
Dept: RADIOLOGY | Facility: HOSPITAL | Age: 73
End: 2018-12-27
Payer: COMMERCIAL

## 2018-12-27 ENCOUNTER — EVALUATION (OUTPATIENT)
Dept: PHYSICAL THERAPY | Facility: CLINIC | Age: 73
End: 2018-12-27
Payer: COMMERCIAL

## 2018-12-27 VITALS
DIASTOLIC BLOOD PRESSURE: 90 MMHG | SYSTOLIC BLOOD PRESSURE: 197 MMHG | WEIGHT: 153.66 LBS | HEART RATE: 56 BPM | RESPIRATION RATE: 20 BRPM | TEMPERATURE: 98 F | BODY MASS INDEX: 24.43 KG/M2 | OXYGEN SATURATION: 99 %

## 2018-12-27 DIAGNOSIS — R26.89 BALANCE PROBLEM: ICD-10-CM

## 2018-12-27 DIAGNOSIS — S09.90XA INJURY OF HEAD, INITIAL ENCOUNTER: ICD-10-CM
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
## 2018-12-27 PROCEDURE — 70450 CT HEAD/BRAIN W/O DYE: CPT

## 2018-12-27 PROCEDURE — 97163 PT EVAL HIGH COMPLEX 45 MIN: CPT

## 2018-12-27 PROCEDURE — G8979 MOBILITY GOAL STATUS: HCPCS

## 2018-12-27 PROCEDURE — G8978 MOBILITY CURRENT STATUS: HCPCS

## 2018-12-27 PROCEDURE — G8980 MOBILITY D/C STATUS: HCPCS

## 2018-12-27 PROCEDURE — 72125 CT NECK SPINE W/O DYE: CPT

## 2018-12-27 PROCEDURE — 99284 EMERGENCY DEPT VISIT MOD MDM: CPT

## 2018-12-27 NOTE — PROGRESS NOTES
PT Evaluation     Today's date: 2018  Patient name: Pansy Peabody  : 1945  MRN: [id number]  Referring provider: Esperanza Be MD  Dx:   Encounter Diagnosis     ICD-10-CM    1  Balance problem R26 89 Ambulatory referral to Physical Therapy       Start Time: 64  Stop Time: 430  Total time in clinic (min): 66 minutes    Assessment  Assessment details: Patient presents to PT with history of frequent falls including fall entering clinic this morning  Falls frequently result in contusions to his head with h/o evaluation for concussion due to previous fall  Patient demonstrates significant risk for falls on standardized balance tests  Further testing will follow, limited today to due fall this morning  Patient will benefit from skilled intervention to facilitate increased safety in all community mobility to attain all goals as noted below  Patient and wife are in agreement with treatment plan  Patient has been instructed to use rolling walker for all ambulation and voiced understanding until further gains in balance/gait are noted  Impairments: abnormal gait, impaired balance, pain with function and safety issue    Goals  STG (4weeks)    Patient will demonstrate independent HEP to facilitate improved balance and gait                              Patient will demonstrate improved TUG scoring by 10 sec to increase safety in all ambulation                              Patient will be assessed for 6 min walk test and gait speed with goals to follow    LTG (8weeks)    Patient will demonstrate improved TUG scoring by 25% to increase safety in all ambulation                            Patient will demonstrate improved DIAZ scoring by >=20 points to increase safety in all mobility                             Patient will demonstrate independent ambulation without device for household distances with safe stability                            Patient will demonstrate normalized soft tissue    Plan  Plan details: Continue PT per POC  (2018 - 2018)  Planned therapy interventions: ADL retraining, manual therapy, neuromuscular re-education, home exercise program, therapeutic exercise, therapeutic training, therapeutic activities, patient education, postural training, balance and coordination  Frequency: 2x week  Duration in weeks: 8  Treatment plan discussed with: family and patient        Subjective Evaluation    History of Present Illness  Mechanism of injury: Patient indicates he has been experiencing increased falls even with using a cane  No aural fullness or tinnitus noted  Patient is hard of hearing  He arrived with his wife and a cane  He fell on the way into the clinic and was sent to the ER for evaluation prior to PT evaluation today  Per patient and wife, CT (-)  Patient indicates he feels off balance  Recurrent probem    Pain  Current pain ratin  At best pain ratin  At worst pain ratin  Location: Right neck and back pain from fall, frequent HA once a day  top of head and front   Quality: throbbing and dull ache  Relieving factors: medications, ice and rest  Progression: worsening    Social Support  Steps to enter house: yes  2  Stairs in house: yes   15  Lives in: multiple-level home  Lives with: spouse    Employment status: not working  Hand dominance: left      Diagnostic Tests  CT scan: normal  Patient Goals  Patient goals for therapy: improved balance and independence with ADLs/IADLs  Patient goal: to be able to walk without fear of falls        Objective    NEURO:  Intact to light touch t/o  Occasional numbness in right hand  Negative drift  Intact finger to nose  JOSE slow and ataxic  No abnormal tone noted  Strength 4/5 t/o  BALANCE:  Romberg EO/EC (-)  Sharpened romberg unable to assume position  Patient exhibits delayed ankle and hip strategies  GAIT:  Shuffling pattern, decreased foot clearance left  Increased IR at left hip    Patient has not been utilizing a device at home and uses a cane for community ambulation  Due to recent falls, patient instructed to use a rolling walker for all ambulation  Patient has a walker at home and voiced understanding  OCULOMOTOR:  Age-appropriate pursuit t/o visual field    CERVICAL:  Rotation right with increased pain, 3/4 range rotation B/L, full flexion and extension  Right radicular symptoms intermittently noted  POSTURE:  Increased forward head and shoulders, slouched posture in sitting, increased base of support in stance  SOFT TISSUE:  Increased point tenderness noted right flank with increased soft tissue density, probably due to fall this am   Increased orbital edema right due to fall          Flowsheet Rows      Most Recent Value   PT/OT G-Codes   Current Score  47   Projected Score  67   FOTO information reviewed  Yes   Assessment Type  Evaluation   G code set  Mobility: Walking & Moving Around   Mobility: Walking and Moving Around Current Status ()  CL   Mobility: Walking and Moving Around Goal Status ()  CJ          Precautions:  H/o B/L TKR, h/o LB surgery, chronic HA, orthostasis, frequent falls, cataract sx, anxiety/depression     Daily Treatment Diary     TESTING 12/27            TUG 37 67sec            Gait speed TBA            DIAZ 25/56            6 min walk test TBA                             Exercise Diary              Marching             Hip abduction             Hip extension             //bars hurdles             Step taps //bars             sidestepping             FIRM  EO/EC             FIRM  HT             Gait with solo step                                                                                                                                                                Modalities

## 2018-12-27 NOTE — DISCHARGE INSTRUCTIONS
Head Injury   WHAT YOU NEED TO KNOW:   A head injury is most often caused by a blow to the head  This may occur from a fall, bicycle injury, sports injury, being struck in the head, or a motor vehicle accident  DISCHARGE INSTRUCTIONS:   Call 911 or have someone else call for any of the following:   · You cannot be woken  · You have a seizure  · You stop responding to others or you faint  · You have blurry or double vision  · Your speech becomes slurred or confused  · You have arm or leg weakness, loss of feeling, or new problems with coordination  · Your pupils are larger than usual or one pupil is a different size than the other  · You have blood or clear fluid coming out of your ears or nose  Seek care immediately if:   · You have repeated or forceful vomiting  · You feel confused  · Your headache gets worse or becomes severe  · You or someone caring for you notices that you are harder to wake than usual   Contact your healthcare provider if:   · Your symptoms last longer than 6 weeks after the injury  · You have questions or concerns about your condition or care  Medicines:   · Acetaminophen  decreases pain  Acetaminophen is available without a doctor's order  Ask how much to take and how often to take it  Follow directions  Acetaminophen can cause liver damage if not taken correctly  · Take your medicine as directed  Contact your healthcare provider if you think your medicine is not helping or if you have side effects  Tell him or her if you are allergic to any medicine  Keep a list of the medicines, vitamins, and herbs you take  Include the amounts, and when and why you take them  Bring the list or the pill bottles to follow-up visits  Carry your medicine list with you in case of an emergency  Self-care:   · Rest  or do quiet activities for 24 to 48 hours  Limit your time watching TV, using the computer, or doing tasks that require a lot of thinking   Slowly return to your normal activities as directed  Do not play sports or do activities that may cause you to get hit in the head  Ask your healthcare provider when you can return to sports  · Apply ice  on your head for 15 to 20 minutes every hour or as directed  Use an ice pack, or put crushed ice in a plastic bag  Cover it with a towel before you apply it to your skin  Ice helps prevent tissue damage and decreases swelling and pain  · Have someone stay with you for 24 hours  or as directed  This person can monitor you for complications and call 179  When you are awake the person should ask you a few questions to see if you are thinking clearly  An example would be to ask your name or your address  Prevent another head injury:   · Wear a helmet that fits properly  Do this when you play sports, or ride a bike, scooter, or skateboard  Helmets help decrease your risk of a serious head injury  Talk to your healthcare provider about other ways you can protect yourself if you play sports  · Wear your seat belt every time you are in a car  This helps to decrease your risk for a head injury if you are in a car accident  Follow up with your healthcare provider as directed:  Write down your questions so you remember to ask them during your visits  © 2017 [address] Information is for End User's use only and may not be sold, redistributed or otherwise used for commercial purposes  All illustrations and images included in CareNotes® are the copyrighted property of A D [employer] M [employer]  or Felipe Treviño  The above information is an  only  It is not intended as medical advice for individual conditions or treatments  Talk to your doctor, nurse or pharmacist before following any medical regimen to see if it is safe and effective for you  Fall Prevention for Older Adults   WHAT YOU NEED TO KNOW:   As you age, your muscles weaken and your risk for falls increases   Your risk also increases if you take medicines that make you sleepy or dizzy  You may also be at risk if you have vision or joint problems, have low blood pressure, or are not active  DISCHARGE INSTRUCTIONS:   Call 911 or have someone else call if:   · You have fallen and are unconscious  · You have fallen and cannot move part of your body  Contact your healthcare provider if:   · You have fallen and have pain or a headache  · You have questions or concerns about your condition or care  Fall prevention tips:   · Stay active  Exercise can help strengthen your muscles and improve your balance  Your healthcare provider may recommend water aerobics, walking, or [employer]  He may also recommend physical therapy to improve your coordination  Never start an exercise program without asking your healthcare provider first     · Wear shoes that fit well and have soles that   Wear shoes both inside and outside  Use slippers with good   Avoid shoes with high heels  · Use assistive devices as directed  Your healthcare provider may suggest that you use a cane or walker to help you keep your balance  You may need to have grab bars put in your bathroom near the toilet or in the shower  · Stand or sit up slowly  This may help you keep your balance and prevent falls  · Wear a personal alarm  This is a device that allows you to call 911 if you need help  Ask for more information on personal alarms  · Manage your medical conditions  Keep all appointments with your healthcare providers  Visit your eye doctor as directed  Home safety tips:   · Add items to prevent falls in the bathroom  Put nonslip strips on your bath or shower floor to prevent you from slipping  Use a bath mat if you do not have carpet in the bathroom  This will prevent you from falling when you step out of the bath or shower  Use a shower seat so you do not need to stand while you shower   Sit on the toilet or a chair in your bathroom to dry yourself and put on clothing  This will prevent you from losing your balance from drying or dressing yourself while you are standing  · Keep paths clear  Remove books, shoes, and other objects from walkways and stairs  Place cords for telephones and lamps out of the way so that you do not need to walk over them  Tape them down if you cannot move them  Remove small rugs  If you cannot remove a rug, secure it with double-sided tape  This will prevent you from tripping  · Install bright lights in your home  Use night lights to help light paths to the bathroom or kitchen  Always turn on the light before you start walking  · Keep items you use often on shelves within reach  Do not use a step stool to help you reach an item  · Paint or place reflective tape on the edges of your stairs  This will help you see the stairs better  Follow up with your healthcare provider as directed:  Write down your questions so you remember to ask them during your visits  © 2017 [address] Information is for End User's use only and may not be sold, redistributed or otherwise used for commercial purposes  All illustrations and images included in CareNotes® are the copyrighted property of A D A M , Inc  or Felipe Treviño  The above information is an  only  It is not intended as medical advice for individual conditions or treatments  Talk to your doctor, nurse or pharmacist before following any medical regimen to see if it is safe and effective for you

## 2018-12-27 NOTE — ED NOTES
Dr Gretchen Goodman notified of patient's BP  As per Dr Gretchen Goodman patient maybe D/C with the instruction to F/U with PCP in regards to his BP        Jeremy Arriaza RN  12/27/18 3503

## 2018-12-28 ENCOUNTER — TELEPHONE (OUTPATIENT)
Dept: FAMILY MEDICINE CLINIC | Facility: CLINIC | Age: 73
End: 2018-12-28

## 2018-12-28 NOTE — ED PROVIDER NOTES
History  Chief Complaint   Patient presents with    Fall     fall on grass outside physical therapy office  pt hit right side of head (abrasion/bruise above right eye)  pt denies LOC  pt is not on a blood thinner  pt is A+Ox4  resp unlabored     Patient presents for evaluation after a fall  Patient was outside his physical therapist office  States he was walking on the sidewalk and stepped on the edge by the grass and fell  No LOC  Denies blood thinners  Walks with a cane  History provided by:  Patient   used: No    Fall       Prior to Admission Medications   Prescriptions Last Dose Informant Patient Reported?  Taking?   bimatoprost (LUMIGAN) 0 01 % ophthalmic drops 12/27/2018 at Unknown time Self Yes Yes   Sig: Administer 1 drop to both eyes daily at bedtime     brimonidine-timolol (COMBIGAN) 0 2-0 5 % 12/27/2018 at Unknown time Self Yes Yes   Sig: Administer 1 drop to both eyes 2 (two) times a day   celecoxib (CeleBREX) 200 mg capsule 12/27/2018 at Unknown time  No Yes   Sig: Take 1 capsule (200 mg total) by mouth daily As needed for headache, joint or muscle pain   fludrocortisone (FLORINEF) 0 1 mg tablet 12/27/2018 at Unknown time Self No Yes   Sig: Take 1 tablet (0 1 mg total) by mouth daily   sertraline (ZOLOFT) 50 mg tablet 12/27/2018 at Unknown time  No Yes   Sig: Take 1 tablet (50 mg total) by mouth daily      Facility-Administered Medications: None       Past Medical History:   Diagnosis Date    Abnormal blood chemistry 09/21/2009    Aortic stenosis, moderate     Arthritis 05/09/2011    localized primary osteoarthritis of lower leg    Backache     Benign essential HTN     last assessed 12/28/17    Cataract     Depression with anxiety     Diarrhea     DVT (deep venous thrombosis) (HCC) 09/09/2011    Right Leg    Generalized anxiety disorder 08/10/2009    Glaucoma     last assesssed 2/11/13    Hearing problem     History of herniated intervertebral disc 04/28/2004    Memory loss     [facility]     PVD [facility]peripheral vascular disease) ([facility] ) 04/28/2004    Skin cancer of nose     Skin cancer of nose        Past Surgical History:   Procedure Laterality Date    BUNIONECTOMY      simple bunion exostectomy (silver procedure)    CATARACT EXTRACTION      resolved 2012    CYSTOSCOPY W/ URETERAL STENT PLACEMENT      EYE SURGERY Bilateral     laser, left-2012 , right-2015    FRACTURE SURGERY      spinal, resolved 8/15/10    HERNIA REPAIR      inguinal sliding    OTHER SURGICAL HISTORY      needle cath placement for brachyther applic into genitalia    PROSTATE BIOPSY      needle bx    RENAL ARTERY STENT      REPLACEMENT TOTAL KNEE BILATERAL      right-2012 , left-2016       Family History   Problem Relation Age of Onset    Alzheimer's disease Mother     Stroke Father         CVA    Diabetes Sister      I have reviewed and agree with the history as documented  Social History   Substance Use Topics    Smoking status: Never Smoker    Smokeless tobacco: Never Used    Alcohol use No        Review of Systems   All other systems reviewed and are negative  Physical Exam  Physical Exam   Constitutional: He is oriented to person, place, and time  No distress  HENT:   Head:       Mouth/Throat: Oropharynx is clear and moist    Eyes: Pupils are equal, round, and reactive to light  EOM are normal    Neck: Normal range of motion  Neck supple  No midline tenderness   Cardiovascular: Normal rate, regular rhythm and intact distal pulses  Pulmonary/Chest: Effort normal and breath sounds normal  No respiratory distress  Abdominal: Soft  There is no tenderness  Musculoskeletal: Normal range of motion  Neurological: He is alert and oriented to person, place, and time  No cranial nerve deficit or sensory deficit  He exhibits normal muscle tone  Coordination normal    Skin: Capillary refill takes less than 2 seconds  He is not diaphoretic     Nursing note and vitals reviewed  Vital Signs  ED Triage Vitals [12/27/18 1151]   Temperature Pulse Respirations Blood Pressure SpO2   98 °F (36 7 °C) 58 20 (!) 211/85 100 %      Temp Source Heart Rate Source Patient Position - Orthostatic VS BP Location FiO2 (%)   Tympanic Monitor Sitting Right arm --      Pain Score       No Pain           Vitals:    12/27/18 1151 12/27/18 1324   BP: (!) 211/85 (!) 197/90   Pulse: 58 56   Patient Position - Orthostatic VS: Sitting Lying       Visual Acuity      ED Medications  Medications - No data to display    Diagnostic Studies  Results Reviewed     None                 CT cervical spine without contrast   Final Result by Kolby Boswell MD (12/27 3750)      No cervical spine fracture or traumatic malalignment  Degenerative changes  Workstation performed: [id number]         CT head without contrast   Final Result by Kolby Boswell MD (12/27 6251)      No acute intracranial abnormality  No change  Microangiopathic change  Atrophy  Chronic left maxillary sinusitis  Workstation performed: [id number]                    Procedures  Procedures       Phone Contacts  ED Phone Contact    ED Course                               MDM  Number of Diagnoses or Management Options  Fall, initial encounter:   Injury of head, initial encounter:   Diagnosis management comments: Pulse ox 99% on RA indicating adequate oxygenation    Discussed CT results with patient  Advised to follow up with PMD and have BP rechecked          Amount and/or Complexity of Data Reviewed  Tests in the radiology section of CPT®: ordered and reviewed  Decide to obtain previous medical records or to obtain history from someone other than the patient: yes  Review and summarize past medical records: yes    Patient Progress  Patient progress: stable    CritCare Time    Disposition  Final diagnoses:   Fall, initial encounter   Injury of head, initial encounter     Time reflects when diagnosis was documented in both MDM as applicable and the Disposition within this note     Time User Action Codes Description Comment    12/27/2018  1:01 PM Kathie Galvin Add [P78  JAQT] Fall, initial encounter     12/27/2018  1:01 PM Summer Boyle Add [S09 [id number]] Injury of head, initial encounter       ED Disposition     ED Disposition Condition Comment    Discharge  [address] discharge to home/self care  Condition at discharge: stable        Follow-up Information     Follow up With Specialties Details Why Contact Info Additional Information    Lucrecia Lao MD Family Medicine In 3 days If symptoms worsen [address] [facility] Emergency Medicine  If symptoms worsen [address] [facility] [address]  [phone] [facility], Michelle Sweeney, [place], [address]          Discharge Medication List as of 12/27/2018  1:02 PM      CONTINUE these medications which have NOT CHANGED    Details   bimatoprost (LUMIGAN) 0 01 % ophthalmic drops Administer 1 drop to both eyes daily at bedtime  , Historical Med      brimonidine-timolol (COMBIGAN) 0 2-0 5 % Administer 1 drop to both eyes 2 (two) times a day, Historical Med      celecoxib (CeleBREX) 200 mg capsule Take 1 capsule (200 mg total) by mouth daily As needed for headache, joint or muscle pain, Starting Wed 11/14/2018, Normal      fludrocortisone (FLORINEF) 0 1 mg tablet Take 1 tablet (0 1 mg total) by mouth daily, Starting Thu 8/23/2018, Normal      sertraline (ZOLOFT) 50 mg tablet Take 1 tablet (50 mg total) by mouth daily, Starting Mon 12/24/2018, Normal           No discharge procedures on file      ED Provider  Electronically Signed by           Tim Guerra DO  12/28/18 4224

## 2018-12-28 NOTE — TELEPHONE ENCOUNTER
SPOKE WITH JAGUAR (WIFE)  PATIENT STATED THAT CARLIN IS A LITTLE TIRED OTHERWISE HE IS DONG FINE        THEY HAVE BEEN EVALUATED AT THE [facility] AND WILL CONTINUE WITH APPTS

## 2018-12-29 NOTE — PROGRESS NOTES
Assessment/Plan:   Diagnoses and all orders for this visit:    Fall, initial encounter  Comments:  mechanical fll in elevator on way up to office  +Head injury  No LOC  Hematoma of scalp, initial encounter  Comments:  Ice applied in office--pt transferred to [facility]  Subjective:      Patient ID: Genoveva Parrish is a 74 y o  male  Chief Complaint   Patient presents with    Fall     pt fell in [address] [facility] and hit his head       Fall   The accident occurred less than 1 hour ago  The fall occurred while standing  There was no blood loss  The point of impact was the head  The pain is present in the head and neck  Associated symptoms include headaches  Pertinent negatives include no fever, loss of consciousness, nausea, numbness, tingling, visual change or vomiting  He has tried nothing for the symptoms  Patient was on way to office for evaluation of cold symptoms  when he fell in elevator  No loss of consciousness  Has problems with chronic headaches-no worse than normal   Some nausea no vomiting  Lightheadedness which is chronic in nature and intermittent in occurrence  Has been having problems with balance-is scheduled to see neurology next month  Has had no recent visits with cardiologist     The following portions of the patient's history were reviewed and updated as appropriate: allergies, current medications, past family history, past medical history, past social history, past surgical history and problem list      Review of Systems   Constitutional: Positive for fatigue  Negative for fever  Respiratory: Negative  Cardiovascular: Negative  Gastrointestinal: Negative for nausea and vomiting  Musculoskeletal: Positive for arthralgias, gait problem and neck pain  Skin: Negative for rash  Neurological: Positive for light-headedness and headaches  Negative for tingling, loss of consciousness and numbness     Psychiatric/Behavioral: Positive for decreased concentration and sleep disturbance  The patient is nervous/anxious  Objective:    /70 (BP Location: Left arm, Patient Position: Sitting, Cuff Size: Large)   Pulse 62        Physical Exam   Constitutional: He is oriented to person, place, and time  He appears well-developed and well-nourished  HENT:   +Posterior scalp hematoma   Neck: Normal range of motion  Neck supple  Mild posterior cervical tenderness   Cardiovascular: Normal rate, regular rhythm and intact distal pulses  Murmur heard  Pulmonary/Chest: Effort normal and breath sounds normal  No respiratory distress  Abdominal: Soft  Bowel sounds are normal  There is no tenderness  Musculoskeletal: He exhibits tenderness  Neurological: He is alert and oriented to person, place, and time  No cranial nerve deficit  Skin: Skin is warm and dry  No rash noted  Psychiatric: He has a normal mood and affect  Nursing note and vitals reviewed          Leny Magana MD

## 2018-12-31 ENCOUNTER — OFFICE VISIT (OUTPATIENT)
Dept: PHYSICAL THERAPY | Facility: CLINIC | Age: 73
End: 2018-12-31
Payer: COMMERCIAL

## 2018-12-31 DIAGNOSIS — R26.89 BALANCE PROBLEM: Primary | ICD-10-CM

## 2018-12-31 PROCEDURE — 97110 THERAPEUTIC EXERCISES: CPT

## 2018-12-31 PROCEDURE — 97112 NEUROMUSCULAR REEDUCATION: CPT

## 2018-12-31 NOTE — PROGRESS NOTES
Daily Note     Today's date: 2018  Patient name: Kristyn Vaughan  : 1945  MRN: [id number]  Referring provider: Tr Crawford MD  Dx:   Encounter Diagnosis     ICD-10-CM    1  Balance problem R26 89        Start Time:   Stop Time:   Total time in clinic (min): 60 minutes    Subjective: Patient arrived with rolling walker, accompanied with wife  Patient indicates he fell over the weekend presenting with a black eye on the right  Patient was note using a device at the time of fall  Patient indicated he understands need to use device for all ambulation  No current complaints of pain  Objective: See treatment diary below    Precautions:  H/o B/L TKR, h/o LB surgery, chronic HA, orthostasis, frequent falls, cataract sx, anxiety/depression     Daily Treatment Diary     TESTING             TUG 37 67sec            Gait speed TBA            DIAZ             6 min walk test TBA                             Exercise Diary              Marching 20reps B/L            Hip abduction 20reps B/L            Hip extension 20reps B/L            //bars hurdles 5 hurdles low 4 cycles one UE            Step taps //bars One UE 20reps            sidestepping On foam             FIRM  EO/EC 10sec FA foam in bars LOB 5             FIRM  HT FOAM  FA  EC  10reps            Gait with solo step             Sit to stand no UE  15reps 2 sets                                                                                                                                                  Modalities                                                           Assessment: Treatment assisted by Jenna Neal DPT, to provide one on one treatment per Medicare policy  Patient initiated LE strengthening and balance/vestibular challenges with good tolerance  Patient instructed in HEP including ankle df/pf, marching, hip abduction, hip extension and sit to stand    Patient demonstrates well and has been instructed to continue at home  Patient voiced understanding and has been given written instructions  Patient will continue to benefit from skilled intervention to facilitate increased safety in all ADLs  Plan: Continue PT per POC  Patient without complaints post treatment    (12/27/2018 - 2/21/2018)

## 2019-01-03 ENCOUNTER — OFFICE VISIT (OUTPATIENT)
Dept: PHYSICAL THERAPY | Facility: CLINIC | Age: 74
End: 2019-01-03
Payer: COMMERCIAL

## 2019-01-03 DIAGNOSIS — R26.89 BALANCE PROBLEM: Primary | ICD-10-CM

## 2019-01-03 PROCEDURE — 97530 THERAPEUTIC ACTIVITIES: CPT

## 2019-01-03 PROCEDURE — 97112 NEUROMUSCULAR REEDUCATION: CPT

## 2019-01-03 PROCEDURE — 97110 THERAPEUTIC EXERCISES: CPT

## 2019-01-03 NOTE — PROGRESS NOTES
Daily Note     Today's date: 1/3/2019  Patient name: Garfield Burrows  : 1945  MRN: [id number]  Referring provider: Felton Acevedo MD  Dx:   Encounter Diagnosis     ICD-10-CM    1  Balance problem R26 89        Start Time:   Stop Time: 930  Total time in clinic (min): 45 minutes    Subjective: Patient arrived with rolling walker, accompanied by wife today  Reports no falls since last appointment  Patient did not perform HEP over the weekend  Objective: See treatment diary below    Precautions:  H/o B/L TKR, h/o LB surgery, chronic HA, orthostasis, frequent falls, cataract sx, anxiety/depression     Daily Treatment Diary     TESTING             TUG 37 67sec            Gait speed TBA            DIAZ             6 min walk test TBA                             Exercise Diary  12/31 1/3/2019           Marching 20reps B/L 20 reps, 3 second hold bilaterally           Hip abduction 20reps B/L 20 reps, 3 second hold bilaterally           Hip extension 20reps B/L 20 reps, 3 second hold bilaterally           //bars hurdles 5 hurdles low 4 cycles one UE 5 cycles in parallel bars, forward, laterally, 2 UE 6" hurdles           Step taps //bars One UE 20reps 4" steps, 20 reps bilaterally            sidestepping On foam             FIRM  EO/EC 10sec FA foam in bars LOB 5  20 seconds FTEO 2 sets, FTEC 2 sets           FIRM  HT FOAM  FA  EC  10reps            Gait with solo step             Sit to stand no UE  15reps 2 sets 15 reps           Step Ups- Forward, laterally, backwards  4" step, 25 reps each movement, 2 UE support           Cone Taps foam  Foam, 10 cones, 3 sets, 2 UE                                                                                                                       Modalities                                                           Assessment: Patient tolerated session well, able to progress POC today to step ups today with adequate form and safety with 2 UE   Patient improving his interventions in the clinic, progressing to backward hurdles today, showing decreased step height today with activities, hitting into hurdles  Patient improving with FTEC on firm today, but shows a decrease in postural stability with postural sway, indicating decrease in vestibular balance  Patient demonstrated HEP exercises well today, states he will attempt performance at home  Method of balance loss is posterior, this direction is his preponderance to fall  Patient will continue to benefit from skilled intervention to facilitate increased safety in all ADLs  Plan: Continue PT per POC  Patient without complaints post treatment    (12/27/2018 - 2/21/2018)

## 2019-01-07 ENCOUNTER — HOSPITAL ENCOUNTER (OUTPATIENT)
Facility: HOSPITAL | Age: 74
Setting detail: OBSERVATION
Discharge: HOME HEALTH | End: 2019-01-08
Attending: EMERGENCY MEDICINE | Admitting: INTERNAL MEDICINE
Payer: COMMERCIAL

## 2019-01-07 ENCOUNTER — APPOINTMENT (EMERGENCY)
Dept: RADIOLOGY | Facility: HOSPITAL | Age: 74
End: 2019-01-07
Payer: COMMERCIAL

## 2019-01-07 DIAGNOSIS — R07.89 OTHER CHEST PAIN: ICD-10-CM

## 2019-01-07 DIAGNOSIS — R07.9 CHEST PAIN: Primary | ICD-10-CM

## 2019-01-07 DIAGNOSIS — R29.6 FREQUENT FALLS: ICD-10-CM

## 2019-01-07 DIAGNOSIS — I10 HYPERTENSION: ICD-10-CM

## 2019-01-07 LAB
ALBUMIN SERPL BCP-MCNC: 3.8 G/DL (ref 3.5–5)
ALP SERPL-CCNC: 104 U/L (ref 46–116)
ALT SERPL W P-5'-P-CCNC: 22 U/L (ref 12–78)
ANION GAP SERPL CALCULATED.3IONS-SCNC: 12 MMOL/L (ref 4–13)
APTT PPP: 26 SECONDS (ref 24–33)
AST SERPL W P-5'-P-CCNC: 25 U/L (ref 5–45)
BASOPHILS # BLD AUTO: 0.07 THOUSANDS/ΜL (ref 0–0.1)
BASOPHILS NFR BLD AUTO: 1 % (ref 0–1)
BILIRUB SERPL-MCNC: 0.4 MG/DL (ref 0.2–1)
BUN SERPL-MCNC: 28 MG/DL (ref 5–25)
CALCIUM SERPL-MCNC: 9.4 MG/DL (ref 8.3–10.1)
CHLORIDE SERPL-SCNC: 96 MMOL/L (ref 100–108)
CO2 SERPL-SCNC: 29 MMOL/L (ref 21–32)
CREAT SERPL-MCNC: 0.93 MG/DL (ref 0.6–1.3)
EOSINOPHIL # BLD AUTO: 0.22 THOUSAND/ΜL (ref 0–0.61)
EOSINOPHIL NFR BLD AUTO: 4 % (ref 0–6)
ERYTHROCYTE [DISTWIDTH] IN BLOOD BY AUTOMATED COUNT: 15 % (ref 11.6–15.1)
GFR SERPL CREATININE-BSD FRML MDRD: 80 ML/MIN/1.73SQ M
GLUCOSE SERPL-MCNC: 91 MG/DL (ref 65–140)
HCT VFR BLD AUTO: 38.5 % (ref 36.5–49.3)
HGB BLD-MCNC: 12.1 G/DL (ref 12–17)
IMM GRANULOCYTES # BLD AUTO: 0.01 THOUSAND/UL (ref 0–0.2)
IMM GRANULOCYTES NFR BLD AUTO: 0 % (ref 0–2)
INR PPP: 0.95 (ref 0.86–1.16)
LYMPHOCYTES # BLD AUTO: 1.23 THOUSANDS/ΜL (ref 0.6–4.47)
LYMPHOCYTES NFR BLD AUTO: 21 % (ref 14–44)
MCH RBC QN AUTO: 26.4 PG (ref 26.8–34.3)
MCHC RBC AUTO-ENTMCNC: 31.4 G/DL (ref 31.4–37.4)
MCV RBC AUTO: 84 FL (ref 82–98)
MONOCYTES # BLD AUTO: 0.47 THOUSAND/ΜL (ref 0.17–1.22)
MONOCYTES NFR BLD AUTO: 8 % (ref 4–12)
NEUTROPHILS # BLD AUTO: 3.88 THOUSANDS/ΜL (ref 1.85–7.62)
NEUTS SEG NFR BLD AUTO: 66 % (ref 43–75)
NRBC BLD AUTO-RTO: 0 /100 WBCS
NT-PROBNP SERPL-MCNC: 948 PG/ML
PLATELET # BLD AUTO: 206 THOUSANDS/UL (ref 149–390)
PMV BLD AUTO: 8.8 FL (ref 8.9–12.7)
POTASSIUM SERPL-SCNC: 4.4 MMOL/L (ref 3.5–5.3)
PROT SERPL-MCNC: 7.1 G/DL (ref 6.4–8.2)
PROTHROMBIN TIME: 10 SECONDS (ref 9.4–11.7)
RBC # BLD AUTO: 4.58 MILLION/UL (ref 3.88–5.62)
SODIUM SERPL-SCNC: 137 MMOL/L (ref 136–145)
TROPONIN I SERPL-MCNC: <0.02 NG/ML
WBC # BLD AUTO: 5.88 THOUSAND/UL (ref 4.31–10.16)

## 2019-01-07 PROCEDURE — 36415 COLL VENOUS BLD VENIPUNCTURE: CPT

## 2019-01-07 PROCEDURE — 85730 THROMBOPLASTIN TIME PARTIAL: CPT

## 2019-01-07 PROCEDURE — 84484 ASSAY OF TROPONIN QUANT: CPT

## 2019-01-07 PROCEDURE — 71045 X-RAY EXAM CHEST 1 VIEW: CPT

## 2019-01-07 PROCEDURE — 99285 EMERGENCY DEPT VISIT HI MDM: CPT

## 2019-01-07 PROCEDURE — 83880 ASSAY OF NATRIURETIC PEPTIDE: CPT

## 2019-01-07 PROCEDURE — 85025 COMPLETE CBC W/AUTO DIFF WBC: CPT

## 2019-01-07 PROCEDURE — 93005 ELECTROCARDIOGRAM TRACING: CPT

## 2019-01-07 PROCEDURE — 80053 COMPREHEN METABOLIC PANEL: CPT

## 2019-01-07 PROCEDURE — 85610 PROTHROMBIN TIME: CPT

## 2019-01-08 ENCOUNTER — APPOINTMENT (OUTPATIENT)
Dept: RADIOLOGY | Facility: HOSPITAL | Age: 74
End: 2019-01-08
Payer: COMMERCIAL

## 2019-01-08 ENCOUNTER — APPOINTMENT (OUTPATIENT)
Dept: PHYSICAL THERAPY | Facility: CLINIC | Age: 74
End: 2019-01-08
Payer: COMMERCIAL

## 2019-01-08 ENCOUNTER — APPOINTMENT (OUTPATIENT)
Dept: NON INVASIVE DIAGNOSTICS | Facility: HOSPITAL | Age: 74
End: 2019-01-08
Payer: COMMERCIAL

## 2019-01-08 VITALS
SYSTOLIC BLOOD PRESSURE: 168 MMHG | RESPIRATION RATE: 35 BRPM | WEIGHT: 145.06 LBS | BODY MASS INDEX: 23.31 KG/M2 | HEART RATE: 64 BPM | HEIGHT: 66 IN | TEMPERATURE: 97 F | DIASTOLIC BLOOD PRESSURE: 72 MMHG | OXYGEN SATURATION: 98 %

## 2019-01-08 PROBLEM — R07.89 OTHER CHEST PAIN: Status: ACTIVE | Noted: 2018-08-12

## 2019-01-08 LAB
ANION GAP SERPL CALCULATED.3IONS-SCNC: 10 MMOL/L (ref 4–13)
BASOPHILS # BLD AUTO: 0.06 THOUSANDS/ΜL (ref 0–0.1)
BASOPHILS NFR BLD AUTO: 1 % (ref 0–1)
BUN SERPL-MCNC: 20 MG/DL (ref 5–25)
CALCIUM SERPL-MCNC: 9.2 MG/DL (ref 8.3–10.1)
CHLORIDE SERPL-SCNC: 100 MMOL/L (ref 100–108)
CHOLEST SERPL-MCNC: 142 MG/DL (ref 50–200)
CO2 SERPL-SCNC: 26 MMOL/L (ref 21–32)
CREAT SERPL-MCNC: 0.76 MG/DL (ref 0.6–1.3)
EOSINOPHIL # BLD AUTO: 0.1 THOUSAND/ΜL (ref 0–0.61)
EOSINOPHIL NFR BLD AUTO: 2 % (ref 0–6)
ERYTHROCYTE [DISTWIDTH] IN BLOOD BY AUTOMATED COUNT: 15 % (ref 11.6–15.1)
GFR SERPL CREATININE-BSD FRML MDRD: 89 ML/MIN/1.73SQ M
GLUCOSE P FAST SERPL-MCNC: 94 MG/DL (ref 65–99)
GLUCOSE SERPL-MCNC: 94 MG/DL (ref 65–140)
HCT VFR BLD AUTO: 37 % (ref 36.5–49.3)
HDLC SERPL-MCNC: 47 MG/DL (ref 40–60)
HGB BLD-MCNC: 11.8 G/DL (ref 12–17)
IMM GRANULOCYTES # BLD AUTO: 0.01 THOUSAND/UL (ref 0–0.2)
IMM GRANULOCYTES NFR BLD AUTO: 0 % (ref 0–2)
LDLC SERPL CALC-MCNC: 83 MG/DL (ref 0–100)
LYMPHOCYTES # BLD AUTO: 0.9 THOUSANDS/ΜL (ref 0.6–4.47)
LYMPHOCYTES NFR BLD AUTO: 15 % (ref 14–44)
MCH RBC QN AUTO: 26.3 PG (ref 26.8–34.3)
MCHC RBC AUTO-ENTMCNC: 31.9 G/DL (ref 31.4–37.4)
MCV RBC AUTO: 82 FL (ref 82–98)
MONOCYTES # BLD AUTO: 0.4 THOUSAND/ΜL (ref 0.17–1.22)
MONOCYTES NFR BLD AUTO: 7 % (ref 4–12)
NEUTROPHILS # BLD AUTO: 4.44 THOUSANDS/ΜL (ref 1.85–7.62)
NEUTS SEG NFR BLD AUTO: 75 % (ref 43–75)
NONHDLC SERPL-MCNC: 95 MG/DL
NRBC BLD AUTO-RTO: 0 /100 WBCS
PLATELET # BLD AUTO: 217 THOUSANDS/UL (ref 149–390)
PMV BLD AUTO: 8.9 FL (ref 8.9–12.7)
POTASSIUM SERPL-SCNC: 3.8 MMOL/L (ref 3.5–5.3)
RBC # BLD AUTO: 4.49 MILLION/UL (ref 3.88–5.62)
SODIUM SERPL-SCNC: 136 MMOL/L (ref 136–145)
TRIGL SERPL-MCNC: 60 MG/DL
TROPONIN I SERPL-MCNC: 0.02 NG/ML
TROPONIN I SERPL-MCNC: <0.02 NG/ML
WBC # BLD AUTO: 5.91 THOUSAND/UL (ref 4.31–10.16)

## 2019-01-08 PROCEDURE — 85025 COMPLETE CBC W/AUTO DIFF WBC: CPT | Performed by: NURSE PRACTITIONER

## 2019-01-08 PROCEDURE — 87081 CULTURE SCREEN ONLY: CPT | Performed by: INTERNAL MEDICINE

## 2019-01-08 PROCEDURE — G8979 MOBILITY GOAL STATUS: HCPCS

## 2019-01-08 PROCEDURE — G8978 MOBILITY CURRENT STATUS: HCPCS

## 2019-01-08 PROCEDURE — 80061 LIPID PANEL: CPT | Performed by: NURSE PRACTITIONER

## 2019-01-08 PROCEDURE — 71250 CT THORAX DX C-: CPT

## 2019-01-08 PROCEDURE — G8987 SELF CARE CURRENT STATUS: HCPCS

## 2019-01-08 PROCEDURE — 84484 ASSAY OF TROPONIN QUANT: CPT | Performed by: NURSE PRACTITIONER

## 2019-01-08 PROCEDURE — 99236 HOSP IP/OBS SAME DATE HI 85: CPT | Performed by: INTERNAL MEDICINE

## 2019-01-08 PROCEDURE — 97530 THERAPEUTIC ACTIVITIES: CPT

## 2019-01-08 PROCEDURE — 97163 PT EVAL HIGH COMPLEX 45 MIN: CPT

## 2019-01-08 PROCEDURE — 97167 OT EVAL HIGH COMPLEX 60 MIN: CPT

## 2019-01-08 PROCEDURE — 80048 BASIC METABOLIC PNL TOTAL CA: CPT | Performed by: NURSE PRACTITIONER

## 2019-01-08 PROCEDURE — 93306 TTE W/DOPPLER COMPLETE: CPT

## 2019-01-08 PROCEDURE — G8988 SELF CARE GOAL STATUS: HCPCS

## 2019-01-08 PROCEDURE — 96374 THER/PROPH/DIAG INJ IV PUSH: CPT

## 2019-01-08 RX ORDER — HYDRALAZINE HYDROCHLORIDE 20 MG/ML
10 INJECTION INTRAMUSCULAR; INTRAVENOUS ONCE
Status: DISCONTINUED | OUTPATIENT
Start: 2019-01-08 | End: 2019-01-08

## 2019-01-08 RX ORDER — ACETAMINOPHEN 325 MG/1
650 TABLET ORAL EVERY 6 HOURS PRN
Status: DISCONTINUED | OUTPATIENT
Start: 2019-01-08 | End: 2019-01-08 | Stop reason: HOSPADM

## 2019-01-08 RX ORDER — LABETALOL 20 MG/4 ML (5 MG/ML) INTRAVENOUS SYRINGE
10 ONCE
Status: DISCONTINUED | OUTPATIENT
Start: 2019-01-08 | End: 2019-01-08

## 2019-01-08 RX ORDER — ONDANSETRON 2 MG/ML
INJECTION INTRAMUSCULAR; INTRAVENOUS
Status: COMPLETED
Start: 2019-01-08 | End: 2019-01-08

## 2019-01-08 RX ORDER — CELECOXIB 100 MG/1
200 CAPSULE ORAL DAILY
Status: DISCONTINUED | OUTPATIENT
Start: 2019-01-08 | End: 2019-01-08 | Stop reason: HOSPADM

## 2019-01-08 RX ORDER — ONDANSETRON 2 MG/ML
4 INJECTION INTRAMUSCULAR; INTRAVENOUS EVERY 6 HOURS PRN
Status: DISCONTINUED | OUTPATIENT
Start: 2019-01-08 | End: 2019-01-08 | Stop reason: HOSPADM

## 2019-01-08 RX ORDER — TIMOLOL MALEATE 5 MG/ML
1 SOLUTION/ DROPS OPHTHALMIC DAILY
Status: DISCONTINUED | OUTPATIENT
Start: 2019-01-08 | End: 2019-01-08 | Stop reason: HOSPADM

## 2019-01-08 RX ORDER — HYDRALAZINE HYDROCHLORIDE 20 MG/ML
5 INJECTION INTRAMUSCULAR; INTRAVENOUS EVERY 6 HOURS PRN
Status: DISCONTINUED | OUTPATIENT
Start: 2019-01-08 | End: 2019-01-08 | Stop reason: HOSPADM

## 2019-01-08 RX ORDER — ASPIRIN 81 MG/1
81 TABLET, CHEWABLE ORAL DAILY
Status: DISCONTINUED | OUTPATIENT
Start: 2019-01-09 | End: 2019-01-08 | Stop reason: HOSPADM

## 2019-01-08 RX ORDER — ONDANSETRON 2 MG/ML
4 INJECTION INTRAMUSCULAR; INTRAVENOUS ONCE
Status: COMPLETED | OUTPATIENT
Start: 2019-01-08 | End: 2019-01-08

## 2019-01-08 RX ORDER — HYDRALAZINE HYDROCHLORIDE 20 MG/ML
5 INJECTION INTRAMUSCULAR; INTRAVENOUS ONCE
Status: COMPLETED | OUTPATIENT
Start: 2019-01-08 | End: 2019-01-08

## 2019-01-08 RX ORDER — HYDRALAZINE HYDROCHLORIDE 20 MG/ML
10 INJECTION INTRAMUSCULAR; INTRAVENOUS ONCE
Status: COMPLETED | OUTPATIENT
Start: 2019-01-08 | End: 2019-01-08

## 2019-01-08 RX ORDER — OXYCODONE HYDROCHLORIDE AND ACETAMINOPHEN 5; 325 MG/1; MG/1
1 TABLET ORAL EVERY 8 HOURS PRN
Qty: 15 TABLET | Refills: 0 | Status: SHIPPED | OUTPATIENT
Start: 2019-01-08 | End: 2019-01-11

## 2019-01-08 RX ADMIN — HYDRALAZINE HYDROCHLORIDE 5 MG: 20 INJECTION INTRAMUSCULAR; INTRAVENOUS at 00:09

## 2019-01-08 RX ADMIN — CELECOXIB 200 MG: 100 CAPSULE ORAL at 09:10

## 2019-01-08 RX ADMIN — ENOXAPARIN SODIUM 40 MG: 40 INJECTION SUBCUTANEOUS at 09:10

## 2019-01-08 RX ADMIN — ONDANSETRON 4 MG: 2 INJECTION INTRAMUSCULAR; INTRAVENOUS at 02:24

## 2019-01-08 RX ADMIN — ACETAMINOPHEN 650 MG: 325 TABLET, FILM COATED ORAL at 16:18

## 2019-01-08 RX ADMIN — HYDRALAZINE HYDROCHLORIDE 10 MG: 20 INJECTION INTRAMUSCULAR; INTRAVENOUS at 01:21

## 2019-01-08 RX ADMIN — TIMOLOL MALEATE 1 DROP: 5 SOLUTION/ DROPS OPHTHALMIC at 09:12

## 2019-01-08 RX ADMIN — SERTRALINE HYDROCHLORIDE 50 MG: 50 TABLET ORAL at 09:11

## 2019-01-08 NOTE — ASSESSMENT & PLAN NOTE
On Florinef for orthostasis and hyponatremia, profoundly hypertensive  · Hold Florinef for now  · Hydralazine p r n

## 2019-01-08 NOTE — ASSESSMENT & PLAN NOTE
Patient had 3 falls in the month of December, 2 of which he visited the ER for   Has home PT/OT which just ended  Physical therapy and occupational therapy evaluation

## 2019-01-08 NOTE — SOCIAL WORK
SW met with pt and wife to assess needs and discuss plans  Discussed goals of making sure pt's needs are met upon discharge  STR placement is being recommended by Dr Vani Middleton and PT/OT  Pt lives with wife  Both use walkers to ambulate  Pt had been driving himself and wife to outpatient therapy  Wife stated that pt will not be driving at this time since he is not up to it  Wife has been in contact with friends that can assist with transportation home from hospital and to follow up doctor appointments  SW discussed recommendation for short term rehab with pt and wife  They had declined rehab with therapist earlier and are still declining with SW  Their preference is home care/therapy with [facility]  Pt has had services through [facility] in the past   SW discussed above with Dr Vani Middleton  Referral will be made to [facility] for services  No other needs expressed by pt or wife  Will follow until discharge

## 2019-01-08 NOTE — UTILIZATION REVIEW
Initial Clinical Review    Admission: Date/Time/Statement: 1 8 19 0048   Orders Placed This Encounter   Procedures    Place in Observation (expected length of stay for this patient is less than two midnights)     Standing Status:   Standing     Number of Occurrences:   1     Order Specific Question:   Admitting Physician     Answer:   Cecilia Thompson     Order Specific Question:   Level of Care     Answer:   Med Surg [16]     ED: Date/Time/Mode of Arrival:   ED Arrival Information     Expected Arrival Acuity Means of Arrival Escorted By Service Admission Type    - 1/7/2019 22:55 [facility] [address] Emergency    Arrival Complaint    chest pain        Chief Complaint:   Chief Complaint   Patient presents with    Chest Pain     Patient complains on left sided chest pain  Patient fell two weeks ago and had significant bruising on left side of chest and ribs  Patient does not take any blood thinners and has no other complaints  History of Illness: Adam Kearns is a 74 y o  male with a PMH of aortic stenosis, cataracts, depression, id, orthostasis, hypotension who presents with left-sided chest pain  Patient states he was lying in bed when he suddenly developed left-sided chest wall discomfort  There was no radiation of pain  There was no associated shortness of breath, dizziness  He denies fevers or cough  Upon physical exam patient is found to be ecchymotic across entire chest wall, bruising appears to be old  Patient states that he fell 3 times in December  The 1st 2 times he visited the emergency department, the 3rd time his neighbor picked him up and put him back in the bed  The 3rd fall was the 1 that resulted in the diffuse ecchymoses found on exam   Pain to chest wall is reproducible  He is admitted for further management      ED Vital Signs:   ED Triage Vitals   Temperature Pulse Respirations Blood Pressure SpO2   01/07/19 2305 01/07/19 2300 01/07/19 2300 01/07/19 2300 01/07/19 2300   97 8 °F (36 6 °C) 64 18 (!) 246/108 100 %      Temp Source Heart Rate Source Patient Position - Orthostatic VS BP Location FiO2 (%)   01/07/19 2305 01/07/19 2300 01/07/19 2300 01/07/19 2300 --   Tympanic Monitor Lying Left arm       Pain Score       01/08/19 0237       No Pain        Wt Readings from Last 1 Encounters:   01/08/19 65 8 kg (145 lb 1 oz)     Vital Signs (abnormal): HR 50   Pertinent Labs/Diagnostic Test Results: TROPONIN <0 02 X3  ,HGB 11 8   CXR NAD   CT CHEST  Minimally displaced fractures of the left posterior 3rd and 4th ribs  2   Nondisplaced fracture of the right posterolateral 7th rib  ED Treatment:   Medication Administration from 01/07/2019 2255 to 01/08/2019 5409       Date/Time Order Dose Route Action Action by Comments     01/08/2019 0009 hydrALAZINE (APRESOLINE) injection 5 mg 5 mg Intravenous Given Keeley Maddox RN      01/08/2019 0121 hydrALAZINE (APRESOLINE) injection 10 mg 10 mg Intravenous Given Keeley Maddox RN      01/08/2019 0224 ondansetron (ZOFRAN) injection 4 mg 4 mg Intravenous Given Keeley Maddox RN         Past Medical/Surgical History:    Active Ambulatory Problems     Diagnosis Date Noted    Benign hypertension 11/28/2016    Aortic stenosis, moderate 12/07/2016    Acquired deformity of right foot 05/07/2018    Peripheral arteriosclerosis ([facility] ) 05/07/2018    Metatarsalgia of both feet 05/07/2018    Congenital pes planus 05/07/2018    Pain in both feet 06/11/2018    Onychomycosis 06/11/2018    Right foot pain 06/11/2018    Frequent falls 08/12/2018    Hyponatremia 08/12/2018    Other chest pain 08/12/2018    Chronic headache 08/12/2018    Depression 08/12/2018    Urinary frequency 08/14/2018    Orthostasis 08/16/2018     Resolved Ambulatory Problems     Diagnosis Date Noted    Chest pain 11/28/2016    Shingles 08/12/2018     Past Medical History:   Diagnosis Date    Abnormal blood chemistry 09/21/2009    Aortic stenosis, moderate     Arthritis 05/09/2011    Backache     Benign essential HTN     Cataract     Depression with anxiety     Diarrhea     [facility] 09/09/2011    Generalized anxiety disorder 08/10/2009    Glaucoma     Hearing problem     History of herniated intervertebral disc 04/28/2004    Memory loss     Prostate cancer ([facility] [address] [facility]     [facility] 04/28/2004    Skin cancer of nose     Skin cancer of nose      Admitting Diagnosis: Chest pain [R07 9]  Hypertension [I10]  Age/Sex: 74 y o  male  Assessment/Plan:   Other chest pain   Assessment & Plan     Patient presents to the emergency department with complaints of left-sided chest pain  This began while he was at rest at home in bed  There was no radiation, no associated shortness of breath  Chest pain is reproducible with palpation  Patient has diffuse ecchymosis to left side of the body from a fall on December 29th which he did not seek medical attention for  Troponin was negative  EKG revealed SB with no ischemic changes  CT of the chest revealed a minimally displaced fracture of the left posterior 3rd and 4th ribs, nondisplaced fracture of the right 7th rib  When compared to previous imaging done here, right rib fracture is old, left rib fractures are new  Pain is likely secondary due to fall and fractures, not likely cardiac but is admitted to rule out ACS  · Admit to Medicine  · Serial EKGs and troponin  · Aspirin, statin  · Echocardiogram  · Lipid panel in the a m    · Consider cardiology consultation depending on clinical course   Orthostasis   Assessment & Plan     Hold Flormundo   Depression   Assessment & Plan     Continue Zoloft   Frequent falls   Assessment & Plan     Patient had 3 falls in the month of December, 2 of which he visited the ER for   Has home PT/OT which just ended  Physical therapy and occupational therapy evaluation   Benign hypertension   Assessment & Plan     On Florinef for orthostasis and hyponatremia, profoundly hypertensive    · Hold Florinef for now  · Hydralazine p r n     VTE Prophylaxis: Enoxaparin (Lovenox)  / sequential compression device   Code Status: Level 1 - Full Code       Admission Orders:  OBSERVATION  TELE MON  ECHO  PT OT  Scheduled Meds:   Current Facility-Administered Medications:  acetaminophen 650 mg Oral Q6H PRN Ninfa Patel MD   [START ON 1/9/2019] aspirin 81 mg Oral Daily Wade Sands, CRNP   bimatoprost 1 drop Both Eyes HS Wade Sands, EMILYNP   celecoxib 200 mg Oral Daily Wade Castillot, CRNP   enoxaparin 40 mg Subcutaneous Daily Wade Sands, CRNP   hydrALAZINE 5 mg Intravenous Q6H PRN Wade Sands, CRNP   ondansetron 4 mg Intravenous Q6H PRN Wade Sands, CRNP   sertraline 50 mg Oral Daily Wade Sands, CRNP   timolol 1 drop Both Eyes Daily Wade Sands, MISAEL     Continuous Infusions:    PRN Meds:   acetaminophen    hydrALAZINE    ondansetron

## 2019-01-08 NOTE — ED PROVIDER NOTES
History  Chief Complaint   Patient presents with    Chest Pain     Patient complains on left sided chest pain  Patient fell two weeks ago and had significant bruising on left side of chest and ribs  Patient does not take any blood thinners and has no other complaints  Pt in ER with c/o left sided chest pain that began suddenly at approx 10p  He denies radiation of pain  Pt fell onto his left side approx 2wks ago after which he was evaluated in the ER  Pt was given ASA 82mg x 4tabs by his wife prior to arrival in the ER  Prior to Admission Medications   Prescriptions Last Dose Informant Patient Reported?  Taking?   bimatoprost (LUMIGAN) 0 01 % ophthalmic drops  Self Yes Yes   Sig: Administer 1 drop to both eyes daily at bedtime     brimonidine-timolol (COMBIGAN) 0 2-0 5 %  Self Yes Yes   Sig: Administer 1 drop to both eyes 2 (two) times a day   celecoxib (CeleBREX) 200 mg capsule   No Yes   Sig: Take 1 capsule (200 mg total) by mouth daily As needed for headache, joint or muscle pain   fludrocortisone (FLORINEF) 0 1 mg tablet  Self No Yes   Sig: Take 1 tablet (0 1 mg total) by mouth daily   sertraline (ZOLOFT) 50 mg tablet   No Yes   Sig: Take 1 tablet (50 mg total) by mouth daily      Facility-Administered Medications: None       Past Medical History:   Diagnosis Date    Abnormal blood chemistry 09/21/2009    Aortic stenosis, moderate     Arthritis 05/09/2011    localized primary osteoarthritis of lower leg    Backache     Benign essential HTN     last assessed 12/28/17    Cataract     Depression with anxiety     Diarrhea     [facility] 09/09/2011    Right Leg    Generalized anxiety disorder 08/10/2009    Glaucoma     last assesssed 2/11/13    Hearing problem     History of herniated intervertebral disc 04/28/2004    Memory loss     [facility] [address] [facility]     PVD (peripheral vascular disease) ([facility] [facility] ) 04/28/2004    Skin cancer of nose     Skin cancer of nose Past Surgical History:   Procedure Laterality Date    BUNIONECTOMY      simple bunion exostectomy (silver procedure)    CATARACT EXTRACTION      resolved 2012    CYSTOSCOPY W/ URETERAL STENT PLACEMENT      EYE SURGERY Bilateral     laser, left-2012 , right-2015    FRACTURE SURGERY      spinal, resolved 8/15/10    HERNIA REPAIR      inguinal sliding    OTHER SURGICAL HISTORY      needle cath placement for brachyther applic into genitalia    PROSTATE BIOPSY      needle bx    RENAL ARTERY STENT      REPLACEMENT TOTAL KNEE BILATERAL      right-2012 , left-2016       Family History   Problem Relation Age of Onset    Alzheimer's disease Mother     Stroke Father         CVA    Diabetes Sister      I have reviewed and agree with the history as documented  Social History   Substance Use Topics    Smoking status: Never Smoker    Smokeless tobacco: Never Used    Alcohol use No        Review of Systems   Constitutional: Negative for chills and fever  Respiratory: Negative for cough, shortness of breath and wheezing  Cardiovascular: Positive for chest pain  Negative for palpitations  Gastrointestinal: Negative for abdominal pain, constipation, diarrhea, nausea and vomiting  Genitourinary: Negative for dysuria, flank pain, hematuria and urgency  Musculoskeletal: Negative for back pain  Skin: Negative for color change and rash  All other systems reviewed and are negative  Physical Exam  Physical Exam   Constitutional: He is oriented to person, place, and time  He appears well-developed and well-nourished  HENT:   Head: Normocephalic and atraumatic  Eyes: Pupils are equal, round, and reactive to light  EOM are normal    Cardiovascular: Normal rate, regular rhythm and normal heart sounds  Pulmonary/Chest: Effort normal and breath sounds normal    Abdominal: Soft  Bowel sounds are normal  He exhibits no distension and no mass  There is no tenderness   There is no rebound and no guarding  Neurological: He is alert and oriented to person, place, and time  Skin: Skin is warm and dry  Ecchymoses noted to left chest wall  No TTP   Psychiatric: He has a normal mood and affect  His behavior is normal  Judgment and thought content normal    Nursing note and vitals reviewed        Vital Signs  ED Triage Vitals   Temperature Pulse Respirations Blood Pressure SpO2   01/07/19 2305 01/07/19 2300 01/07/19 2300 01/07/19 2300 01/07/19 2300   97 8 °F (36 6 °C) 64 18 (!) 246/108 100 %      Temp Source Heart Rate Source Patient Position - Orthostatic VS BP Location FiO2 (%)   01/07/19 2305 01/07/19 2300 01/07/19 2300 01/07/19 2300 --   Tympanic Monitor Lying Left arm       Pain Score       01/08/19 0237       No Pain           Vitals:    01/08/19 0145 01/08/19 0237 01/08/19 0400 01/08/19 0600   BP: 158/69 (!) 182/81 163/75 142/67   Pulse: 56 57 55 62   Patient Position - Orthostatic VS:  Lying         Visual Acuity      ED Medications  Medications   celecoxib (CeleBREX) capsule 200 mg (not administered)   sertraline (ZOLOFT) tablet 50 mg (not administered)   bimatoprost (LUMIGAN) 0 01 % ophthalmic solution 1 drop (not administered)   timolol (TIMOPTIC) 0 5 % ophthalmic solution 1 drop (not administered)   ondansetron (ZOFRAN) injection 4 mg (not administered)   aspirin chewable tablet 81 mg (not administered)   enoxaparin (LOVENOX) subcutaneous injection 40 mg (not administered)   hydrALAZINE (APRESOLINE) injection 5 mg (not administered)   hydrALAZINE (APRESOLINE) injection 5 mg (5 mg Intravenous Given 1/8/19 0009)   hydrALAZINE (APRESOLINE) injection 10 mg (10 mg Intravenous Given 1/8/19 0121)   ondansetron (ZOFRAN) injection 4 mg (4 mg Intravenous Given 1/8/19 0224)       Diagnostic Studies  Results Reviewed     Procedure Component Value Units Date/Time    Comprehensive metabolic panel [[id number]]  (Abnormal) Collected:  01/07/19 2309    Lab Status:  Final result Specimen:  Blood from Arm, Right Updated:  01/07/19 2341     Sodium 137 mmol/L      Potassium 4 4 mmol/L      Chloride 96 (L) mmol/L      CO2 29 mmol/L      ANION GAP 12 mmol/L      BUN 28 (H) mg/dL      Creatinine 0 93 mg/dL      Glucose 91 mg/dL      Calcium 9 4 mg/dL      AST 25 U/L      ALT 22 U/L      Alkaline Phosphatase 104 U/L      Total Protein 7 1 g/dL      Albumin 3 8 g/dL      Total Bilirubin 0 40 mg/dL      eGFR 80 ml/min/1 73sq m     Narrative:         National Kidney Disease Education Program recommendations are as follows:  GFR calculation is accurate only with a steady state creatinine  Chronic Kidney disease less than 60 ml/min/1 73 sq  meters  Kidney failure less than 15 ml/min/1 73 sq  meters      B-type natriuretic peptide [[id number]]  (Abnormal) Collected:  01/07/19 2309    Lab Status:  Final result Specimen:  Blood from Arm, Right Updated:  01/07/19 2341     NT-proBNP 948 (H) pg/mL     Troponin I [[id number]]  (Normal) Collected:  01/07/19 2309    Lab Status:  Final result Specimen:  Blood from Arm, Right Updated:  01/07/19 2336     Troponin I <0 02 ng/mL     Protime-INR [[id number]]  (Normal) Collected:  01/07/19 2309    Lab Status:  Final result Specimen:  Blood from Arm, Right Updated:  01/07/19 2328     Protime 10 0 seconds      INR 0 95    APTT [[id number]]  (Normal) Collected:  01/07/19 2309    Lab Status:  Final result Specimen:  Blood from Arm, Right Updated:  01/07/19 2328     PTT 26 seconds     CBC and differential [[id number]]  (Abnormal) Collected:  01/07/19 2309    Lab Status:  Final result Specimen:  Blood from Arm, Right Updated:  01/07/19 2314     WBC 5 88 Thousand/uL      RBC 4 58 Million/uL      Hemoglobin 12 1 g/dL      Hematocrit 38 5 %      MCV 84 fL      MCH 26 4 (L) pg      MCHC 31 4 g/dL      RDW 15 0 %      MPV 8 8 (L) fL      Platelets 942 Thousands/uL      nRBC 0 /100 WBCs      Neutrophils Relative 66 %      Immat GRANS % 0 %      Lymphocytes Relative 21 %      Monocytes Relative 8 %      Eosinophils Relative 4 %      Basophils Relative 1 %      Neutrophils Absolute 3 88 Thousands/µL      Immature Grans Absolute 0 01 Thousand/uL      Lymphocytes Absolute 1 23 Thousands/µL      Monocytes Absolute 0 47 Thousand/µL      Eosinophils Absolute 0 22 Thousand/µL      Basophils Absolute 0 07 Thousands/µL                  CT chest wo contrast   Final Result by Parisa Tao MD (01/08 8334)      1  Minimally displaced fractures of the left posterior 3rd and 4th ribs  2   Nondisplaced fracture of the right posterolateral 7th rib  Workstation performed: [id number]         X-ray chest 1 view portable   ED Interpretation by Cherise Hernández DO (01/08 0006)   nad                 Procedures  Procedures       Phone Contacts  ED Phone Contact    ED Course                               MDM  CritCare Time    Disposition  Final diagnoses:   Chest pain   Hypertension     Time reflects when diagnosis was documented in both MDM as applicable and the Disposition within this note     Time User Action Codes Description Comment    1/8/2019 12:45 AM Hermina [place] O Add [R07 9] Chest pain     1/8/2019 12:45 AM Hermina [place] Add [T86 19,  I12 9] Hypertensive arterionephrosclerosis of transplanted kidney     1/8/2019 12:45 AM Oyesanmi, Zach Simper Remove [T86 19,  I12 9] Hypertensive arterionephrosclerosis of transplanted kidney     1/8/2019 12:46 AM Oyesanmi, [address] [place] Hypertension       ED Disposition     ED Disposition Condition Comment    Admit  Case was discussed with Filippo Tomas NP and the patient's admission status was agreed to be Admission Status: observation status to the service of Dr Lynn Mcpherson           Follow-up Information    None         Current Discharge Medication List      CONTINUE these medications which have NOT CHANGED    Details   bimatoprost (LUMIGAN) 0 01 % ophthalmic drops Administer 1 drop to both eyes daily at bedtime        brimonidine-timolol (COMBIGAN) 0 2-0 5 % Administer 1 drop to both eyes 2 (two) times a day      celecoxib (CeleBREX) 200 mg capsule Take 1 capsule (200 mg total) by mouth daily As needed for headache, joint or muscle pain  Qty: 30 capsule, Refills: 1    Associated Diagnoses: Arthritis      fludrocortisone (FLORINEF) 0 1 mg tablet Take 1 tablet (0 1 mg total) by mouth daily  Qty: 30 tablet, Refills: 5    Associated Diagnoses: Hyponatremia; Frequent falls; Orthostasis      sertraline (ZOLOFT) 50 mg tablet Take 1 tablet (50 mg total) by mouth daily  Qty: 90 tablet, Refills: 1    Comments: Please fill for 3mth suppy-#90  Associated Diagnoses: Anxiety and depression           No discharge procedures on file      ED Provider  Electronically Signed by           Luzmaria Woods DO  01/08/19 3486

## 2019-01-08 NOTE — PHYSICAL THERAPY NOTE
PT EVALUATION       01/08/19 1420   Note Type   Note type Eval/Treat   Pain Assessment   Pain Assessment No/denies pain   Home Living   Type of [address] Two level;1/2 bath on main level  (pt washes at sink)   Bathroom Toilet Standard   Bathroom Equipment (commode over toilet)   Home Equipment Walker;Cane   Prior Function   Level of [place] (ambulatory with rolling walker, several falls in past month)   Lives With [place] Help From Family;Friend(s)  (attending balance center)   Falls in the last 6 months 1 to 4   Restrictions/Precautions   Other Precautions Fall Risk; Chair Alarm; Bed Alarm;Telemetry;Hard of hearing   General   Additional Pertinent History Pt admitted with chest pain now feeling "good"   Family/Caregiver Present Yes  (wife)   Cognition   Overall Cognitive Status WFL   Orientation Level Oriented to person;Oriented to place;Oriented to situation   RLE Assessment   RLE Assessment (ROM WFL, MMT hip 3-/5, knee 4-/5, ankle 3+/5)   LLE Assessment   LLE Assessment (ROM WFL, MMT hip 3-/5, knee 4-/5, ankle 3+/5)   Transfers   Sit to Stand (min assist/supervision)   Additional items Verbal cues  (hand placement)   Stand to Sit (min assist/supervision)   Additional items Verbal cues  (hand placement)   Ambulation/Elevation   Gait pattern (L LE/foot IR, short step length)   Gait Assistance (min assist)   Assistive Device Rolling walker   Distance 15 feet with change in direction   Balance   Static Sitting Fair +   Dynamic Sitting Fair   Static Standing Fair   Dynamic Standing Fair   Activity Tolerance   Activity Tolerance Patient tolerated treatment well   Medical Staff Made Aware pt ambulatory with walker with supervision   Assessment   Prognosis Good   Problem List Decreased strength;Decreased endurance; Impaired balance;Decreased mobility   Assessment Patient seen for Physical Therapy evaluation  Patient admitted with Other chest pain    Comorbidities affecting patient's physical performance include: htn, frequent falls, depression, orthostasis, urinary frequency, B SELENE/TKA  Personal factors affecting patient at time of initial evaluation include: lives in 2 story house, ambulating with assistive device, stairs to enter home, inability to navigate community distances, positive fall history, hearing impairments and depression  Prior to admission, patient was independent with functional mobility with rolling walker, history of recent falls  Please find objective findings from Physical Therapy assessment regarding body systems outlined above with impairments and limitations including weakness, impaired balance, decreased activity tolerance, decreased functional mobility tolerance and fall risk  The Barthel Index was used as a functional outcome tool presenting with a score of 45 today indicating marked limitations of functional mobility and ADLS  Patient's clinical presentation is currently unstable/unpredictable as seen in patient's presentation of increased fall risk and new onset of impairment of functional mobility  Pt would benefit from continued Physical Therapy treatment to address deficits as defined above and maximize level of functional mobility  As demonstrated by objective findings, the assigned level of complexity for this evaluation is high  PATIENT HAS HAD MULTIPLE FALLS LATELY  PATIENT AND WIFE WILL NOT CONSIDER STR, WANT HOME PT EVEN AFTER EDUCATON  EDUCATED PATIENT AND WIFE THAT HE WILL LIKELY FALL AGAIN     Goals   Patient Goals "to go home "   STG Expiration Date 01/15/19   Short Term Goal #1 independent bed mobility, transfers, ambulation with rolling walker indoor level surfaces 100 feet so pt can negotiate his home safely, supervision up and down 4 steps   LTG Expiration Date 01/22/19   Long Term Goal #1 no falls, improve standing static balance to at least good to decrease risk of falls   Long Term Goal #2 pt will ambulate outdoor surfaces independently 150 feet so pt can get to OP PT   Plan   Treatment/Interventions ADL retraining;Functional transfer training;LE strengthening/ROM; Elevations; Therapeutic exercise; Endurance training;Gait training;Bed mobility; Equipment eval/education;Patient/family training   PT Frequency 5x/wk   Recommendation   Recommendation (STR however pt/wife decline; so recommend home PT)   Equipment Recommended (pt has a walker)   Barthel Index   Feeding 5   Bathing 0   Grooming Score 5   Dressing Score 5   Bladder Score 5   Bowels Score 10   Toilet Use Score 5   Transfers (Bed/Chair) Score 10   Mobility (Level Surface) Score 0   Stairs Score 0   Barthel Index Score 39   Licensure   [place] License Number  Savanah Sanford PT [id number]       Time In: 4221  Time Out:1420  Total Time:  10      S:  "ready to go home" "feeling good"  O:  Pt initially required min assist to stand from the chair but then was able to do so with close supervision/verbal cues 2 times without balance loss  Pt ambulated 2x100 feet with walker and slow aranza/no jamaal balance loss  A:  Pt's functional performance is improved from this morning as pt required mod assist to transfer with OT and now min a/supervision to ambulate with PT  Pt remains at high risk to fall but pt declines STR  If patient goes home recommend home PT for balance/home safety as pt has even fallen on his way to outpatient  PT in the past     P:  Continue PT for balance, gait, safety awareness      Isamar Hampton, PT [id number]

## 2019-01-08 NOTE — SOCIAL WORK
Discharge ordered  Pt will be returning home with wife and [facility]  [facility] notified of discharge and AVS/F2F sent to agency  No other needs expressed prior to discharge

## 2019-01-08 NOTE — PLAN OF CARE
Problem: DISCHARGE PLANNING - CARE MANAGEMENT  Goal: Discharge to post-acute care or home with appropriate resources  INTERVENTIONS:  - Conduct assessment to determine patient/family and health care team treatment goals, and need for post-acute services based on payer coverage, community resources, and patient preferences, and barriers to discharge  - Address psychosocial, clinical, and financial barriers to discharge as identified in assessment in conjunction with the patient/family and health care team  - Arrange appropriate level of post-acute services according to patient's   needs and preference and payer coverage in collaboration with the physician and health care team  - Communicate with and update the patient/family, physician, and health care team regarding progress on the discharge plan  - Arrange appropriate transportation to post-acute venues    Vee Bland STR  Outcome: Progressing  Referral made to [facility]

## 2019-01-08 NOTE — H&P
H&P- Linda Melendez 1945, 74 y o  male MRN: [id number]    Unit/Bed#: ICU 05 Encounter: [id number]    Primary Care Provider: Lucia Chavez MD   Date and time admitted to hospital: 1/7/2019 11:02 PM        * Other chest pain   Assessment & Plan    Patient presents to the emergency department with complaints of left-sided chest pain  This began while he was at rest at home in bed  There was no radiation, no associated shortness of breath  Chest pain is reproducible with palpation  Patient has diffuse ecchymosis to left side of the body from a fall on December 29th which he did not seek medical attention for  Troponin was negative  EKG revealed SB with no ischemic changes  CT of the chest revealed a minimally displaced fracture of the left posterior 3rd and 4th ribs, nondisplaced fracture of the right 7th rib  When compared to previous imaging done here, right rib fracture is old, left rib fractures are new  Pain is likely secondary due to fall and fractures, not likely cardiac but is admitted to rule out ACS  · Admit to Medicine  · Serial EKGs and troponin  · Aspirin, statin  · Echocardiogram  · Lipid panel in the a m  · Consider cardiology consultation depending on clinical course     Orthostasis   Assessment & Plan    Hold Florinef     Depression   Assessment & Plan    Continue Zoloft     Frequent falls   Assessment & Plan    Patient had 3 falls in the month of December, 2 of which he visited the ER for   Has home PT/OT which just ended  Physical therapy and occupational therapy evaluation     Benign hypertension   Assessment & Plan    On Florinef for orthostasis and hyponatremia, profoundly hypertensive  · Hold Florinef for now  · Hydralazine p r n         VTE Prophylaxis: Enoxaparin (Lovenox)  / sequential compression device   Code Status: Level 1 - Full Code    Anticipated Length of Stay:  Patient will be admitted on an Observation basis with an anticipated length of stay of  Less than 2 midnights  Justification for Hospital Stay: chest pain rule out ACS, HTN    Total Time for Visit, including Counseling / Coordination of Care: 20 minutes  Greater than 50% of this total time spent on direct patient counseling and coordination of care  Chief Complaint:   Chest Pain (Patient complains on left sided chest pain  Patient fell two weeks ago and had significant bruising on left side of chest and ribs  Patient does not take any blood thinners and has no other complaints )      History of Present Illness:    Citlali Larson is a 74 y o  male with a PMH of aortic stenosis, cataracts, depression, id, orthostasis, hypotension who presents with left-sided chest pain  Patient states he was lying in bed when he suddenly developed left-sided chest wall discomfort  There was no radiation of pain  There was no associated shortness of breath, dizziness  He denies fevers or cough  Upon physical exam patient is found to be ecchymotic across entire chest wall, bruising appears to be old  Patient states that he fell 3 times in December  The 1st 2 times he visited the emergency department, the 3rd time his neighbor picked him up and put him back in the bed  The 3rd fall was the 1 that resulted in the diffuse ecchymoses found on exam   Pain to chest wall is reproducible  He is admitted for further management  Review of Systems:    Review of Systems   Constitutional: Negative  HENT: Negative  Eyes: Negative  Respiratory: Negative  Cardiovascular: Positive for chest pain  Gastrointestinal: Negative  Endocrine: Negative  Genitourinary: Negative  Musculoskeletal: Positive for gait problem  Skin:        ecchymosis   Allergic/Immunologic: Negative  Hematological: Negative  Psychiatric/Behavioral: Negative          Past Medical and Surgical History:     Past Medical History:   Diagnosis Date    Abnormal blood chemistry 09/21/2009    Aortic stenosis, moderate     Arthritis 05/09/2011    localized primary osteoarthritis of lower leg    Backache     Benign essential HTN     last assessed 12/28/17    Cataract     Depression with anxiety     Diarrhea     DVT [facility] thrombosis[facility]) 09/09/2011    Right Leg    Generalized anxiety disorder 08/10/2009    Glaucoma     last assesssed 2/11/13    Hearing problem     History of herniated intervertebral disc 04/28/2004    Memory loss     Prostate cancer [facility] [address] [facility]     PVD (peripheral vascular disease) ([facility] [address] ) 04/28/2004    Skin cancer of nose     Skin cancer of nose        Past Surgical History:   Procedure Laterality Date    BUNIONECTOMY      simple bunion exostectomy (silver procedure)    CATARACT EXTRACTION      resolved 2012    CYSTOSCOPY W/ URETERAL STENT PLACEMENT      EYE SURGERY Bilateral     laser, left-2012 , right-2015    FRACTURE SURGERY      spinal, resolved 8/15/10    HERNIA REPAIR      inguinal sliding    OTHER SURGICAL HISTORY      needle cath placement for brachyther applic into genitalia    PROSTATE BIOPSY      needle bx    RENAL ARTERY STENT      REPLACEMENT TOTAL KNEE BILATERAL      right-2012 , left-2016       Meds/Allergies:    Prior to Admission medications    Medication Sig Start Date End Date Taking?  Authorizing Provider   bimatoprost (LUMIGAN) 0 01 % ophthalmic drops Administer 1 drop to both eyes daily at bedtime     Yes Historical Provider, MD   brimonidine-timolol (COMBIGAN) 0 2-0 5 % Administer 1 drop to both eyes 2 (two) times a day   Yes Historical Provider, MD   celecoxib (CeleBREX) 200 mg capsule Take 1 capsule (200 mg total) by mouth daily As needed for headache, joint or muscle pain 11/14/18  Yes May Siegel MD   fludrocortisone (FLORINEF) 0 1 mg tablet Take 1 tablet (0 1 mg total) by mouth daily 8/23/18  Yes Kamilah King DO   sertraline (ZOLOFT) 50 mg tablet Take 1 tablet (50 mg total) by mouth daily 12/24/18  Yes May Siegel MD   amitriptyline (ELAVIL) 25 mg tablet Take 1 tablet (25 mg total) by mouth daily at bedtime 2/12/18 3/12/18  Hansel Perez MD       Allergies: No Known Allergies    Social History:     Marital Status: /Civil Union   Substance Use History:   History   Alcohol Use No     History   Smoking Status    Never Smoker   Smokeless Tobacco    Never Used     History   Drug Use No       Family History:    Family History   Problem Relation Age of Onset    Alzheimer's disease Mother     Stroke Father         CVA    Diabetes Sister        Physical Exam:     Vitals:   Blood Pressure: 163/75 (01/08/19 0400)  Pulse: 55 (01/08/19 0400)  Temperature: 98 °F (36 7 °C) (01/08/19 0400)  Temp Source: Temporal (01/08/19 0400)  Respirations: 16 (01/08/19 0400)  Height: 5' 6" (167 6 cm) (01/08/19 0145)  Weight - Scale: 65 8 kg (145 lb 1 oz) (01/08/19 0145)  SpO2: 97 % (01/08/19 0400)    Physical Exam   Constitutional: He is oriented to person, place, and time  He appears well-developed and well-nourished  HENT:   Head: Normocephalic  Cardiovascular: Normal rate and regular rhythm  Murmur heard  Pulmonary/Chest: Effort normal and breath sounds normal    Musculoskeletal: Normal range of motion  Arms:  Neurological: He is alert and oriented to person, place, and time  He has normal strength  GCS eye subscore is 4  GCS verbal subscore is 5  GCS motor subscore is 6  Skin: Skin is warm and dry  Psychiatric: He has a normal mood and affect  Nursing note and vitals reviewed  Additional Data:     Lab Results: I have personally reviewed pertinent reports          Results from last 7 days  Lab Units 01/07/19  2309   WBC Thousand/uL 5 88   HEMOGLOBIN g/dL 12 1   HEMATOCRIT % 38 5   PLATELETS Thousands/uL 206   NEUTROS PCT % 66       Results from last 7 days  Lab Units 01/07/19  2309   SODIUM mmol/L 137   POTASSIUM mmol/L 4 4   CHLORIDE mmol/L 96*   CO2 mmol/L 29   BUN mg/dL 28*   CREATININE mg/dL 0 93   CALCIUM mg/dL 9 4   TOTAL BILIRUBIN mg/dL 0 40   ALK PHOS U/L 104   ALT U/L 22   AST U/L 25       Results from last 7 days  Lab Units 01/07/19  2309   INR  0 95       Results from last 7 days  Lab Units 01/07/19 2309   TROPONIN I ng/mL <0 02               Imaging: I have personally reviewed pertinent reports  CT chest wo contrast   Final Result by Chaz Castellanos MD (01/08 4029)      1  Minimally displaced fractures of the left posterior 3rd and 4th ribs  2   Nondisplaced fracture of the right posterolateral 7th rib  Workstation performed: [id number]         X-ray chest 1 view portable   ED Interpretation by Bernadette Polanco DO (01/08 0006)   nad          CT chest wo contrast   Final Result      1  Minimally displaced fractures of the left posterior 3rd and 4th ribs  2   Nondisplaced fracture of the right posterolateral 7th rib  Workstation performed: [id number]         X-ray chest 1 view portable   ED Interpretation   nad          EKG, Pathology, and Other Studies Reviewed on Admission:   · EKG: SB with no ischemic changes    Allscripts / Epic Records Reviewed: Yes     ** Please Note: This note has been constructed using a voice recognition system   **

## 2019-01-08 NOTE — ASSESSMENT & PLAN NOTE
Patient presents to the emergency department with complaints of left-sided chest pain  This began while he was at rest at home in bed  There was no radiation, no associated shortness of breath  Chest pain is reproducible with palpation  Patient has diffuse ecchymosis to left side of the body from a fall on December 29th which he did not seek medical attention for  Troponin was negative  EKG revealed SB with no ischemic changes  CT of the chest revealed a minimally displaced fracture of the left posterior 3rd and 4th ribs, nondisplaced fracture of the right 7th rib  When compared to previous imaging done here, right rib fracture is old, left rib fractures are new  Pain is likely secondary due to fall and fractures, not likely cardiac but is admitted to rule out ACS  · Admit to Medicine  · Serial EKGs and troponin  · Aspirin, statin  · Echocardiogram  · Lipid panel in the a m    · Consider cardiology consultation depending on clinical course

## 2019-01-08 NOTE — NURSING NOTE
Patient left the unit via wheelchair, not in respiratory distress on room air denies pain and discomfort at this time, accompanied by wife and YEMIA Yunior Borja, Patient awake alert, pleasant to staff, discharge instructions given to wife, patient verbalized understanding discharge instructions, IVF site discontinued, dry dressing applied, no bleeding noted on the site

## 2019-01-08 NOTE — OCCUPATIONAL THERAPY NOTE
OT EVALUATION     01/08/19 1115   Note Type   Note type Eval only   Restrictions/Precautions   Other Precautions Chair Alarm; Bed Alarm;Hard of hearing; Fall Risk   Pain Assessment   Pain Assessment No/denies pain   Home Living   Type of Home House   Home Layout Two level  (1/2 bath on first floor, pt washes up at sink)   Bathroom Shower/Tub None  (First floor shower with sink only)   Bathroom Toilet Standard  ([place]ode placed over toilet)   [address] Erik Walker;Cane  (4 walkers and 2 canes)   Additional Comments Pt has been sponge bathing at the sink in first floor bathroom  Pts wife emphasized that they did not think it was safe for him to be getting up the stairs  Prior Function   Level of [place] Independent with ADLs and functional mobility; Needs assistance with IADLs   Lives With Spouse   Receives Help From Family;Friend(s)   ADL Assistance Independent   IADLs Needs assistance   Comments Pt previously assisted with cleaning but has not been able to perform these tasks over the past several months due to medical condition  Pt is  for household and friends have been assisting pt's wife with transportation to food shopping over the past week  ADL   Eating Assistance 5  Supervision/Setup   Grooming Assistance 5  Supervision/Setup   UB Bathing Assistance 4  Minimal Assistance   LB Bathing Assistance 3  Moderate Assistance   UB Dressing Assistance 4  Minimal Assistance   LB Dressing Assistance 3  Moderate Assistance   Toileting Assistance  3  Moderate Assistance   Bed Mobility   Rolling R 5  Supervision   Additional items Verbal cues   Rolling L 5  Supervision   Additional items Assist x 1   Supine to Sit 3  Moderate assistance   Additional items Assist x 1;Verbal cues;LE management   Transfers   Sit to Stand 3  Moderate assistance   Additional items Assist x 1; Impulsive;Verbal cues  (Benefited from vcs to wait until walker was in front of him )   Stand to Sit 3  Moderate assistance   Additional items Assist x 1; Impulsive;Verbal cues  (Verbal cues to get close to chair and reach for armrests)   Stand pivot 3  Moderate assistance   Additional items Assist x 1; Impulsive;Verbal cues   Balance   Static Sitting Fair   Dynamic Sitting Poor +   Static Standing Poor +   Dynamic Standing Poor   Activity Tolerance   Activity Tolerance Patient limited by fatigue   RUE Overall AROM   R Mass Grasp Full flexion of all fingers, 5/5   RUE Strength   R Shoulder Flexion 4/5   R Shoulder Extension 4/5   R Elbow Flexion 5/5   R Elbow Extension 5/5   LUE Overall AROM   L Mass Grasp Full flexion of all fingers, 5/5   LUE Strength   L Shoulder Flexion 4/5   L Shoulder Extension 4/5   L Elbow Flexion 5/5   L Elbow Extension 5/5   Hand Function   Gross Motor Coordination Functional   Fine Motor Coordination Functional   Cognition   Overall Cognitive Status WFL   Following Commands Follows all commands and directions without difficulty   Comments Increased volume when giving directions as patient is hard of hearing   Assessment   Limitation Decreased ADL status; Decreased UE strength;Decreased Safe judgement during ADL;Decreased endurance;Decreased self-care trans;Decreased high-level ADLs   Prognosis Good   Assessment Patient evaluated by Occupational Therapy  Patient admitted with left sided chest pain  The patients occupational profile, medical and therapy history includes a extensive additional review of physical, cognitive, or psychosocial history related to current functional performance  Comorbidities affecting functional mobility and ADLS include: aortic stenosis, cataracts, and depression  Prior to admission, patient was independent with ADLS, requiring assist for IADLS and living in a multi-level home    The evaluation identifies the following performance deficits: weakness, impaired balance, decreased endurance, increased fall risk, new onset of impairment of functional mobility, decreased ADLS, decreased IADLS, decreased activity tolerance, decreased safety awareness, impaired judgement and decreased strength, that result in activity limitations and/or participation restrictions  This evaluation requires clinical decision making of high complexity, because the patient presents with comorbidites that affect occupational performance and required significant modification of tasks or assistance with consideration of multiple treatment options  The Barthel Index was used as a functional outcome tool presenting with a score of 30, indicating marked limitations of functional mobility and ADLS  Patient will benefit from skilled Occupational Therapy services to address above deficits and facilitate a safe return to prior level of function  Goals   Patient Goals To go home   STG Time Frame (1-7 days)   Short Term Goal  Goals established to promote patient goal of going home:  Patient will increase standing tolerance to 2 minutes during ADL task to decrease assistance level and decrease fall risk; Patient will increase bed mobility to min assist in preparation for ADLS and transfers; Patient will increase functional mobility to and from bathroom with rolling walker with min assist to increase performance with ADLS and to use a toilet; Patient will tolerate 5 minutes of UE ROM/strengthening to increase general activity tolerance and performance in ADLS/IADLS; Patient will improve functional activity tolerance to 10 minutes of sustained functional tasks to increase participation in basic self-care and decrease assistance level; Patient will increase static sitting balance to fair+ and dynamic sitting balance to fair- to improve the ability to sit at edge of bed or on a chair for ADLS;  Patient will increase static standing balance to fair- and dynamic standing balance to poor+ to improve postural stability and decrease fall risk during standing ADLS and transfers       LTG Time Frame (8-14 days)   Long Term Goal Goals established to promote patient goal of going home:  Patient will increase standing tolerance to 5 minutes during ADL task to decrease assistance level and decrease fall risk; Patient will increase bed mobility to supervision in preparation for ADLS and transfers; Patient will increase functional mobility to and from bathroom with rolling walker with supervision to increase performance with ADLS and to use a toilet; Patient will tolerate 7 minutes of UE ROM/strengthening to increase general activity tolerance and performance in ADLS/IADLS; Patient will improve functional activity tolerance to 15 minutes of sustained functional tasks to increase participation in basic self-care and decrease assistance level; Patient will increase static sitting balance to good and dynamic sitting balance to fair to improve the ability to sit at edge of bed or on a chair for ADLS;  Patient will increase static standing balance to fair and dynamic standing balance to fair- to improve postural stability and decrease fall risk during standing ADLS and transfers  Functional Transfer Goals   Pt Will Transfer To Bedside Commode (STG min assist, LTG supervision)   ADL Goals   Pt Will Perform Eating (STG independent)   Pt Will Perform Grooming (STG independent)   Pt Will Perform Bathing (STG min assist, LTG supervision)   Pt Will Perform UE Dressing (STG supervision, LTG independent)   Pt Will Perform LE Dressing (STG min assist, LTG supervision)   Pt Will Perform Toileting (STG min assist, LTG supervision)   Plan   Treatment Interventions ADL retraining;Functional transfer training;UE strengthening/ROM; Endurance training;Patient/family training; Activityengagement;Equipment evaluation/education   Goal Expiration Date 01/22/19   OT Frequency 3-5x/wk   Recommendation   OT Discharge Recommendation (Inpatient rehab)   Barthel Index   Feeding 5   Bathing 0   Grooming Score 0   Dressing Score 5   Bladder Score 5   Bowels Score 5   Toilet Use Score 5   Transfers (Bed/Chair) Score 5   Mobility (Level Surface) Score 0   Stairs Score 0   Barthel Index Score 30   Licensure   [place] License Number  Christina Dia Henry 85, OTR/L [id number]

## 2019-01-09 ENCOUNTER — TELEPHONE (OUTPATIENT)
Dept: FAMILY MEDICINE CLINIC | Facility: CLINIC | Age: 74
End: 2019-01-09

## 2019-01-09 LAB
ATRIAL RATE: 57 BPM
MRSA NOSE QL CULT: NORMAL
P AXIS: 8 DEGREES
PR INTERVAL: 208 MS
QRS AXIS: 14 DEGREES
QRSD INTERVAL: 86 MS
QT INTERVAL: 430 MS
QTC INTERVAL: 418 MS
T WAVE AXIS: 39 DEGREES
VENTRICULAR RATE: 57 BPM

## 2019-01-09 PROCEDURE — 93010 ELECTROCARDIOGRAM REPORT: CPT | Performed by: INTERNAL MEDICINE

## 2019-01-09 PROCEDURE — 93306 TTE W/DOPPLER COMPLETE: CPT | Performed by: INTERNAL MEDICINE

## 2019-01-09 NOTE — ASSESSMENT & PLAN NOTE
On Florinef for orthostasis and hyponatremia  Blood pressure is significantly elevated upon presentation which is likely secondary to pain  Blood Pressure improved later

## 2019-01-09 NOTE — ASSESSMENT & PLAN NOTE
Patient presents to the emergency department with complaints of left-sided chest pain  This began while he was at rest at home in bed  There was no radiation, no associated shortness of breath  Chest pain is reproducible with palpation  Patient has diffuse ecchymosis to left side of the body from a fall on December 29th which he did not seek medical attention for  Troponin was negative  EKG revealed SB with no ischemic changes  CT of the chest revealed a minimally displaced fracture of the left posterior 3rd and 4th ribs, nondisplaced fracture of the right 7th rib  When compared to previous imaging done here, right rib fracture is old, left rib fractures are new  Pain is likely secondary due to fall and fractures, not likely cardiac but is admitted to rule out ACS  · Serial troponins were negative  · Likely secondary to rib fractures  · Continue Celebrex and p r n  Percocet  · I did have a prolonged discussion with patient's wife and recommended short-term rehabilitation  Patient's wife wants to take the patient back home with home services

## 2019-01-09 NOTE — DISCHARGE SUMMARY
Discharge- Patrice Jackman 1945, 74 y o  male MRN: [id number]    Unit/Bed#: ICU 05 Encounter: [id number]    Primary Care Provider: Mary Cisneros MD   Date and time admitted to hospital: 1/7/2019 11:02 PM        * Other chest pain   Assessment & Plan    Patient presents to the emergency department with complaints of left-sided chest pain  This began while he was at rest at home in bed  There was no radiation, no associated shortness of breath  Chest pain is reproducible with palpation  Patient has diffuse ecchymosis to left side of the body from a fall on December 29th which he did not seek medical attention for  Troponin was negative  EKG revealed SB with no ischemic changes  CT of the chest revealed a minimally displaced fracture of the left posterior 3rd and 4th ribs, nondisplaced fracture of the right 7th rib  When compared to previous imaging done here, right rib fracture is old, left rib fractures are new  Pain is likely secondary due to fall and fractures, not likely cardiac but is admitted to rule out ACS  · Serial troponins were negative  · Likely secondary to rib fractures  · Continue Celebrex and p r n  Percocet  · I did have a prolonged discussion with patient's wife and recommended short-term rehabilitation  Patient's wife wants to take the patient back home with home services  Frequent falls   Assessment & Plan    Patient had 3 falls in the month of December, 2 of which he visited the ER for  PT OT evaluated the patient and short-term rehabilitation was offered to the patient and wife  Wife wants to take the patient home  Patient was discharged home with home PT     Benign hypertension   Assessment & Plan    On [facility] for orthostasis and hyponatremia  Blood pressure is significantly elevated upon presentation which is likely secondary to pain  Blood Pressure improved later       Depression   Assessment & Plan    Continue Zoloft     Orthostasis   Assessment & Plan    Continue Florinef           Discharging Physician / Practitioner: Ari Russell MD  PCP: Samy Stokes MD  Admission Date: 1/7/2019  Discharge Date: 01/08/19    Reason for Admission: Chest Pain (Patient complains on left sided chest pain  Patient fell two weeks ago and had significant bruising on left side of chest and ribs  Patient does not take any blood thinners and has no other complaints )        Resolved Problems  Date Reviewed: 1/8/2019    None          Consultations During Hospital Stay:  None    Significant Findings / Test Results:     ·     Results from last 7 days  Lab Units 01/08/19  0604 01/07/19  2309   WBC Thousand/uL 5 91 5 88   HEMOGLOBIN g/dL 11 8* 12 1   PLATELETS Thousands/uL 217 206       Results from last 7 days  Lab Units 01/08/19  0604 01/07/19  2309   SODIUM mmol/L 136 137   POTASSIUM mmol/L 3 8 4 4   CHLORIDE mmol/L 100 96*   CO2 mmol/L 26 29   BUN mg/dL 20 28*   CREATININE mg/dL 0 76 0 93   CALCIUM mg/dL 9 2 9 4   TOTAL BILIRUBIN mg/dL  --  0 40   ALK PHOS U/L  --  104   ALT U/L  --  22   AST U/L  --  25       Results from last 7 days  Lab Units 01/07/19  2309   INR  0 95       Results from last 7 days  Lab Units 01/08/19  0845 01/08/19  0604 01/07/19  2309   TROPONIN I ng/mL <0 02 0 02 <0 02     Lab Results   Component Value Date/Time    HGBA1C 5 2 07/23/2018 01:50 PM             Blood Culture: No results found for: BLOODCX  Urine Culture: No results found for: URINECX  Sputum Culture: No components found for: SPUTUMCX  Wound Culture: No results found for: WOUNDCULT     X-ray chest 1 view portable   ED Interpretation by Anatoliy Jim DO (01/08 0006)   nad      Final Result by Carmen Vargas MD (01/08 5185)      No acute cardiopulmonary disease  Workstation performed: [id number]         CT chest wo contrast   Final Result by Sri Braxton MD (01/08 7212)      1  Minimally displaced fractures of the left posterior 3rd and 4th ribs     2   Nondisplaced fracture of the right posterolateral 7th rib  Workstation performed: [id number]              Outpatient Tests Requested:  · Follow-up with PCP     Complications:  None    Reason for Admission:   Chief Complaint   Patient presents with    Chest Pain     Patient complains on left sided chest pain  Patient fell two weeks ago and had significant bruising on left side of chest and ribs  Patient does not take any blood thinners and has no other complaints  Hospital Course:     Mindy Lobato is a 74 y o  male patient with a PMH of I aortic stenosis, cataracts, orthostatic hypertension, depression who originally presented to the hospital on 1/7/2019 due to left-sided chest discomfort  The patient was initially admitted to the hospital for rule out ACS  Later patient was noted to have significant ecchymosis on the entire left chest wall and patient had CT scan of the chest which showed minimally displaced fracture of the left posterior 3rd and 4th ribs and nondisplaced fracture on the right 7th rib  Patient had serial cardiac enzymes which were negative  Later patient was evaluated by physical therapy and occupational therapy  Patient and wife were given option of discharge to short-term rehabilitation  Wife opting to take the patient home with home services  Patient will be discharged home with home services on Percocet for severe pain to follow up with outpatient Cardiology and PCP  Please see above list of diagnoses and related plan for additional information       Condition at Discharge: stable       Discharge Day Visit / Exam:     Subjective:  Patient denies any further left-sided chest pain, shortness of breath, headache, dizziness or palpitation  Vitals: Blood Pressure: 168/72 (01/08/19 1400)  Pulse: 64 (01/08/19 1400)  Temperature: (!) 97 °F (36 1 °C) (01/08/19 1112)  Temp Source: Temporal (01/08/19 1112)  Respirations: (!) 35 (01/08/19 1400)  Height: 5' 6" (167 6 cm) (01/08/19 0145)  Weight - Scale: 65 8 kg (145 lb 1 oz) (01/08/19 0145)  SpO2: 98 % (01/08/19 1200)  Exam:   Physical Exam   Constitutional: No distress  HENT:   Head: Normocephalic and atraumatic  Eyes: Pupils are equal, round, and reactive to light  Conjunctivae are normal    Neck: Normal range of motion  Neck supple  Cardiovascular: Normal rate and regular rhythm  Murmur heard  Pulmonary/Chest: Effort normal  No respiratory distress  He has no wheezes  He has no rhonchi  He has no rales  He exhibits no tenderness  Significant bruising of the left side of chest wall   Abdominal: Soft  Bowel sounds are normal  He exhibits no distension  There is no tenderness  There is no rebound and no guarding  Musculoskeletal: He exhibits no edema  Neurological: He is alert  No cranial nerve deficit  Skin: Skin is warm and dry  No rash noted  Discharge instructions/Information to patient and family:   See after visit summary for information provided to patient and family  Provisions for Follow-Up Care:  See after visit summary for information related to follow-up care and any pertinent home health orders  Disposition:     Home    Planned Readmission: No     Discharge Statement:  I spent 25 minutes discharging the patient  This time was spent on the day of discharge  I had direct contact with the patient on the day of discharge  Greater than 50% of the total time was spent examining patient, answering all patient questions, arranging and discussing plan of care with patient as well as directly providing post-discharge instructions  Additional time then spent on discharge activities  Discharge Medications:  See after visit summary for reconciled discharge medications provided to patient and family        ** Please Note: This note has been constructed using a voice recognition system **

## 2019-01-09 NOTE — ASSESSMENT & PLAN NOTE
Patient had 3 falls in the month of December, 2 of which he visited the ER for  PT OT evaluated the patient and short-term rehabilitation was offered to the patient and wife  Wife wants to take the patient home  Patient was discharged home with home PT

## 2019-01-10 ENCOUNTER — APPOINTMENT (OUTPATIENT)
Dept: PHYSICAL THERAPY | Facility: CLINIC | Age: 74
End: 2019-01-10
Payer: COMMERCIAL

## 2019-01-11 ENCOUNTER — OFFICE VISIT (OUTPATIENT)
Dept: FAMILY MEDICINE CLINIC | Facility: CLINIC | Age: 74
End: 2019-01-11
Payer: COMMERCIAL

## 2019-01-11 VITALS
RESPIRATION RATE: 16 BRPM | WEIGHT: 152 LBS | SYSTOLIC BLOOD PRESSURE: 134 MMHG | HEART RATE: 60 BPM | BODY MASS INDEX: 24.43 KG/M2 | TEMPERATURE: 97.7 F | OXYGEN SATURATION: 98 % | DIASTOLIC BLOOD PRESSURE: 70 MMHG | HEIGHT: 66 IN

## 2019-01-11 DIAGNOSIS — S22.43XD MULTIPLE CLOSED FRACTURES OF RIBS OF BOTH SIDES WITH ROUTINE HEALING, SUBSEQUENT ENCOUNTER: ICD-10-CM

## 2019-01-11 DIAGNOSIS — M19.90 ARTHRITIS: ICD-10-CM

## 2019-01-11 DIAGNOSIS — G89.29 CHRONIC NONINTRACTABLE HEADACHE, UNSPECIFIED HEADACHE TYPE: ICD-10-CM

## 2019-01-11 DIAGNOSIS — R29.6 FREQUENT FALLS: Primary | ICD-10-CM

## 2019-01-11 DIAGNOSIS — Z85.46 HISTORY OF PROSTATE CANCER: ICD-10-CM

## 2019-01-11 DIAGNOSIS — R51.9 CHRONIC NONINTRACTABLE HEADACHE, UNSPECIFIED HEADACHE TYPE: ICD-10-CM

## 2019-01-11 PROCEDURE — 1111F DSCHRG MED/CURRENT MED MERGE: CPT | Performed by: FAMILY MEDICINE

## 2019-01-11 PROCEDURE — 99214 OFFICE O/P EST MOD 30 MIN: CPT | Performed by: FAMILY MEDICINE

## 2019-01-11 RX ORDER — CELECOXIB 200 MG/1
200 CAPSULE ORAL DAILY
Qty: 90 CAPSULE | Refills: 1 | Status: SHIPPED | OUTPATIENT
Start: 2019-01-11 | End: 2019-04-17 | Stop reason: HOSPADM

## 2019-01-11 RX ORDER — AMOXICILLIN AND CLAVULANATE POTASSIUM 500; 125 MG/1; MG/1
1 TABLET, FILM COATED ORAL 2 TIMES DAILY
Refills: 0 | COMMUNITY
Start: 2019-01-10 | End: 2019-01-11 | Stop reason: ALTCHOICE

## 2019-01-15 ENCOUNTER — APPOINTMENT (OUTPATIENT)
Dept: PHYSICAL THERAPY | Facility: CLINIC | Age: 74
End: 2019-01-15
Payer: COMMERCIAL

## 2019-01-15 ENCOUNTER — TELEPHONE (OUTPATIENT)
Dept: FAMILY MEDICINE CLINIC | Facility: CLINIC | Age: 74
End: 2019-01-15

## 2019-01-15 NOTE — TELEPHONE ENCOUNTER
Dr [facility]: [facility] asking for information on patient's fractured ribs I e , office notes, etc)   Can you please call her to discuss further

## 2019-01-17 ENCOUNTER — APPOINTMENT (OUTPATIENT)
Dept: PHYSICAL THERAPY | Facility: CLINIC | Age: 74
End: 2019-01-17
Payer: COMMERCIAL

## 2019-01-18 ENCOUNTER — TELEPHONE (OUTPATIENT)
Dept: NEUROLOGY | Facility: CLINIC | Age: 74
End: 2019-01-18

## 2019-01-18 ENCOUNTER — TELEPHONE (OUTPATIENT)
Dept: FAMILY MEDICINE CLINIC | Facility: CLINIC | Age: 74
End: 2019-01-18

## 2019-01-18 DIAGNOSIS — I95.1 ORTHOSTASIS: ICD-10-CM

## 2019-01-18 DIAGNOSIS — E87.1 HYPONATREMIA: ICD-10-CM

## 2019-01-18 DIAGNOSIS — R29.6 FREQUENT FALLS: ICD-10-CM

## 2019-01-18 RX ORDER — FLUDROCORTISONE ACETATE 0.1 MG/1
0.1 TABLET ORAL DAILY
Qty: 30 TABLET | Refills: 5 | Status: SHIPPED | OUTPATIENT
Start: 2019-01-18 | End: 2019-02-01 | Stop reason: ALTCHOICE

## 2019-01-18 NOTE — TELEPHONE ENCOUNTER
I had last seen him for headache only in May  I'm not aware of extremity weakness  He had no motor weakness last May  Make a f/u visit

## 2019-01-18 NOTE — TELEPHONE ENCOUNTER
Visiting nurse advised pt to take percocet which worked for his headache  He only has 3 left and is requesting refill

## 2019-01-18 NOTE — TELEPHONE ENCOUNTER
Randa Sharif from [facility] called to update us about patient- He is having PT done and he has atrophy of both calves- has had many falls due to this and has fractured ribs  She is unsure how much they can do for him and when she tries to explain to him and the Wife they just do no understand       Looking in chart- I noticed Dr Barbara Villegas office is who referred him to PT

## 2019-01-18 NOTE — TELEPHONE ENCOUNTER
Maryanne,     Patients wife called stating that he has a head ache and she just wanted to call and let you know because the physical therapist is supposed to be coming and she wants to know if that is a good idea? Please call her back when you can to discuss   TY

## 2019-01-18 NOTE — TELEPHONE ENCOUNTER
Spoke to wife about neurology appointment  Lm for kei  visiting nurse [phone], to let her know Dr Foley  does not want to presrogeriobe shannan any more percocet because of his falls    She wants him to see neurology which he has an appointment for 2/4/2019

## 2019-01-18 NOTE — TELEPHONE ENCOUNTER
Nikki Bhardwaj from [facility] returned your call  Please call her back at [phone]

## 2019-01-22 ENCOUNTER — APPOINTMENT (OUTPATIENT)
Dept: PHYSICAL THERAPY | Facility: CLINIC | Age: 74
End: 2019-01-22
Payer: COMMERCIAL

## 2019-01-24 ENCOUNTER — APPOINTMENT (OUTPATIENT)
Dept: PHYSICAL THERAPY | Facility: CLINIC | Age: 74
End: 2019-01-24
Payer: COMMERCIAL

## 2019-01-27 NOTE — PROGRESS NOTES
Assessment/Plan:   Diagnoses and all orders for this visit:    Frequent falls  Comments:  upcomig neuro re-eval, ref to PT/[facility]  Refuses STR    Multiple closed fractures of ribs of both sides with routine healing, subsequent encounter  Comments:  monitor healing process, control pain -celebrex and otc tylenol    Arthritis  Comments:  refill Celebrex given  Orders:  -     celecoxib (CeleBREX) 200 mg capsule; Take 1 capsule (200 mg total) by mouth daily As needed for headache, joint or muscle pain    Chronic nonintractable headache, unspecified headache type  Comments:  Await upcoming neuro re-eval     History of prostate cancer  Comments:  watch for possible bony involvement if [id number] w urol-Dr Liang Baker  Other orders  -     Discontinue: amoxicillin-clavulanate (AUGMENTIN) 500-125 mg per tablet; Take 1 tablet by mouth 2 (two) times a day            Subjective:      Patient ID: Kim Hernandez is a 74 y o  male  Chief Complaint   Patient presents with    Follow-up     slw 1/7/2019/ for chest pain (,cracked ribs)       74year-old patient in with wife Kya Woodson for [address] [place] [address] ED follow-up  Patient seen on the 7th of this month after experiencing chest pain  Cardiac enzymes/troponins were negative  X-rays revealed nondisplaced rib fracture on the right and minimally displaced rib fractures on the left  Patient did have several falls the month of December going into January-did not seek medical attention for initial   Did decline short-term rehab-wanted to be at home under wife's and visiting nurse care  He continues with denies fatigue and arthritic pain as well as intermittent headaches  He has seen neuro in the past and is scheduled for follow-up to further evaluate  Pt also due for urology [id number]  Antario--hx prostate ca          The following portions of the patient's history were reviewed and updated as appropriate: allergies, current medications, past family history, past medical history, past social history, past surgical history and problem list      Review of Systems   Constitutional: Positive for fatigue  Negative for fever  Respiratory: Negative  Cardiovascular: Negative  Gastrointestinal: Negative  Musculoskeletal: Positive for arthralgias, gait problem and myalgias  Skin: Negative for rash  Neurological: Positive for weakness and headaches  Psychiatric/Behavioral: Positive for decreased concentration and sleep disturbance  The patient is nervous/anxious  Objective:    /70 (BP Location: Right arm, Patient Position: Sitting, Cuff Size: Standard)   Pulse 60   Temp 97 7 °F (36 5 °C)   Resp 16   Ht 5' 6" (1 676 m)   Wt 68 9 kg (152 lb)   SpO2 98% Comment: RA  BMI 24 53 kg/m²        Physical Exam   Constitutional:   Chronically ill, pale   Eyes: Conjunctivae are normal  No scleral icterus  Neck: Neck supple  Cardiovascular: Normal rate, regular rhythm and intact distal pulses  Pulmonary/Chest: Effort normal and breath sounds normal  No respiratory distress  Abdominal: Soft  Bowel sounds are normal  There is tenderness  Musculoskeletal: He exhibits tenderness  Neurological: He is alert  No cranial nerve deficit  Oriented to self and place, unsure of date   Skin: Skin is warm and dry  Psychiatric:   Anxious, denies a/v hallucinations   Nursing note and vitals reviewed  Labs;  Labs in chart were reviewed        Harjinder Andujar MD

## 2019-01-29 ENCOUNTER — APPOINTMENT (OUTPATIENT)
Dept: PHYSICAL THERAPY | Facility: CLINIC | Age: 74
End: 2019-01-29
Payer: COMMERCIAL

## 2019-01-31 ENCOUNTER — APPOINTMENT (OUTPATIENT)
Dept: PHYSICAL THERAPY | Facility: CLINIC | Age: 74
End: 2019-01-31
Payer: COMMERCIAL

## 2019-02-01 ENCOUNTER — OFFICE VISIT (OUTPATIENT)
Dept: CARDIOLOGY CLINIC | Facility: CLINIC | Age: 74
End: 2019-02-01
Payer: COMMERCIAL

## 2019-02-01 VITALS
WEIGHT: 152 LBS | HEIGHT: 66 IN | BODY MASS INDEX: 24.43 KG/M2 | HEART RATE: 66 BPM | DIASTOLIC BLOOD PRESSURE: 68 MMHG | OXYGEN SATURATION: 98 % | SYSTOLIC BLOOD PRESSURE: 128 MMHG

## 2019-02-01 DIAGNOSIS — I35.0 AORTIC STENOSIS, MILD: ICD-10-CM

## 2019-02-01 DIAGNOSIS — I10 BENIGN HYPERTENSION: Primary | ICD-10-CM

## 2019-02-01 PROCEDURE — 99213 OFFICE O/P EST LOW 20 MIN: CPT | Performed by: INTERNAL MEDICINE

## 2019-02-01 NOTE — PROGRESS NOTES
Cardiology Follow Up    Unique Araya  1945  [id number]     Interval History: Mr  Lindsay Ross is here for follow up of aortic stenosis  He has multiple admissions for falls  BP was previously low and he was started on Florinef  Other falls happened afterwards and he had hypertension during ER assessment  He denies any syncope or near syncope  Falls occurred because of leg weakness/imbalance  Scheduled to see neurologist next week  Recent echocardiogram showed a normal EF with mild aortic stenosis  He denies any chest pain or shortness of breath  He denies any LE edema, orthopnea or PND  Echocardiogram 1/2019: EF 55-60% with grade 1 dysfunction  Trace MR, Mild TR  Mild AS  Nuclear stress test 11/2016: No ischemia or infarction  Past Medical History:   Diagnosis Date    Abnormal blood chemistry 09/21/2009    Aortic stenosis, moderate     Arthritis 05/09/2011    localized primary osteoarthritis of lower leg    Backache     Benign essential HTN     last assessed 12/28/17    Cataract     Depression with anxiety     Diarrhea     DVT [facility]deep venous thrombosis) ([facility]) 09/09/2011    Right Leg    Generalized anxiety disorder 08/10/2009    Glaucoma     last assesssed 2/11/13    Hearing problem     History of herniated intervertebral disc 04/28/2004    Memory loss     Prostate cancer [facility] [address] [facility]     PVD (peripheral vascular disease) ([facility] [address] ) 04/28/2004    Skin cancer of nose     Skin cancer of nose      Social History     Social History    Marital status: /Civil Union     Spouse name: N/A    Number of children: N/A    Years of education: N/A     Occupational History    Not on file       Social History Main Topics    Smoking status: Never Smoker    Smokeless tobacco: Never Used    Alcohol use No    Drug use: No    Sexual activity: No     Other Topics Concern    Not on file     Social History Narrative    Exercise: walking and push ups 2x a week    Sleeps 8-10 hrs a day      Family History   Problem Relation Age of Onset    Alzheimer's disease Mother     Stroke Father         CVA    Diabetes Sister      Past Surgical History:   Procedure Laterality Date    BUNIONECTOMY      simple bunion exostectomy (silver procedure)    CATARACT EXTRACTION      resolved 2012    CYSTOSCOPY W/ URETERAL STENT PLACEMENT      EYE SURGERY Bilateral     laser, left-2012 , right-2015    FRACTURE SURGERY      spinal, resolved 8/15/10    HERNIA REPAIR      inguinal sliding    OTHER SURGICAL HISTORY      needle cath placement for brachyther applic into genitalia    PROSTATE BIOPSY      needle bx    RENAL ARTERY STENT      REPLACEMENT TOTAL KNEE BILATERAL      right-2012 , left-2016       Current Outpatient Prescriptions:     bimatoprost (LUMIGAN) 0 01 % ophthalmic drops, Administer 1 drop to both eyes daily at bedtime  , Disp: , Rfl:     brimonidine-timolol (COMBIGAN) 0 2-0 5 %, Administer 1 drop to both eyes 2 (two) times a day, Disp: , Rfl:     celecoxib (CeleBREX) 200 mg capsule, Take 1 capsule (200 mg total) by mouth daily As needed for headache, joint or muscle pain, Disp: 90 capsule, Rfl: 1    cyanocobalamin 2000 MCG tablet, Take 2,000 mcg by mouth daily, Disp: , Rfl:     sertraline (ZOLOFT) 50 mg tablet, Take 1 tablet (50 mg total) by mouth daily, Disp: 90 tablet, Rfl: 1  No Known Allergies    Labs:  Lab Results   Component Value Date     06/12/2017    K 3 8 01/08/2019    K 4 3 10/22/2018     01/08/2019    CL 98 10/22/2018    CO2 26 01/08/2019    CO2 26 10/22/2018    BUN 20 01/08/2019    BUN 23 10/22/2018    CREATININE 0 76 01/08/2019    CREATININE 0 96 06/12/2017    GLUCOSE 98 06/12/2017    CALCIUM 9 2 01/08/2019    CALCIUM 9 5 06/12/2017     Lab Results   Component Value Date    CHOL 142 06/12/2017    TRIG 60 01/08/2019    TRIG 149 06/12/2017    HDL 47 01/08/2019    HDL 34 (L) 06/12/2017     Imaging: No results found    EKG done today was reviewed: Normal sinus rhythm with no ST abnormalities    Review of Systems:  Review of Systems   Constitutional: Negative for chills, fatigue and fever  HENT: Negative for congestion, nosebleeds and postnasal drip  Respiratory: Negative for cough, chest tightness and shortness of breath  Cardiovascular: Negative for chest pain, palpitations and leg swelling  Gastrointestinal: Negative for abdominal distention, abdominal pain, diarrhea, nausea and vomiting  Endocrine: Negative for polydipsia, polyphagia and polyuria  Musculoskeletal: Positive for arthralgias and gait problem  Negative for myalgias  Skin: Negative for color change, pallor and rash  Allergic/Immunologic: Negative for environmental allergies, food allergies and immunocompromised state  Neurological: Positive for weakness  Negative for dizziness, seizures, syncope and light-headedness  Hematological: Negative for adenopathy  Does not bruise/bleed easily  Psychiatric/Behavioral: Negative for dysphoric mood  The patient is not nervous/anxious  Physical Exam:  /68 (BP Location: Right arm, Patient Position: Sitting, Cuff Size: Standard)   Pulse 66 Comment: apical  Ht 5' 6" (1 676 m)   Wt 68 9 kg (152 lb)   SpO2 98%   BMI 24 53 kg/m²   Physical Exam   Constitutional: He is oriented to person, place, and time  He appears well-developed  No distress  HENT:   Head: Normocephalic and atraumatic  Eyes: Pupils are equal, round, and reactive to light  Conjunctivae and EOM are normal    Neck: Neck supple  No JVD present  No thyromegaly present  Cardiovascular: Normal rate and regular rhythm  Exam reveals no gallop and no friction rub  Murmur heard  Pulmonary/Chest: Effort normal and breath sounds normal    Abdominal: Soft  He exhibits no distension  There is no tenderness  Musculoskeletal: He exhibits no edema  Neurological: He is alert and oriented to person, place, and time  No cranial nerve deficit  Skin: Skin is warm and dry  No rash noted   He is not diaphoretic  No erythema  Psychiatric: He has a normal mood and affect  His behavior is normal  Judgment and thought content normal        1  Benign hypertension     2  Aortic stenosis, mild     3  Recurrent Falls     Discussion/Summary:  1  Chest pain - resolved  No recurrent episodes since 2016   2  Aortic stenosis - review of echocardiogram shows mild disease only  3  History of orthostatic hypotension - did not contribute to falling  May stop florinef for now and repeat orthostatic VS next visit  Continue PT and neurology follow ups         Estela Morales DO  Please call with any questions or suggestions

## 2019-02-02 PROBLEM — I95.1 ORTHOSTASIS: Status: RESOLVED | Noted: 2018-08-16 | Resolved: 2019-02-02

## 2019-02-04 ENCOUNTER — OFFICE VISIT (OUTPATIENT)
Dept: NEUROLOGY | Facility: CLINIC | Age: 74
End: 2019-02-04
Payer: COMMERCIAL

## 2019-02-04 VITALS
DIASTOLIC BLOOD PRESSURE: 80 MMHG | WEIGHT: 152 LBS | SYSTOLIC BLOOD PRESSURE: 110 MMHG | HEART RATE: 66 BPM | BODY MASS INDEX: 24.43 KG/M2 | HEIGHT: 66 IN | RESPIRATION RATE: 18 BRPM

## 2019-02-04 DIAGNOSIS — M51.9 LUMBAR DISC DISEASE: ICD-10-CM

## 2019-02-04 DIAGNOSIS — G43.011 INTRACTABLE MIGRAINE WITHOUT AURA AND WITH STATUS MIGRAINOSUS: ICD-10-CM

## 2019-02-04 DIAGNOSIS — R51.9 CHRONIC DAILY HEADACHE: Primary | ICD-10-CM

## 2019-02-04 DIAGNOSIS — G44.51 HEADACHE, HEMICRANIA CONTINUA: ICD-10-CM

## 2019-02-04 DIAGNOSIS — G43.009 ATYPICAL MIGRAINE: ICD-10-CM

## 2019-02-04 PROCEDURE — 99214 OFFICE O/P EST MOD 30 MIN: CPT | Performed by: PSYCHIATRY & NEUROLOGY

## 2019-02-06 ENCOUNTER — TELEPHONE (OUTPATIENT)
Dept: NEUROLOGY | Facility: CLINIC | Age: 74
End: 2019-02-06

## 2019-02-06 NOTE — TELEPHONE ENCOUNTER
JAGUAR CALLED ON BEHALF OF CARLIN AND IS ASKING IF IT IS FINE FOR HIM TO TAKE CELEBREX  ADVISED YES PER YOUR INSTRUCTIONS

## 2019-02-11 ENCOUNTER — TELEPHONE (OUTPATIENT)
Dept: NEUROLOGY | Facility: CLINIC | Age: 74
End: 2019-02-11

## 2019-02-11 NOTE — TELEPHONE ENCOUNTER
Patient wife states his Celebrex is not working for headaches only for his body aches, he also had a nightmare that she wanted you to be aware of  He also woke up with a migraine today

## 2019-02-11 NOTE — TELEPHONE ENCOUNTER
He's only started on emgality  He hasn't tolerated any other preventive drugs  He will need to wait sometime after his first emgality injectiosn

## 2019-02-14 ENCOUNTER — TELEPHONE (OUTPATIENT)
Dept: CARDIOLOGY CLINIC | Facility: CLINIC | Age: 74
End: 2019-02-14

## 2019-02-14 NOTE — TELEPHONE ENCOUNTER
I don't get the visiting nurse BP readings    Tell her to call if he doesn't feel well or if BP is low or high

## 2019-02-14 NOTE — TELEPHONE ENCOUNTER
WIFE CALLED STATES  SAW YOU AND YOU CHANGED MEDICATIONS BP IS CHECKED ONCE A WEEK BY VISITING NURSE HAS APPT WITH YOU ON FEB 22 SHE WANTS TO KNOW IF IT IS REALLY NECESSARY TO COME FINANCES ARE TIGHT CAN HE SEE YOU LATER ON IF SO WHEN

## 2019-02-22 ENCOUNTER — HOSPITAL ENCOUNTER (EMERGENCY)
Facility: HOSPITAL | Age: 74
Discharge: HOME | End: 2019-02-22
Attending: EMERGENCY MEDICINE
Payer: COMMERCIAL

## 2019-02-22 ENCOUNTER — APPOINTMENT (EMERGENCY)
Dept: RADIOLOGY | Facility: HOSPITAL | Age: 74
End: 2019-02-22
Payer: COMMERCIAL

## 2019-02-22 ENCOUNTER — OFFICE VISIT (OUTPATIENT)
Dept: FAMILY MEDICINE CLINIC | Facility: CLINIC | Age: 74
End: 2019-02-22
Payer: COMMERCIAL

## 2019-02-22 VITALS
HEART RATE: 66 BPM | BODY MASS INDEX: 23.89 KG/M2 | WEIGHT: 148 LBS | SYSTOLIC BLOOD PRESSURE: 213 MMHG | RESPIRATION RATE: 18 BRPM | TEMPERATURE: 96.7 F | DIASTOLIC BLOOD PRESSURE: 93 MMHG | OXYGEN SATURATION: 100 %

## 2019-02-22 VITALS
DIASTOLIC BLOOD PRESSURE: 60 MMHG | BODY MASS INDEX: 23.78 KG/M2 | RESPIRATION RATE: 16 BRPM | WEIGHT: 148 LBS | HEIGHT: 66 IN | HEART RATE: 80 BPM | SYSTOLIC BLOOD PRESSURE: 100 MMHG | TEMPERATURE: 97.3 F

## 2019-02-22 DIAGNOSIS — S51.019A SKIN TEAR OF ELBOW WITHOUT COMPLICATION: ICD-10-CM

## 2019-02-22 DIAGNOSIS — S09.90XA INJURY OF HEAD, INITIAL ENCOUNTER: ICD-10-CM

## 2019-02-22 DIAGNOSIS — S00.83XA CONTUSION OF MASTOID, INITIAL ENCOUNTER: ICD-10-CM

## 2019-02-22 DIAGNOSIS — R29.6 RECURRENT FALLS: Primary | ICD-10-CM

## 2019-02-22 DIAGNOSIS — S51.002A OPEN WOUND OF LEFT ELBOW, INITIAL ENCOUNTER: ICD-10-CM
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
## 2019-02-22 DIAGNOSIS — S81.801A OPEN WOUND OF RIGHT LOWER LEG, INITIAL ENCOUNTER: ICD-10-CM

## 2019-02-22 PROCEDURE — 70450 CT HEAD/BRAIN W/O DYE: CPT

## 2019-02-22 PROCEDURE — 99284 EMERGENCY DEPT VISIT MOD MDM: CPT

## 2019-02-22 PROCEDURE — 73080 X-RAY EXAM OF ELBOW: CPT

## 2019-02-22 PROCEDURE — 99214 OFFICE O/P EST MOD 30 MIN: CPT | Performed by: FAMILY MEDICINE

## 2019-02-22 PROCEDURE — 72125 CT NECK SPINE W/O DYE: CPT

## 2019-02-22 RX ORDER — ACETAMINOPHEN, ASPIRIN AND CAFFEINE 250; 250; 65 MG/1; MG/1; MG/1
1 TABLET, FILM COATED ORAL EVERY 6 HOURS PRN
COMMUNITY
End: 2019-04-17 | Stop reason: HOSPADM

## 2019-02-22 RX ORDER — MIRABEGRON 25 MG/1
25 TABLET, FILM COATED, EXTENDED RELEASE ORAL AS NEEDED
Refills: 1 | COMMUNITY
Start: 2019-02-18 | End: 2019-04-17 | Stop reason: HOSPADM

## 2019-02-22 NOTE — DISCHARGE INSTRUCTIONS
Fall Prevention   WHAT YOU NEED TO KNOW:   Fall prevention includes ways to make your home and other areas safer  It also includes ways you can move more carefully to prevent a fall  Health conditions that cause changes in your blood pressure, vision, or muscle strength and coordination may increase your risk for falls  Medicines may also increase your risk for falls if they make you dizzy, weak, or sleepy  DISCHARGE INSTRUCTIONS:   Call 911 or have someone else call if:   · You have fallen and are unconscious  · You have fallen and cannot move part of your body  Contact your healthcare provider if:   · You have fallen and have pain or a headache  · You have questions or concerns about your condition or care  Fall prevention tips:   · Stand or sit up slowly  This may help you keep your balance and prevent falls  · Use assistive devices as directed  Your healthcare provider may suggest that you use a cane or walker to help you keep your balance  You may need to have grab bars put in your bathroom near the toilet or in the shower  · Wear shoes that fit well and have soles that   Wear shoes both inside and outside  Use slippers with good   Do not wear shoes with high heels  · Wear a personal alarm  This is a device that allows you to call 911 if you fall and need help  Ask your healthcare provider for more information  · Stay active  Exercise can help strengthen your muscles and improve your balance  Your healthcare provider may recommend water aerobics or walking  He or she may also recommend physical therapy to improve your coordination  Never start an exercise program without talking to your healthcare provider first      · Manage your medical conditions  Keep all appointments with your healthcare providers  Visit your eye doctor as directed  Home safety tips:   · Add items to prevent falls in the bathroom    Put nonslip strips on your bath or shower floor to prevent you from slipping  Use a bath mat if you do not have carpet in the bathroom  This will prevent you from falling when you step out of the bath or shower  Use a shower seat so you do not need to stand while you shower  Sit on the toilet or a chair in your bathroom to dry yourself and put on clothing  This will prevent you from losing your balance from drying or dressing yourself while you are standing  · Keep paths clear  Remove books, shoes, and other objects from walkways and stairs  Place cords for telephones and lamps out of the way so that you do not need to walk over them  Tape them down if you cannot move them  Remove small rugs  If you cannot remove a rug, secure it with double-sided tape  This will prevent you from tripping  · Install bright lights in your home  Use night lights to help light paths to the bathroom or kitchen  Always turn on the light before you start walking  · Keep items you use often on shelves within reach  Do not use a step stool to help you reach an item  · Paint or place reflective tape on the edges of your stairs  This will help you see the stairs better  Follow up with your healthcare provider as directed:  Write down your questions so you remember to ask them during your visits  © 2017 [employer] Information is for End User's use only and may not be sold, redistributed or otherwise used for commercial purposes  All illustrations and images included in CareNotes® are the copyrighted property of A D A M [employer] Inc  or Felipe Treviño  The above information is an  only  It is not intended as medical advice for individual conditions or treatments  Talk to your doctor, nurse or pharmacist before following any medical regimen to see if it is safe and effective for you  Head Injury   WHAT YOU NEED TO KNOW:   A head injury is most often caused by a blow to the head   This may occur from a fall, bicycle injury, sports injury, being struck in the head, or a motor vehicle accident  DISCHARGE INSTRUCTIONS:   Call 911 or have someone else call for any of the following:   · You cannot be woken  · You have a seizure  · You stop responding to others or you faint  · You have blurry or double vision  · Your speech becomes slurred or confused  · You have arm or leg weakness, loss of feeling, or new problems with coordination  · Your pupils are larger than usual or one pupil is a different size than the other  · You have blood or clear fluid coming out of your ears or nose  Seek care immediately if:   · You have repeated or forceful vomiting  · You feel confused  · Your headache gets worse or becomes severe  · You or someone caring for you notices that you are harder to wake than usual   Contact your healthcare provider if:   · Your symptoms last longer than 6 weeks after the injury  · You have questions or concerns about your condition or care  Medicines:   · Acetaminophen  decreases pain  Acetaminophen is available without a doctor's order  Ask how much to take and how often to take it  Follow directions  Acetaminophen can cause liver damage if not taken correctly  · Take your medicine as directed  Contact your healthcare provider if you think your medicine is not helping or if you have side effects  Tell him or her if you are allergic to any medicine  Keep a list of the medicines, vitamins, and herbs you take  Include the amounts, and when and why you take them  Bring the list or the pill bottles to follow-up visits  Carry your medicine list with you in case of an emergency  Self-care:   · Rest  or do quiet activities for 24 to 48 hours  Limit your time watching TV, using the computer, or doing tasks that require a lot of thinking  Slowly return to your normal activities as directed  Do not play sports or do activities that may cause you to get hit in the head   Ask your healthcare provider when you can return to sports  · Apply ice  on your head for 15 to 20 minutes every hour or as directed  Use an ice pack, or put crushed ice in a plastic bag  Cover it with a towel before you apply it to your skin  Ice helps prevent tissue damage and decreases swelling and pain  · Have someone stay with you for 24 hours  or as directed  This person can monitor you for complications and call [phone]  When you are awake the person should ask you a few questions to see if you are thinking clearly  An example would be to ask your name or your address  Prevent another head injury:   · Wear a helmet that fits properly  Do this when you play sports, or ride a bike, scooter, or skateboard  Helmets help decrease your risk of a serious head injury  Talk to your healthcare provider about other ways you can protect yourself if you play sports  · Wear your seat belt every time you are in a car  This helps to decrease your risk for a head injury if you are in a car accident  Follow up with your healthcare provider as directed:  Write down your questions so you remember to ask them during your visits  © 2017 [address] [facility] Information is for End User's use only and may not be sold, redistributed or otherwise used for commercial purposes  All illustrations and images included in CareNotes® are the copyrighted property of [employer]  or Felipe Treviño  The above information is an  only  It is not intended as medical advice for individual conditions or treatments  Talk to your doctor, nurse or pharmacist before following any medical regimen to see if it is safe and effective for you

## 2019-02-22 NOTE — ED NOTES
Skin tear to L elbow and R shin is dressed with adaptCHARMAINE dowd and Kerlix        Yemi Lay RN  02/22/19 8968

## 2019-02-23 NOTE — ED PROVIDER NOTES
History  Chief Complaint   Patient presents with    Fall     fell last night in the bathroom  bruise behind left  ear  skin tear left elbow and bruise right lower leg     Patient presents with spouse for evaluation after a fall  Seen by PMD and sent in for evaluation of the head trauma  Patient fell in the bathroom last night  Denies LOC  Patient has a history of frequent falls and has been evaluated in the past for it  Wife states since last admission has VNA services but has not been in physical therapy  History provided by:  Patient and spouse  History limited by:  Dementia   used: No    Fall   Associated symptoms: no chest pain        Prior to Admission Medications   Prescriptions Last Dose Informant Patient Reported? Taking? Galcanezumab-gnlm (EMGALITY) 120 MG/ML SOAJ   No No   Sig: Administer 2 injections 2-3 hours apart the first time and then 1 injection every 30 days     Patient not taking: Reported on 2/22/2019   MYRBETRIQ 25 MG TB24   Yes No   aspirin-acetaminophen-caffeine (EXCEDRIN MIGRAINE) 250-250-65 MG per tablet   Yes No   Sig: Take 1 tablet by mouth every 6 (six) hours as needed   brimonidine-timolol (COMBIGAN) 0 2-0 5 %  Self Yes No   Sig: Administer 1 drop to both eyes 2 (two) times a day   celecoxib (CeleBREX) 200 mg capsule  Self No No   Sig: Take 1 capsule (200 mg total) by mouth daily As needed for headache, joint or muscle pain   cyanocobalamin 2000 MCG tablet  Self Yes No   Sig: Take 2,000 mcg by mouth daily   sertraline (ZOLOFT) 50 mg tablet  Self No No   Sig: Take 1 tablet (50 mg total) by mouth daily      Facility-Administered Medications: None       Past Medical History:   Diagnosis Date    Abnormal blood chemistry 09/21/2009    Aortic stenosis, moderate     Arthritis 05/09/2011    localized primary osteoarthritis of lower leg    Backache     Benign essential HTN     last assessed 12/28/17    Cataract     Depression with anxiety     Diarrhea     DVT (deep venous thrombosis) ([facility] ) 09/09/2011    Right Leg    Generalized anxiety disorder 08/10/2009    Glaucoma     last assesssed 2/11/13    Hearing problem     History of herniated intervertebral disc 04/28/2004    Memory loss     Prostate cancer ([facility] )     PVD (peripheral vascular disease) ([facility] ) 04/28/2004    Skin cancer of nose     Skin cancer of nose        Past Surgical History:   Procedure Laterality Date    BUNIONECTOMY      simple bunion exostectomy (silver procedure)    CATARACT EXTRACTION      resolved 2012    CYSTOSCOPY W/ URETERAL STENT PLACEMENT      EYE SURGERY Bilateral     laser, left-2012 , right-2015    FRACTURE SURGERY      spinal, resolved 8/15/10    HERNIA REPAIR      inguinal sliding    OTHER SURGICAL HISTORY      needle cath placement for brachyther applic into genitalia    PROSTATE BIOPSY      needle bx    RENAL ARTERY STENT      REPLACEMENT TOTAL KNEE BILATERAL      right-2012 , left-2016       Family History   Problem Relation Age of Onset    Alzheimer's disease Mother     Stroke Father         CVA    Diabetes Sister      I have reviewed and agree with the history as documented  Social History     Tobacco Use    Smoking status: Never Smoker    Smokeless tobacco: Never Used   Substance Use Topics    Alcohol use: No    Drug use: No        Review of Systems   Constitutional: Negative for fever  Respiratory: Negative for shortness of breath  Cardiovascular: Negative for chest pain  All other systems reviewed and are negative  Physical Exam  Physical Exam   Constitutional: No distress  HENT:   Head:       Mouth/Throat: Oropharynx is clear and moist    Eyes: Pupils are equal, round, and reactive to light  EOM are normal    Neck: Normal range of motion  Cardiovascular: Normal rate, regular rhythm and intact distal pulses  Pulmonary/Chest: Effort normal and breath sounds normal  No respiratory distress  Abdominal: Soft  There is no tenderness  Musculoskeletal: Normal range of motion  Neurological: He is alert  No cranial nerve deficit  He exhibits normal muscle tone  Skin: Capillary refill takes less than 2 seconds  He is not diaphoretic  Nursing note and vitals reviewed  Vital Signs  ED Triage Vitals   Temperature Pulse Respirations Blood Pressure SpO2   02/22/19 1700 02/22/19 1700 02/22/19 1700 02/22/19 1700 02/22/19 1700   97 7 °F (36 5 °C) 63 18 (!) 184/83 99 %      Temp Source Heart Rate Source Patient Position - Orthostatic VS BP Location FiO2 (%)   02/22/19 1840 02/22/19 1840 02/22/19 1840 02/22/19 1840 --   Tympanic Monitor Lying Right arm       Pain Score       02/22/19 1700       2           Vitals:    02/22/19 1700 02/22/19 1840 02/22/19 1845   BP: (!) 184/83  (!) 213/93   Pulse: 63 66 66   Patient Position - Orthostatic VS:  Lying        Visual Acuity      ED Medications  Medications - No data to display    Diagnostic Studies  Results Reviewed     None                 XR elbow 3+ views LEFT   Final Result by Rodrigue Crowell DO (02/23 0750)      No acute osseous abnormality  Limitations above  Followup imaging may be obtained for any persistent or worsening symptoms  Workstation performed: [id number]         CT head without contrast   Final Result by Nehemias Telles MD (02/22 1735)      No acute intracranial abnormality  Stable left maxillary sinus impaction with hyperdense material raising the possibility of fungal disease  Stable ethmoid sinusitis  Workstation performed: [id number]         CT cervical spine without contrast   Final Result by Nehemias Telles MD (02/22 1745)      No cervical spine fracture or traumatic malalignment                     Workstation performed: [id number]                    Procedures  Procedures       Phone Contacts  ED Phone Contact    ED Course                               MDM  Number of Diagnoses or Management Options  Fall, initial encounter:   Injury of head, initial encounter:   Skin tear of elbow without complication:   Diagnosis management comments: Pulse ox 100% on RA indicating adequate oxygenation  Xray L elbow: no fx or dislocation as read by me    CT results discussed with patient and family  Patient has been seen for multiple falls in the past, follows with outpatient neurology  Wife would like to take the patient home  Advised following up with PMD for BOP check and referral to physical therapy  Amount and/or Complexity of Data Reviewed  Tests in the radiology section of CPT®: ordered and reviewed  Decide to obtain previous medical records or to obtain history from someone other than the patient: yes  Review and summarize past medical records: yes  Independent visualization of images, tracings, or specimens: yes    Patient Progress  Patient progress: stable      Disposition  Final diagnoses:   Fall, initial encounter   Injury of head, initial encounter   Skin tear of elbow without complication     Time reflects when diagnosis was documented in both MDM as applicable and the Disposition within this note     Time User Action Codes Description Comment    2/22/2019  6:29 PM Lola White Add [H93  LWKM] Fall, initial encounter     2/22/2019  6:29 PM Rachel Boyle Add [S09 90XA] Injury of head, initial encounter     2/22/2019  6:29 PM Lola White Add [E49 681O] Skin tear of elbow without complication       ED Disposition     ED Disposition Condition Date/Time Comment    Discharge Stable Fri Feb 22, 2019  6:29 PM Maria Del Carmen Muro discharge to home/self care              Follow-up Information     Follow up With Specialties Details Why Contact Info Additional Information    Matthias Goldmann, MD Family Medicine In 3 days  [address] [facility] [address] [address] [facility] Emergency Department Emergency Medicine  If symptoms worsen [address] [facility] ED, [place], [place], [address]          Discharge Medication List as of 2/22/2019  6:29 PM      CONTINUE these medications which have NOT CHANGED    Details   aspirin-acetaminophen-caffeine (EXCEDRIN MIGRAINE) 250-250-65 MG per tablet Take 1 tablet by mouth every 6 (six) hours as needed, Historical Med      brimonidine-timolol (COMBIGAN) 0 2-0 5 % Administer 1 drop to both eyes 2 (two) times a day, Historical Med      celecoxib (CeleBREX) 200 mg capsule Take 1 capsule (200 mg total) by mouth daily As needed for headache, joint or muscle pain, Starting Fri 1/11/2019, Normal      cyanocobalamin 2000 MCG tablet Take 2,000 mcg by mouth daily, Historical Med      Galcanezumab-gnlm (EMGALITY) 120 MG/ML SOAJ Administer 2 injections 2-3 hours apart the first time and then 1 injection every 30 days  , Normal      MYRBETRIQ 25 MG TB24 Starting Mon 2/18/2019, Historical Med      sertraline (ZOLOFT) 50 mg tablet Take 1 tablet (50 mg total) by mouth daily, Starting Mon 12/24/2018, Normal           No discharge procedures on file      ED Provider  Electronically Signed by           Jimmie Nobles DO  02/23/19 9512

## 2019-02-24 ENCOUNTER — TELEPHONE (OUTPATIENT)
Dept: OTHER | Facility: OTHER | Age: 74
End: 2019-02-24

## 2019-02-24 NOTE — TELEPHONE ENCOUNTER
Alyse Stevens 1945  CONFIDENTIALTY NOTICE: This fax transmission is intended only for the addressee  It contains information that is legally privileged,  confidential or otherwise protected from use or disclosure  If you are not the intended recipient, you are strictly prohibited from reviewing,  disclosing, copying using or disseminating any of this information or taking any action in reliance on or regarding this information  If you have  received this fax in error, please notify us immediately by telephone so that we can arrange for its return to us  Page: 1 of 2  Call Id: [id number]  Health Call  Standard Call Report  Health Call  Patient Name: Alyse Stevens  Gender: Male  : 1945  Age: 74 Y 9 M 15 D  Return Phone  Number: [phone] (Home)  Address: [address] [place] [address]/[facility]/Zip: [address] [place] [address]  Practice Name:  [facility] [address]  Practice Charged:  Physician:  [facility] Name: Moe Pedro  Relationship To  Patient: Spouse  Return Phone Number: [phone] (Home)  Presenting Problem: "My  is having moments of  anger and I'm not sure what to do "  Service Type: Triage  Charged Service 1: Muriel Duarte U  38  Name and  Number:  Nurse Assessment  Nurse: Christian Beth RN, Ethan Lemos Date/Time: 2019 2:23:02 PM  Type of assessment required:  ---General (Adult or Child)  Duration of Current S/S  ---Today in the morning  Location/Radiation  ---N/A  Temperature (F) and route:  ---No fever  Symptom Specific Meds (Dose/Time):  ---None  Other S/S  ---Patient was said to have thrown his walker in anger  Patients spouse denies any  injury  States that she thinks Grzegorz Mccoy may be developing dementia  Patient is said to be  calm now  Pain Scale on scale of 1-10, 10 being the worst:  ---No pain  Symptom progression:  ---same  Intake and Output  Alyse Stevens 1945  CONFIDENTIALTY NOTICE: This fax transmission is intended only for the addressee   It contains information that is legally privileged,  confidential or otherwise protected from use or disclosure  If you are not the intended recipient, you are strictly prohibited from reviewing,  disclosing, copying using or disseminating any of this information or taking any action in reliance on or regarding this information  If you have  received this fax in error, please notify us immediately by telephone so that we can arrange for its return to us  Page: 2 of 2  Call Id: [id number]  Nurse Assessment  ---WNL  Last Exam/Treatment:  ---In the ED on 2/22/2019 for fall  Protocols  Protocol Title Nurse Date/Time  No Guideline Available - Sick Adult Call Herbie Ocasio 2/24/2019 2:25:39 PM  Question Caller Affirmed  Disp  Time Disposition Final User  2/24/2019 1:07:01 PM Send to Follow Up Johnie Anna RN, Onur Holguin  2/24/2019 2:25:56 PM Call  Now Zoila Osborne RN, Onur Holguin  2/24/2019 2:27:43 PM RN Triaged Yes Zoila Osborne RN, [facility] Advice Given Per Protocol  CALL  NOW: Immediate medical attention is needed  You need to hang up and call 911 (or an ambulance)  (Ines Calloway: I'll call you back in a few minutes to be sure you were able to reach them ) CARE ADVICE per nursing judgment or reference  (No Guideline Available - [address] Adult Call; Adult)  Caller Understands: Yes  Caller Disagree/Comply: Disagree  PreDisposition: Unsure  Comments  User: Ben Dunaway RN Date/Time: 2/24/2019 1:06:47 PM  Spoke with patients wife Charly Maldonado who states  is very upset and thru his walker  When starting to gather assessment  information caller disconnected phone call  Will try patient back in 15 minutes  User: Ben Dunaway RN Date/Time: 2/24/2019 2:27:29 PM  Recommended for Charly Maldonado to call 911 if patient is displaying aggressive behavior towards her  Charly Maldonado declined stating that the  patient is fine right now and calm  States that the ambulance has been over there home too many times already   Charly Maldonado stated  that she prefers to follow up with Mukesh Jeter provider  Mukesh Jeter has an appointment scheduled for Wednesday

## 2019-02-25 NOTE — PROGRESS NOTES
Assessment/Plan:   Diagnoses and all orders for this visit:    Recurrent falls  Comments:  Advised further evaluation at [facility]  -triage notified  Contusion of mastoid, initial encounter  Comments:  left-s/p fall-concern for skull fracture (middle cranial fossa)/Richard's sign--needs imaging    Open wound of left elbow, initial encounter  Comments:  pain/swelling w skin tear-bleeding controlled, some limited ROM-needs imaging     Open wound of right lower leg, initial encounter    Other orders  -     MYRBETRIQ 25 MG TB24  -     aspirin-acetaminophen-caffeine (EXCEDRIN MIGRAINE) 250-250-65 MG per tablet; Take 1 tablet by mouth every 6 (six) hours as needed            Subjective:      Patient ID: Mukesh Hutchins is a 74 y o  male  Chief Complaint   Patient presents with   Sophia Olmstead     at home times 2, wound on right leg, bruising on head and behind left ear       73 yo pt in w wife Jose Ramon Pierre and friend, Melody--hx recurrent falls-most recent last night-  Does not recall sxs before fall--denies LOC -EMS called as wife could not lift pt back up-  Pt not taken to ER for eval   +Head injury as well as injuries to left elbow and right leg   +Hx headaches, no n/v  The following portions of the patient's history were reviewed and updated as appropriate: allergies, current medications, past family history, past medical history, past social history, past surgical history and problem list      Review of Systems   Constitutional: Positive for fatigue and fever  HENT: Positive for sinus pressure  Respiratory: Negative  Cardiovascular: Negative  Musculoskeletal: Positive for arthralgias, gait problem and myalgias  Neurological: Positive for headaches  Psychiatric/Behavioral: Positive for confusion, decreased concentration and sleep disturbance           Objective:    /60 (BP Location: Left arm, Patient Position: Sitting, Cuff Size: Standard)   Pulse 80   Temp (!) 97 3 °F (36 3 °C) (Temporal)   Resp 16 Ht 5' 6" (1 676 m)   Wt 67 1 kg (148 lb)   BMI 23 89 kg/m²        Physical Exam   Constitutional: He appears well-developed and well-nourished  drowsy   HENT:   Mouth/Throat: No oropharyngeal exudate  Eyes: Conjunctivae are normal  No scleral icterus  Neck: Neck supple  Cardiovascular: Normal rate and regular rhythm  Pulmonary/Chest: Effort normal and breath sounds normal  No respiratory distress  Abdominal: Soft  Bowel sounds are normal    Musculoskeletal: He exhibits tenderness  Neurological: No cranial nerve deficit  Drowsy, oriented to self and place, not t date   Skin: There is pallor  Skin tear and contusion left elbow  abrasion and open lesion right lower leg    Contusion behind left ear and on left posterior scalp   Nursing note and vitals reviewed          Karime Horn MD

## 2019-02-25 NOTE — PROGRESS NOTES
Patient has not been able to return to PT  Patient is discharged at this time and has been recommended to return to PT as able to address balance and falls

## 2019-02-25 NOTE — TELEPHONE ENCOUNTER
Ilsa Yost and tutu had a good day today   Neither one of them were feeling good this weekend and couldn't seem to get along very well  I spoke to tutu about Dr Dayana Campos she is willing to go if you let her know in advance   Ilsa Yost seems to be feeling better   We spoke briefly about Newton Horan and shannan being so jean pierre with all of these falls   Tc/cma

## 2019-02-26 NOTE — TELEPHONE ENCOUNTER
Spoke w ENT office--they will have Dr Du Rosa look at [employer] but soonest appt would be 4/2/19 at 1:45pm-they will also put Nishant Salazar on the wait list--please inform Melissa-najma

## 2019-02-27 ENCOUNTER — OFFICE VISIT (OUTPATIENT)
Dept: FAMILY MEDICINE CLINIC | Facility: CLINIC | Age: 74
End: 2019-02-27
Payer: COMMERCIAL

## 2019-02-27 VITALS
SYSTOLIC BLOOD PRESSURE: 122 MMHG | BODY MASS INDEX: 22.82 KG/M2 | WEIGHT: 142 LBS | HEIGHT: 66 IN | RESPIRATION RATE: 16 BRPM | TEMPERATURE: 97.6 F | DIASTOLIC BLOOD PRESSURE: 62 MMHG | HEART RATE: 74 BPM

## 2019-02-27 DIAGNOSIS — R29.6 FREQUENT FALLS: Primary | ICD-10-CM

## 2019-02-27 DIAGNOSIS — S50.02XS CONTUSION OF LEFT ELBOW, SEQUELA: ICD-10-CM

## 2019-02-27 DIAGNOSIS — G89.29 CHRONIC NONINTRACTABLE HEADACHE, UNSPECIFIED HEADACHE TYPE: ICD-10-CM

## 2019-02-27 DIAGNOSIS — R51.9 CHRONIC NONINTRACTABLE HEADACHE, UNSPECIFIED HEADACHE TYPE: ICD-10-CM

## 2019-02-27 DIAGNOSIS — I10 BENIGN HYPERTENSION: ICD-10-CM

## 2019-02-27 PROCEDURE — 99214 OFFICE O/P EST MOD 30 MIN: CPT | Performed by: FAMILY MEDICINE

## 2019-02-28 NOTE — TELEPHONE ENCOUNTER
Is there a charge and/or copay for pt and wife to see Shana Eddy for counseling or is it just billed to insurance?

## 2019-03-04 ENCOUNTER — TELEPHONE (OUTPATIENT)
Dept: FAMILY MEDICINE CLINIC | Facility: CLINIC | Age: 74
End: 2019-03-04

## 2019-03-04 PROBLEM — R26.9 GAIT ABNORMALITY: Status: ACTIVE | Noted: 2019-03-04

## 2019-03-04 NOTE — TELEPHONE ENCOUNTER
Dr Mary Santamaria    Patient's wife is thinking about getting wheelchair for patient  [facility] and [facility]  She wants to know what you think about this  Please advise

## 2019-03-05 NOTE — TELEPHONE ENCOUNTER
Script is at  in case pt wants to  , or it needs to be faxed if she calls back with a fax number   lmtrc tc/cma

## 2019-03-06 DIAGNOSIS — F32.A ANXIETY AND DEPRESSION: Primary | ICD-10-CM

## 2019-03-06 DIAGNOSIS — F41.9 ANXIETY AND DEPRESSION: Primary | ICD-10-CM

## 2019-03-06 RX ORDER — CLONAZEPAM 0.25 MG/1
0.25 TABLET, ORALLY DISINTEGRATING ORAL 2 TIMES DAILY PRN
Qty: 30 TABLET | Refills: 1 | Status: SHIPPED | OUTPATIENT
Start: 2019-03-06 | End: 2019-03-27 | Stop reason: ALTCHOICE

## 2019-03-06 NOTE — TELEPHONE ENCOUNTER
Spoke with Gabino Reeder he said he was having a bad day , him and tutu not getting a long well  When I first called he was screamimg in the back round yelling her name , swearing   she was just ignoring him   When I asked to speak with him he was very nice  No medication delivary yet    They told tutu it was going to be later in the day/cma

## 2019-03-06 NOTE — PROGRESS NOTES
Spoke w wife, Melissa-pt w increased agitation-pt on Sertraline-will add low dose Klonopin (Clonazepam) 0 25mg BID prn and continue to monitor  Also looking into counseling for pt w Gibran Standing here at office  Will continue to monitor

## 2019-03-08 ENCOUNTER — TELEPHONE (OUTPATIENT)
Dept: FAMILY MEDICINE CLINIC | Facility: CLINIC | Age: 74
End: 2019-03-08

## 2019-03-08 NOTE — TELEPHONE ENCOUNTER
Dr Mahad Dickerson,     Visiting nurses called and wanted to inform you that the patient fell again  They said he has no visible injuries and will be sending over the report when it is finished  They just wanted to keep you informed

## 2019-03-09 NOTE — TELEPHONE ENCOUNTER
Spoke with wife yesterday Claudetta Harry was throwing his cane around , she locked herself in the bathroom twice while he banged on the door   He also fell 2x yesterday   Today he was better when he woke but did not remember a thing from yesterday  Wife would like home physical therapy again since he is falling , he doesn't do his exercises unless someone comes in   She said that was working good for him   She sees no improvement yet with new med  tc/cma

## 2019-03-11 NOTE — TELEPHONE ENCOUNTER
Visiting nurse is still coming 2 days a week ,she is going to check on getting a wheel chair for pt tc/cma

## 2019-03-18 ENCOUNTER — OFFICE VISIT (OUTPATIENT)
Dept: FAMILY MEDICINE CLINIC | Facility: CLINIC | Age: 74
End: 2019-03-18
Payer: COMMERCIAL

## 2019-03-18 VITALS
TEMPERATURE: 96.7 F | SYSTOLIC BLOOD PRESSURE: 100 MMHG | WEIGHT: 142.4 LBS | HEIGHT: 66 IN | RESPIRATION RATE: 16 BRPM | HEART RATE: 58 BPM | DIASTOLIC BLOOD PRESSURE: 60 MMHG | BODY MASS INDEX: 22.88 KG/M2

## 2019-03-18 DIAGNOSIS — R29.898 LEG WEAKNESS, BILATERAL: ICD-10-CM

## 2019-03-18 DIAGNOSIS — R26.89 BALANCE PROBLEM: ICD-10-CM

## 2019-03-18 DIAGNOSIS — J01.01 ACUTE RECURRENT MAXILLARY SINUSITIS: Primary | ICD-10-CM

## 2019-03-18 DIAGNOSIS — Z00.00 MEDICARE ANNUAL WELLNESS VISIT, SUBSEQUENT: ICD-10-CM

## 2019-03-18 PROCEDURE — 99213 OFFICE O/P EST LOW 20 MIN: CPT | Performed by: FAMILY MEDICINE

## 2019-03-18 PROCEDURE — G0439 PPPS, SUBSEQ VISIT: HCPCS | Performed by: FAMILY MEDICINE

## 2019-03-18 RX ORDER — AMOXICILLIN 875 MG/1
875 TABLET, COATED ORAL 2 TIMES DAILY
Qty: 20 TABLET | Refills: 0 | Status: SHIPPED | OUTPATIENT
Start: 2019-03-18 | End: 2019-03-28

## 2019-03-22 ENCOUNTER — OFFICE VISIT (OUTPATIENT)
Dept: BEHAVIORAL/MENTAL HEALTH CLINIC | Facility: CLINIC | Age: 74
End: 2019-03-22
Payer: COMMERCIAL

## 2019-03-22 DIAGNOSIS — F43.23 ADJUSTMENT DISORDER WITH MIXED ANXIETY AND DEPRESSED MOOD: Primary | ICD-10-CM

## 2019-03-22 DIAGNOSIS — F43.10 POSTTRAUMATIC STRESS DISORDER: ICD-10-CM

## 2019-03-22 PROCEDURE — 90834 PSYTX W PT 45 MINUTES: CPT | Performed by: SOCIAL WORKER

## 2019-03-26 NOTE — PROGRESS NOTES
Assessment/Plan:  Diagnoses and all orders for this visit:    Frequent falls  Comments:  will try to arrange for restart of PT thru [facility] for strengthening sec deconditioning  also red re-eval w neurologist for addtl rec  Benign hypertension  Comments:  BP improved-was inc in ER  cont t monitor  Chronic nonintractable headache, unspecified headache type  Comments:  CT head-no acute intracranial abnl +chronic sinusitis-??fungal element--will arange for ENT follow-up for further rec  Contusion of left elbow, sequela  Comments:  no fracture on x-ray  cont local wound care-skin tear  Subjective:      Patient ID: Pansy Peabody is a 74 y o  male  Chief Complaint   Patient presents with    Follow-up     slw er 2/22 for fall bruising on head behind left ear       73 yo pt in w wife-Melissa-for follow-up from ED visit of 02/22/2019  Patient had been sent from office to ER on that day after having sustained a fall night prior in his bathroom at home  In office patient was noted to have right shin injury as well as contusion behind his left ear and a left elbow skin tear and swelling  Circumstances surrounding the fall were not clear-seems to have been a mechanical fall patient did hit his head -denies loss of consciousness  CT of the head in the emergency room showed no acute intracranial abnormality  There findings of chronic sinusitis-questionable fungal element  CT of the cervical spine showed no fracture or malalignment  X-ray of the left elbow showed no fracture or dislocation  Patient has had no recurrence of falls since then  Overall he has been feeling better at  Injuries on his elbow in shin are healing and bruise behind ear is fading  He denies any new headaches outside those he has been experiencing  He denies nausea, vomiting, or any increase in dizziness or focal weakness  BP in office today is improved from when patient was seen in the ER on 02/22/2019    There are no new complaints today  The following portions of the patient's history were reviewed and updated as appropriate: allergies, current medications, past family history, past medical history, past social history, past surgical history and problem list      Review of Systems   Constitutional: Positive for fatigue  Negative for fever  HENT: Positive for postnasal drip  Respiratory: Negative  Cardiovascular: Negative  Gastrointestinal: Negative  Musculoskeletal: Positive for arthralgias, gait problem and myalgias  Skin: Positive for wound  Negative for rash  Neurological: Positive for headaches  Psychiatric/Behavioral: Positive for decreased concentration, dysphoric mood and sleep disturbance  The patient is nervous/anxious  Objective:    /62   Pulse 74   Temp 97 6 °F (36 4 °C)   Resp 16   Ht 5' 6" (1 676 m)   Wt 64 4 kg (142 lb)   BMI 22 92 kg/m²        Physical Exam   Constitutional:   Chronically ill, NAD   HENT:   Bruise behind left ear  No ear or nasal d/c   Eyes: Conjunctivae are normal  No scleral icterus  Neck: Neck supple  Cardiovascular: Normal rate and regular rhythm  Murmur heard  Pulmonary/Chest: Effort normal and breath sounds normal  No respiratory distress  Abdominal: Soft  Bowel sounds are normal  There is no tenderness  Musculoskeletal: He exhibits tenderness (left elbow-ROM intact)  Neurological: He is alert  No cranial nerve deficit  Skin: Skin is warm and dry  Skin tear left elbow  Contusion behind left ear and  Lateral right lower shin   Nursing note and vitals reviewed  Labs;  Labs in chart were reviewed        Zach Santa MD

## 2019-03-27 ENCOUNTER — TELEPHONE (OUTPATIENT)
Dept: FAMILY MEDICINE CLINIC | Facility: CLINIC | Age: 74
End: 2019-03-27

## 2019-03-27 DIAGNOSIS — F32.A ANXIETY AND DEPRESSION: Primary | ICD-10-CM

## 2019-03-27 DIAGNOSIS — F41.9 ANXIETY AND DEPRESSION: Primary | ICD-10-CM

## 2019-03-27 RX ORDER — CLONAZEPAM 0.5 MG/1
TABLET ORAL
Qty: 60 TABLET | Refills: 0 | Status: ON HOLD | OUTPATIENT
Start: 2019-03-27 | End: 2019-04-17 | Stop reason: SDUPTHER

## 2019-04-01 ENCOUNTER — OFFICE VISIT (OUTPATIENT)
Dept: NEPHROLOGY | Facility: CLINIC | Age: 74
End: 2019-04-01
Payer: COMMERCIAL

## 2019-04-01 VITALS
HEIGHT: 66 IN | WEIGHT: 143 LBS | BODY MASS INDEX: 22.98 KG/M2 | SYSTOLIC BLOOD PRESSURE: 130 MMHG | DIASTOLIC BLOOD PRESSURE: 62 MMHG

## 2019-04-01 DIAGNOSIS — E87.1 HYPONATREMIA: Primary | ICD-10-CM

## 2019-04-01 DIAGNOSIS — I10 BENIGN HYPERTENSION: ICD-10-CM

## 2019-04-01 PROCEDURE — 99213 OFFICE O/P EST LOW 20 MIN: CPT | Performed by: INTERNAL MEDICINE

## 2019-04-02 ENCOUNTER — OFFICE VISIT (OUTPATIENT)
Dept: OTOLARYNGOLOGY | Facility: CLINIC | Age: 74
End: 2019-04-02
Payer: COMMERCIAL

## 2019-04-02 VITALS
HEIGHT: 66 IN | BODY MASS INDEX: 22.66 KG/M2 | DIASTOLIC BLOOD PRESSURE: 68 MMHG | WEIGHT: 141 LBS | SYSTOLIC BLOOD PRESSURE: 110 MMHG

## 2019-04-02 DIAGNOSIS — G89.29 CHRONIC INTRACTABLE HEADACHE, UNSPECIFIED HEADACHE TYPE: ICD-10-CM

## 2019-04-02 DIAGNOSIS — R51.9 CHRONIC INTRACTABLE HEADACHE, UNSPECIFIED HEADACHE TYPE: ICD-10-CM

## 2019-04-02 DIAGNOSIS — J32.0 LEFT MAXILLARY SINUSITIS: Primary | ICD-10-CM

## 2019-04-02 LAB
BUN SERPL-MCNC: 24 MG/DL (ref 8–27)
BUN/CREAT SERPL: 31 (ref 10–24)
CALCIUM SERPL-MCNC: 9.8 MG/DL (ref 8.6–10.2)
CHLORIDE SERPL-SCNC: 100 MMOL/L (ref 96–106)
CO2 SERPL-SCNC: 26 MMOL/L (ref 20–29)
CREAT SERPL-MCNC: 0.78 MG/DL (ref 0.76–1.27)
GLUCOSE SERPL-MCNC: 80 MG/DL (ref 65–99)
LABCORP COMMENT: NORMAL
POTASSIUM SERPL-SCNC: 4.2 MMOL/L (ref 3.5–5.2)
SL AMB EGFR AFRICAN AMERICAN: 102 ML/MIN/1.73
SL AMB EGFR NON AFRICAN AMERICAN: 88 ML/MIN/1.73
SODIUM SERPL-SCNC: 139 MMOL/L (ref 134–144)

## 2019-04-02 PROCEDURE — 99203 OFFICE O/P NEW LOW 30 MIN: CPT | Performed by: SPECIALIST

## 2019-04-03 ENCOUNTER — TELEPHONE (OUTPATIENT)
Dept: NEPHROLOGY | Facility: CLINIC | Age: 74
End: 2019-04-03

## 2019-04-03 ENCOUNTER — TELEPHONE (OUTPATIENT)
Dept: FAMILY MEDICINE CLINIC | Facility: CLINIC | Age: 74
End: 2019-04-03

## 2019-04-08 ENCOUNTER — TELEPHONE (OUTPATIENT)
Dept: FAMILY MEDICINE CLINIC | Facility: CLINIC | Age: 74
End: 2019-04-08

## 2019-04-09 ENCOUNTER — TELEPHONE (OUTPATIENT)
Dept: NEUROLOGY | Facility: CLINIC | Age: 74
End: 2019-04-09

## 2019-04-15 ENCOUNTER — HOSPITAL ENCOUNTER (OUTPATIENT)
Facility: HOSPITAL | Age: 74
Setting detail: OBSERVATION
Discharge: NURSING, REHAB OR LONG-TERM CARE | End: 2019-04-17
Attending: EMERGENCY MEDICINE | Admitting: STUDENT IN AN ORGANIZED HEALTH CARE EDUCATION/TRAINING PROGRAM
Payer: COMMERCIAL

## 2019-04-15 ENCOUNTER — TELEPHONE (OUTPATIENT)
Dept: OTHER | Facility: OTHER | Age: 74
End: 2019-04-15

## 2019-04-15 ENCOUNTER — APPOINTMENT (EMERGENCY)
Dept: RADIOLOGY | Facility: HOSPITAL | Age: 74
End: 2019-04-15
Payer: COMMERCIAL

## 2019-04-15 DIAGNOSIS — Z01.89 ENCOUNTER FOR COMPETENCY EVALUATION: ICD-10-CM

## 2019-04-15 DIAGNOSIS — F41.9 ANXIETY AND DEPRESSION: ICD-10-CM

## 2019-04-15 DIAGNOSIS — I10 BENIGN HYPERTENSION: ICD-10-CM

## 2019-04-15 DIAGNOSIS — R42 POSTURAL DIZZINESS WITH NEAR SYNCOPE: ICD-10-CM

## 2019-04-15 DIAGNOSIS — F32.A DEPRESSION, UNSPECIFIED DEPRESSION TYPE: ICD-10-CM

## 2019-04-15 DIAGNOSIS — R55 POSTURAL DIZZINESS WITH NEAR SYNCOPE: ICD-10-CM

## 2019-04-15 DIAGNOSIS — G31.84 MCI (MILD COGNITIVE IMPAIRMENT): ICD-10-CM

## 2019-04-15 DIAGNOSIS — R29.6 MULTIPLE FALLS: Primary | ICD-10-CM

## 2019-04-15 DIAGNOSIS — G31.9 NEURODEGENERATIVE COGNITIVE IMPAIRMENT (HCC): ICD-10-CM

## 2019-04-15 DIAGNOSIS — R26.9 GAIT ABNORMALITY: ICD-10-CM

## 2019-04-15 DIAGNOSIS — F32.A ANXIETY AND DEPRESSION: ICD-10-CM

## 2019-04-15 PROBLEM — N28.1 RENAL CYST: Status: ACTIVE | Noted: 2019-04-15

## 2019-04-15 LAB
ALBUMIN SERPL BCP-MCNC: 3.4 G/DL (ref 3.5–5)
ALP SERPL-CCNC: 96 U/L (ref 46–116)
ALT SERPL W P-5'-P-CCNC: 20 U/L (ref 12–78)
AMPHETAMINES SERPL QL SCN: NEGATIVE
ANION GAP SERPL CALCULATED.3IONS-SCNC: 8 MMOL/L (ref 4–13)
APTT PPP: 29 SECONDS (ref 24–33)
AST SERPL W P-5'-P-CCNC: 16 U/L (ref 5–45)
BARBITURATES UR QL: NEGATIVE
BASOPHILS # BLD AUTO: 0.05 THOUSANDS/ΜL (ref 0–0.1)
BASOPHILS NFR BLD AUTO: 1 % (ref 0–1)
BENZODIAZ UR QL: NEGATIVE
BILIRUB SERPL-MCNC: 0.3 MG/DL (ref 0.2–1)
BILIRUB UR QL STRIP: NEGATIVE
BUN SERPL-MCNC: 28 MG/DL (ref 5–25)
CALCIUM SERPL-MCNC: 9.4 MG/DL (ref 8.3–10.1)
CHLORIDE SERPL-SCNC: 102 MMOL/L (ref 100–108)
CLARITY UR: CLEAR
CO2 SERPL-SCNC: 26 MMOL/L (ref 21–32)
COCAINE UR QL: NEGATIVE
COLOR UR: NORMAL
CREAT SERPL-MCNC: 0.91 MG/DL (ref 0.6–1.3)
EOSINOPHIL # BLD AUTO: 0.25 THOUSAND/ΜL (ref 0–0.61)
EOSINOPHIL NFR BLD AUTO: 3 % (ref 0–6)
ERYTHROCYTE [DISTWIDTH] IN BLOOD BY AUTOMATED COUNT: 15.7 % (ref 11.6–15.1)
ETHANOL SERPL-MCNC: <3 MG/DL (ref 0–3)
GFR SERPL CREATININE-BSD FRML MDRD: 82 ML/MIN/1.73SQ M
GLUCOSE SERPL-MCNC: 91 MG/DL (ref 65–140)
GLUCOSE UR STRIP-MCNC: NEGATIVE MG/DL
HCT VFR BLD AUTO: 33.4 % (ref 36.5–49.3)
HGB BLD-MCNC: 10.5 G/DL (ref 12–17)
HGB UR QL STRIP.AUTO: NEGATIVE
IMM GRANULOCYTES # BLD AUTO: 0.01 THOUSAND/UL (ref 0–0.2)
IMM GRANULOCYTES NFR BLD AUTO: 0 % (ref 0–2)
INR PPP: 0.95 (ref 0.86–1.16)
KETONES UR STRIP-MCNC: NEGATIVE MG/DL
LEUKOCYTE ESTERASE UR QL STRIP: NEGATIVE
LYMPHOCYTES # BLD AUTO: 1.12 THOUSANDS/ΜL (ref 0.6–4.47)
LYMPHOCYTES NFR BLD AUTO: 15 % (ref 14–44)
MAGNESIUM SERPL-MCNC: 2 MG/DL (ref 1.6–2.6)
MCH RBC QN AUTO: 26.7 PG (ref 26.8–34.3)
MCHC RBC AUTO-ENTMCNC: 31.4 G/DL (ref 31.4–37.4)
MCV RBC AUTO: 85 FL (ref 82–98)
METHADONE UR QL: NEGATIVE
MONOCYTES # BLD AUTO: 0.64 THOUSAND/ΜL (ref 0.17–1.22)
MONOCYTES NFR BLD AUTO: 8 % (ref 4–12)
NEUTROPHILS # BLD AUTO: 5.68 THOUSANDS/ΜL (ref 1.85–7.62)
NEUTS SEG NFR BLD AUTO: 73 % (ref 43–75)
NITRITE UR QL STRIP: NEGATIVE
NRBC BLD AUTO-RTO: 0 /100 WBCS
OPIATES UR QL SCN: NEGATIVE
PCP UR QL: NEGATIVE
PH UR STRIP.AUTO: 6.5 [PH]
PHOSPHATE SERPL-MCNC: 3.2 MG/DL (ref 2.3–4.1)
PLATELET # BLD AUTO: 186 THOUSANDS/UL (ref 149–390)
PMV BLD AUTO: 9.5 FL (ref 8.9–12.7)
POTASSIUM SERPL-SCNC: 3.6 MMOL/L (ref 3.5–5.3)
PROT SERPL-MCNC: 6.7 G/DL (ref 6.4–8.2)
PROT UR STRIP-MCNC: NEGATIVE MG/DL
PROTHROMBIN TIME: 10 SECONDS (ref 9.4–11.7)
RBC # BLD AUTO: 3.93 MILLION/UL (ref 3.88–5.62)
SODIUM SERPL-SCNC: 136 MMOL/L (ref 136–145)
SP GR UR STRIP.AUTO: <=1.005 (ref 1–1.03)
THC UR QL: NEGATIVE
TROPONIN I SERPL-MCNC: <0.02 NG/ML
UROBILINOGEN UR QL STRIP.AUTO: 0.2 E.U./DL
WBC # BLD AUTO: 7.75 THOUSAND/UL (ref 4.31–10.16)

## 2019-04-15 PROCEDURE — 84100 ASSAY OF PHOSPHORUS: CPT | Performed by: EMERGENCY MEDICINE

## 2019-04-15 PROCEDURE — 96360 HYDRATION IV INFUSION INIT: CPT

## 2019-04-15 PROCEDURE — 80053 COMPREHEN METABOLIC PANEL: CPT | Performed by: EMERGENCY MEDICINE

## 2019-04-15 PROCEDURE — 85025 COMPLETE CBC W/AUTO DIFF WBC: CPT | Performed by: EMERGENCY MEDICINE

## 2019-04-15 PROCEDURE — 84484 ASSAY OF TROPONIN QUANT: CPT | Performed by: EMERGENCY MEDICINE

## 2019-04-15 PROCEDURE — 36415 COLL VENOUS BLD VENIPUNCTURE: CPT | Performed by: EMERGENCY MEDICINE

## 2019-04-15 PROCEDURE — 99285 EMERGENCY DEPT VISIT HI MDM: CPT

## 2019-04-15 PROCEDURE — 93005 ELECTROCARDIOGRAM TRACING: CPT

## 2019-04-15 PROCEDURE — 70450 CT HEAD/BRAIN W/O DYE: CPT

## 2019-04-15 PROCEDURE — 80307 DRUG TEST PRSMV CHEM ANLYZR: CPT | Performed by: EMERGENCY MEDICINE

## 2019-04-15 PROCEDURE — 74177 CT ABD & PELVIS W/CONTRAST: CPT

## 2019-04-15 PROCEDURE — 85730 THROMBOPLASTIN TIME PARTIAL: CPT | Performed by: EMERGENCY MEDICINE

## 2019-04-15 PROCEDURE — 99219 PR INITIAL OBSERVATION CARE/DAY 50 MINUTES: CPT | Performed by: STUDENT IN AN ORGANIZED HEALTH CARE EDUCATION/TRAINING PROGRAM

## 2019-04-15 PROCEDURE — 71260 CT THORAX DX C+: CPT

## 2019-04-15 PROCEDURE — 81003 URINALYSIS AUTO W/O SCOPE: CPT | Performed by: EMERGENCY MEDICINE

## 2019-04-15 PROCEDURE — 83735 ASSAY OF MAGNESIUM: CPT | Performed by: EMERGENCY MEDICINE

## 2019-04-15 PROCEDURE — 85610 PROTHROMBIN TIME: CPT | Performed by: EMERGENCY MEDICINE

## 2019-04-15 PROCEDURE — 96361 HYDRATE IV INFUSION ADD-ON: CPT

## 2019-04-15 PROCEDURE — 72125 CT NECK SPINE W/O DYE: CPT

## 2019-04-15 PROCEDURE — 80320 DRUG SCREEN QUANTALCOHOLS: CPT | Performed by: EMERGENCY MEDICINE

## 2019-04-15 RX ORDER — CLONAZEPAM 0.5 MG/1
0.5 TABLET ORAL 2 TIMES DAILY PRN
Status: DISCONTINUED | OUTPATIENT
Start: 2019-04-15 | End: 2019-04-15

## 2019-04-15 RX ADMIN — SODIUM CHLORIDE 1000 ML: 0.9 INJECTION, SOLUTION INTRAVENOUS at 21:09

## 2019-04-15 RX ADMIN — IOHEXOL 100 ML: 350 INJECTION, SOLUTION INTRAVENOUS at 21:54

## 2019-04-16 ENCOUNTER — APPOINTMENT (OUTPATIENT)
Dept: NON INVASIVE DIAGNOSTICS | Facility: HOSPITAL | Age: 74
End: 2019-04-16
Payer: COMMERCIAL

## 2019-04-16 ENCOUNTER — APPOINTMENT (OUTPATIENT)
Dept: RADIOLOGY | Facility: HOSPITAL | Age: 74
End: 2019-04-16
Payer: COMMERCIAL

## 2019-04-16 ENCOUNTER — TELEPHONE (OUTPATIENT)
Dept: FAMILY MEDICINE CLINIC | Facility: CLINIC | Age: 74
End: 2019-04-16

## 2019-04-16 PROBLEM — G31.9 NEURODEGENERATIVE COGNITIVE IMPAIRMENT (HCC): Status: ACTIVE | Noted: 2019-04-16

## 2019-04-16 PROBLEM — F41.9 ANXIETY AND DEPRESSION: Status: ACTIVE | Noted: 2018-08-12

## 2019-04-16 PROBLEM — D64.9 ANEMIA: Status: ACTIVE | Noted: 2019-04-16

## 2019-04-16 LAB
ANION GAP SERPL CALCULATED.3IONS-SCNC: 10 MMOL/L (ref 4–13)
ATRIAL RATE: 63 BPM
BASOPHILS # BLD AUTO: 0.06 THOUSANDS/ΜL (ref 0–0.1)
BASOPHILS NFR BLD AUTO: 1 % (ref 0–1)
BUN SERPL-MCNC: 20 MG/DL (ref 5–25)
CALCIUM SERPL-MCNC: 9.4 MG/DL (ref 8.3–10.1)
CHLORIDE SERPL-SCNC: 104 MMOL/L (ref 100–108)
CO2 SERPL-SCNC: 25 MMOL/L (ref 21–32)
CREAT SERPL-MCNC: 0.73 MG/DL (ref 0.6–1.3)
EOSINOPHIL # BLD AUTO: 0.27 THOUSAND/ΜL (ref 0–0.61)
EOSINOPHIL NFR BLD AUTO: 5 % (ref 0–6)
ERYTHROCYTE [DISTWIDTH] IN BLOOD BY AUTOMATED COUNT: 15.6 % (ref 11.6–15.1)
GFR SERPL CREATININE-BSD FRML MDRD: 91 ML/MIN/1.73SQ M
GLUCOSE P FAST SERPL-MCNC: 77 MG/DL (ref 65–99)
GLUCOSE SERPL-MCNC: 77 MG/DL (ref 65–140)
HCT VFR BLD AUTO: 34.4 % (ref 36.5–49.3)
HGB BLD-MCNC: 10.6 G/DL (ref 12–17)
IMM GRANULOCYTES # BLD AUTO: 0.02 THOUSAND/UL (ref 0–0.2)
IMM GRANULOCYTES NFR BLD AUTO: 0 % (ref 0–2)
LYMPHOCYTES # BLD AUTO: 0.97 THOUSANDS/ΜL (ref 0.6–4.47)
LYMPHOCYTES NFR BLD AUTO: 17 % (ref 14–44)
MCH RBC QN AUTO: 26.1 PG (ref 26.8–34.3)
MCHC RBC AUTO-ENTMCNC: 30.8 G/DL (ref 31.4–37.4)
MCV RBC AUTO: 85 FL (ref 82–98)
MONOCYTES # BLD AUTO: 0.49 THOUSAND/ΜL (ref 0.17–1.22)
MONOCYTES NFR BLD AUTO: 8 % (ref 4–12)
NEUTROPHILS # BLD AUTO: 4.07 THOUSANDS/ΜL (ref 1.85–7.62)
NEUTS SEG NFR BLD AUTO: 69 % (ref 43–75)
NRBC BLD AUTO-RTO: 0 /100 WBCS
P AXIS: 27 DEGREES
PLATELET # BLD AUTO: 197 THOUSANDS/UL (ref 149–390)
PMV BLD AUTO: 9.8 FL (ref 8.9–12.7)
POTASSIUM SERPL-SCNC: 3.5 MMOL/L (ref 3.5–5.3)
PR INTERVAL: 180 MS
QRS AXIS: 36 DEGREES
QRSD INTERVAL: 80 MS
QT INTERVAL: 386 MS
QTC INTERVAL: 395 MS
RBC # BLD AUTO: 4.06 MILLION/UL (ref 3.88–5.62)
SODIUM SERPL-SCNC: 139 MMOL/L (ref 136–145)
T WAVE AXIS: 27 DEGREES
TROPONIN I SERPL-MCNC: 0.02 NG/ML
TROPONIN I SERPL-MCNC: 0.02 NG/ML
VENTRICULAR RATE: 63 BPM
WBC # BLD AUTO: 5.88 THOUSAND/UL (ref 4.31–10.16)

## 2019-04-16 PROCEDURE — 76770 US EXAM ABDO BACK WALL COMP: CPT

## 2019-04-16 PROCEDURE — G8987 SELF CARE CURRENT STATUS: HCPCS

## 2019-04-16 PROCEDURE — 97167 OT EVAL HIGH COMPLEX 60 MIN: CPT

## 2019-04-16 PROCEDURE — 99225 PR SBSQ OBSERVATION CARE/DAY 25 MINUTES: CPT | Performed by: NURSE PRACTITIONER

## 2019-04-16 PROCEDURE — 85025 COMPLETE CBC W/AUTO DIFF WBC: CPT | Performed by: STUDENT IN AN ORGANIZED HEALTH CARE EDUCATION/TRAINING PROGRAM

## 2019-04-16 PROCEDURE — 84484 ASSAY OF TROPONIN QUANT: CPT | Performed by: STUDENT IN AN ORGANIZED HEALTH CARE EDUCATION/TRAINING PROGRAM

## 2019-04-16 PROCEDURE — 97163 PT EVAL HIGH COMPLEX 45 MIN: CPT

## 2019-04-16 PROCEDURE — G8978 MOBILITY CURRENT STATUS: HCPCS

## 2019-04-16 PROCEDURE — 87081 CULTURE SCREEN ONLY: CPT | Performed by: STUDENT IN AN ORGANIZED HEALTH CARE EDUCATION/TRAINING PROGRAM

## 2019-04-16 PROCEDURE — 93010 ELECTROCARDIOGRAM REPORT: CPT | Performed by: INTERNAL MEDICINE

## 2019-04-16 PROCEDURE — G8979 MOBILITY GOAL STATUS: HCPCS

## 2019-04-16 PROCEDURE — 93660 TILT TABLE EVALUATION: CPT

## 2019-04-16 PROCEDURE — 82607 VITAMIN B-12: CPT | Performed by: STUDENT IN AN ORGANIZED HEALTH CARE EDUCATION/TRAINING PROGRAM

## 2019-04-16 PROCEDURE — 99204 OFFICE O/P NEW MOD 45 MIN: CPT | Performed by: INTERNAL MEDICINE

## 2019-04-16 PROCEDURE — 99204 OFFICE O/P NEW MOD 45 MIN: CPT | Performed by: PSYCHIATRY & NEUROLOGY

## 2019-04-16 PROCEDURE — G8988 SELF CARE GOAL STATUS: HCPCS

## 2019-04-16 PROCEDURE — 80048 BASIC METABOLIC PNL TOTAL CA: CPT | Performed by: STUDENT IN AN ORGANIZED HEALTH CARE EDUCATION/TRAINING PROGRAM

## 2019-04-16 PROCEDURE — 97110 THERAPEUTIC EXERCISES: CPT

## 2019-04-16 RX ORDER — POTASSIUM CHLORIDE 20 MEQ/1
40 TABLET, EXTENDED RELEASE ORAL ONCE
Status: COMPLETED | OUTPATIENT
Start: 2019-04-16 | End: 2019-04-16

## 2019-04-16 RX ORDER — BRIMONIDINE TARTRATE, TIMOLOL MALEATE 2; 5 MG/ML; MG/ML
1 SOLUTION/ DROPS OPHTHALMIC 2 TIMES DAILY
Status: DISCONTINUED | OUTPATIENT
Start: 2019-04-16 | End: 2019-04-17 | Stop reason: HOSPADM

## 2019-04-16 RX ORDER — MEMANTINE HYDROCHLORIDE 10 MG/1
10 TABLET ORAL DAILY
Status: DISCONTINUED | OUTPATIENT
Start: 2019-04-16 | End: 2019-04-17 | Stop reason: HOSPADM

## 2019-04-16 RX ORDER — MIRTAZAPINE 15 MG/1
7.5 TABLET, FILM COATED ORAL
Status: DISCONTINUED | OUTPATIENT
Start: 2019-04-16 | End: 2019-04-17 | Stop reason: HOSPADM

## 2019-04-16 RX ORDER — CHOLECALCIFEROL (VITAMIN D3) 125 MCG
2000 CAPSULE ORAL DAILY
Status: DISCONTINUED | OUTPATIENT
Start: 2019-04-16 | End: 2019-04-17 | Stop reason: HOSPADM

## 2019-04-16 RX ORDER — SERTRALINE HYDROCHLORIDE 25 MG/1
25 TABLET, FILM COATED ORAL DAILY
Status: DISCONTINUED | OUTPATIENT
Start: 2019-04-16 | End: 2019-04-16

## 2019-04-16 RX ADMIN — MIRTAZAPINE 7.5 MG: 15 TABLET, FILM COATED ORAL at 21:16

## 2019-04-16 RX ADMIN — POTASSIUM CHLORIDE 40 MEQ: 1500 TABLET, EXTENDED RELEASE ORAL at 10:54

## 2019-04-16 RX ADMIN — SERTRALINE HYDROCHLORIDE 25 MG: 25 TABLET ORAL at 10:54

## 2019-04-16 RX ADMIN — BRIMONIDINE TARTRATE, TIMOLOL MALEATE 1 DROP: 2; 5 SOLUTION/ DROPS OPHTHALMIC at 10:53

## 2019-04-16 RX ADMIN — BIMATOPROST 1 DROP: 0.1 SOLUTION/ DROPS OPHTHALMIC at 09:45

## 2019-04-16 RX ADMIN — MEMANTINE 10 MG: 10 TABLET ORAL at 14:08

## 2019-04-16 RX ADMIN — BRIMONIDINE TARTRATE, TIMOLOL MALEATE 1 DROP: 2; 5 SOLUTION/ DROPS OPHTHALMIC at 18:17

## 2019-04-16 RX ADMIN — CYANOCOBALAMIN TAB 500 MCG 2000 MCG: 500 TAB at 10:54

## 2019-04-17 VITALS
HEART RATE: 63 BPM | HEIGHT: 66 IN | OXYGEN SATURATION: 100 % | BODY MASS INDEX: 21.47 KG/M2 | TEMPERATURE: 97.7 F | SYSTOLIC BLOOD PRESSURE: 162 MMHG | WEIGHT: 133.6 LBS | DIASTOLIC BLOOD PRESSURE: 70 MMHG | RESPIRATION RATE: 20 BRPM

## 2019-04-17 PROBLEM — I95.1 ORTHOSTATIC HYPOTENSION: Status: ACTIVE | Noted: 2019-04-15

## 2019-04-17 LAB
ANION GAP SERPL CALCULATED.3IONS-SCNC: 10 MMOL/L (ref 4–13)
BUN SERPL-MCNC: 19 MG/DL (ref 5–25)
CALCIUM SERPL-MCNC: 10 MG/DL (ref 8.3–10.1)
CHLORIDE SERPL-SCNC: 102 MMOL/L (ref 100–108)
CO2 SERPL-SCNC: 27 MMOL/L (ref 21–32)
CREAT SERPL-MCNC: 0.78 MG/DL (ref 0.6–1.3)
ERYTHROCYTE [DISTWIDTH] IN BLOOD BY AUTOMATED COUNT: 15.6 % (ref 11.6–15.1)
FOLATE SERPL-MCNC: >20 NG/ML (ref 3.1–17.5)
GFR SERPL CREATININE-BSD FRML MDRD: 88 ML/MIN/1.73SQ M
GLUCOSE P FAST SERPL-MCNC: 75 MG/DL (ref 65–99)
GLUCOSE SERPL-MCNC: 75 MG/DL (ref 65–140)
HCT VFR BLD AUTO: 36.5 % (ref 36.5–49.3)
HGB BLD-MCNC: 11.4 G/DL (ref 12–17)
MAGNESIUM SERPL-MCNC: 1.9 MG/DL (ref 1.6–2.6)
MCH RBC QN AUTO: 26.5 PG (ref 26.8–34.3)
MCHC RBC AUTO-ENTMCNC: 31.2 G/DL (ref 31.4–37.4)
MCV RBC AUTO: 85 FL (ref 82–98)
MRSA NOSE QL CULT: NORMAL
PHOSPHATE SERPL-MCNC: 2.7 MG/DL (ref 2.3–4.1)
PLATELET # BLD AUTO: 209 THOUSANDS/UL (ref 149–390)
PMV BLD AUTO: 9.6 FL (ref 8.9–12.7)
POTASSIUM SERPL-SCNC: 3.5 MMOL/L (ref 3.5–5.3)
RBC # BLD AUTO: 4.31 MILLION/UL (ref 3.88–5.62)
SODIUM SERPL-SCNC: 139 MMOL/L (ref 136–145)
VIT B12 SERPL-MCNC: 1248 PG/ML (ref 100–900)
WBC # BLD AUTO: 7.33 THOUSAND/UL (ref 4.31–10.16)

## 2019-04-17 PROCEDURE — 82306 VITAMIN D 25 HYDROXY: CPT | Performed by: NURSE PRACTITIONER

## 2019-04-17 PROCEDURE — 82746 ASSAY OF FOLIC ACID SERUM: CPT | Performed by: NURSE PRACTITIONER

## 2019-04-17 PROCEDURE — 83735 ASSAY OF MAGNESIUM: CPT | Performed by: NURSE PRACTITIONER

## 2019-04-17 PROCEDURE — 99217 PR OBSERVATION CARE DISCHARGE MANAGEMENT: CPT | Performed by: NURSE PRACTITIONER

## 2019-04-17 PROCEDURE — 80048 BASIC METABOLIC PNL TOTAL CA: CPT | Performed by: NURSE PRACTITIONER

## 2019-04-17 PROCEDURE — 99214 OFFICE O/P EST MOD 30 MIN: CPT | Performed by: INTERNAL MEDICINE

## 2019-04-17 PROCEDURE — 84100 ASSAY OF PHOSPHORUS: CPT | Performed by: NURSE PRACTITIONER

## 2019-04-17 PROCEDURE — 85027 COMPLETE CBC AUTOMATED: CPT | Performed by: NURSE PRACTITIONER

## 2019-04-17 RX ORDER — LISINOPRIL 5 MG/1
5 TABLET ORAL DAILY
Status: DISCONTINUED | OUTPATIENT
Start: 2019-04-17 | End: 2019-04-17 | Stop reason: HOSPADM

## 2019-04-17 RX ORDER — LISINOPRIL 5 MG/1
5 TABLET ORAL DAILY
Refills: 0
Start: 2019-04-17 | End: 2019-12-04

## 2019-04-17 RX ORDER — HYDRALAZINE HYDROCHLORIDE 20 MG/ML
5 INJECTION INTRAMUSCULAR; INTRAVENOUS ONCE AS NEEDED
Status: COMPLETED | OUTPATIENT
Start: 2019-04-17 | End: 2019-04-17

## 2019-04-17 RX ORDER — CLONAZEPAM 0.5 MG/1
0.25 TABLET ORAL 2 TIMES DAILY PRN
Qty: 60 TABLET | Refills: 0
Start: 2019-04-17 | End: 2019-12-04

## 2019-04-17 RX ORDER — HALOPERIDOL 5 MG/ML
2 INJECTION INTRAMUSCULAR ONCE AS NEEDED
Status: DISCONTINUED | OUTPATIENT
Start: 2019-04-17 | End: 2019-04-17

## 2019-04-17 RX ORDER — POTASSIUM CHLORIDE 20 MEQ/1
40 TABLET, EXTENDED RELEASE ORAL ONCE
Status: COMPLETED | OUTPATIENT
Start: 2019-04-17 | End: 2019-04-17

## 2019-04-17 RX ORDER — MEMANTINE HYDROCHLORIDE 10 MG/1
10 TABLET ORAL DAILY
Refills: 0
Start: 2019-04-18 | End: 2019-12-04

## 2019-04-17 RX ORDER — MIRTAZAPINE 7.5 MG/1
7.5 TABLET, FILM COATED ORAL
Refills: 0
Start: 2019-04-17

## 2019-04-17 RX ADMIN — BRIMONIDINE TARTRATE, TIMOLOL MALEATE 1 DROP: 2; 5 SOLUTION/ DROPS OPHTHALMIC at 08:25

## 2019-04-17 RX ADMIN — CYANOCOBALAMIN TAB 500 MCG 2000 MCG: 500 TAB at 08:25

## 2019-04-17 RX ADMIN — POTASSIUM CHLORIDE 40 MEQ: 1500 TABLET, EXTENDED RELEASE ORAL at 14:49

## 2019-04-17 RX ADMIN — BIMATOPROST 1 DROP: 0.1 SOLUTION/ DROPS OPHTHALMIC at 08:26

## 2019-04-17 RX ADMIN — SERTRALINE HYDROCHLORIDE 50 MG: 50 TABLET ORAL at 08:30

## 2019-04-17 RX ADMIN — MEMANTINE 10 MG: 10 TABLET ORAL at 08:25

## 2019-04-17 RX ADMIN — LISINOPRIL 5 MG: 5 TABLET ORAL at 14:49

## 2019-04-17 RX ADMIN — HYDRALAZINE HYDROCHLORIDE 5 MG: 20 INJECTION INTRAMUSCULAR; INTRAVENOUS at 06:16

## 2019-04-18 ENCOUNTER — TRANSITIONAL CARE MANAGEMENT (OUTPATIENT)
Dept: FAMILY MEDICINE CLINIC | Facility: CLINIC | Age: 74
End: 2019-04-18

## 2019-04-20 LAB
25(OH)D2 SERPL-MCNC: <1 NG/ML
25(OH)D3 SERPL-MCNC: 29 NG/ML
25(OH)D3+25(OH)D2 SERPL-MCNC: 29 NG/ML

## 2019-04-23 PROCEDURE — 93660 TILT TABLE EVALUATION: CPT | Performed by: INTERNAL MEDICINE

## 2019-04-24 ENCOUNTER — TELEPHONE (OUTPATIENT)
Dept: FAMILY MEDICINE CLINIC | Facility: CLINIC | Age: 74
End: 2019-04-24

## 2019-04-29 ENCOUNTER — TELEPHONE (OUTPATIENT)
Dept: OTOLARYNGOLOGY | Facility: CLINIC | Age: 74
End: 2019-04-29

## 2019-11-27 ENCOUNTER — APPOINTMENT (EMERGENCY)
Dept: RADIOLOGY | Facility: HOSPITAL | Age: 74
End: 2019-11-27
Payer: COMMERCIAL

## 2019-11-27 ENCOUNTER — HOSPITAL ENCOUNTER (EMERGENCY)
Facility: HOSPITAL | Age: 74
Discharge: OTHER INSTITUTION | End: 2019-11-28
Attending: EMERGENCY MEDICINE | Admitting: EMERGENCY MEDICINE
Payer: COMMERCIAL

## 2019-11-27 DIAGNOSIS — F32.A DEPRESSION: Primary | ICD-10-CM

## 2019-11-27 LAB
ALBUMIN SERPL BCP-MCNC: 2.7 G/DL (ref 3.5–5)
ALP SERPL-CCNC: 91 U/L (ref 46–116)
ALT SERPL W P-5'-P-CCNC: 15 U/L (ref 12–78)
ANION GAP SERPL CALCULATED.3IONS-SCNC: 6 MMOL/L (ref 4–13)
APAP SERPL-MCNC: <2 UG/ML (ref 10–20)
AST SERPL W P-5'-P-CCNC: 13 U/L (ref 5–45)
ATRIAL RATE: 69 BPM
BASOPHILS # BLD AUTO: 0.05 THOUSANDS/ΜL (ref 0–0.1)
BASOPHILS NFR BLD AUTO: 1 % (ref 0–1)
BILIRUB SERPL-MCNC: 0.2 MG/DL (ref 0.2–1)
BUN SERPL-MCNC: 21 MG/DL (ref 5–25)
CALCIUM SERPL-MCNC: 8.5 MG/DL (ref 8.3–10.1)
CHLORIDE SERPL-SCNC: 98 MMOL/L (ref 100–108)
CO2 SERPL-SCNC: 27 MMOL/L (ref 21–32)
CREAT SERPL-MCNC: 0.72 MG/DL (ref 0.6–1.3)
EOSINOPHIL # BLD AUTO: 0.2 THOUSAND/ΜL (ref 0–0.61)
EOSINOPHIL NFR BLD AUTO: 3 % (ref 0–6)
ERYTHROCYTE [DISTWIDTH] IN BLOOD BY AUTOMATED COUNT: 15.8 % (ref 11.6–15.1)
ETHANOL SERPL-MCNC: <3 MG/DL (ref 0–3)
GFR SERPL CREATININE-BSD FRML MDRD: 91 ML/MIN/1.73SQ M
GLUCOSE SERPL-MCNC: 94 MG/DL (ref 65–140)
HCT VFR BLD AUTO: 32.1 % (ref 36.5–49.3)
HGB BLD-MCNC: 10.3 G/DL (ref 12–17)
IMM GRANULOCYTES # BLD AUTO: 0.04 THOUSAND/UL (ref 0–0.2)
IMM GRANULOCYTES NFR BLD AUTO: 1 % (ref 0–2)
LYMPHOCYTES # BLD AUTO: 1.22 THOUSANDS/ΜL (ref 0.6–4.47)
LYMPHOCYTES NFR BLD AUTO: 17 % (ref 14–44)
MCH RBC QN AUTO: 25.4 PG (ref 26.8–34.3)
MCHC RBC AUTO-ENTMCNC: 32.1 G/DL (ref 31.4–37.4)
MCV RBC AUTO: 79 FL (ref 82–98)
MONOCYTES # BLD AUTO: 0.48 THOUSAND/ΜL (ref 0.17–1.22)
MONOCYTES NFR BLD AUTO: 7 % (ref 4–12)
NEUTROPHILS # BLD AUTO: 5.21 THOUSANDS/ΜL (ref 1.85–7.62)
NEUTS SEG NFR BLD AUTO: 71 % (ref 43–75)
NRBC BLD AUTO-RTO: 0 /100 WBCS
P AXIS: 49 DEGREES
PLATELET # BLD AUTO: 291 THOUSANDS/UL (ref 149–390)
PMV BLD AUTO: 8.3 FL (ref 8.9–12.7)
POTASSIUM SERPL-SCNC: 4.5 MMOL/L (ref 3.5–5.3)
PR INTERVAL: 202 MS
PROT SERPL-MCNC: 5.9 G/DL (ref 6.4–8.2)
QRS AXIS: 5 DEGREES
QRSD INTERVAL: 82 MS
QT INTERVAL: 376 MS
QTC INTERVAL: 402 MS
RBC # BLD AUTO: 4.05 MILLION/UL (ref 3.88–5.62)
SALICYLATES SERPL-MCNC: <3 MG/DL (ref 3–20)
SODIUM SERPL-SCNC: 131 MMOL/L (ref 136–145)
T WAVE AXIS: 43 DEGREES
VENTRICULAR RATE: 69 BPM
WBC # BLD AUTO: 7.2 THOUSAND/UL (ref 4.31–10.16)

## 2019-11-27 PROCEDURE — 80320 DRUG SCREEN QUANTALCOHOLS: CPT | Performed by: EMERGENCY MEDICINE

## 2019-11-27 PROCEDURE — 36415 COLL VENOUS BLD VENIPUNCTURE: CPT | Performed by: EMERGENCY MEDICINE

## 2019-11-27 PROCEDURE — 71045 X-RAY EXAM CHEST 1 VIEW: CPT

## 2019-11-27 PROCEDURE — 80053 COMPREHEN METABOLIC PANEL: CPT | Performed by: EMERGENCY MEDICINE

## 2019-11-27 PROCEDURE — 99285 EMERGENCY DEPT VISIT HI MDM: CPT

## 2019-11-27 PROCEDURE — 93010 ELECTROCARDIOGRAM REPORT: CPT | Performed by: INTERNAL MEDICINE

## 2019-11-27 PROCEDURE — 93005 ELECTROCARDIOGRAM TRACING: CPT

## 2019-11-27 PROCEDURE — 80329 ANALGESICS NON-OPIOID 1 OR 2: CPT | Performed by: EMERGENCY MEDICINE

## 2019-11-27 PROCEDURE — 85025 COMPLETE CBC W/AUTO DIFF WBC: CPT | Performed by: EMERGENCY MEDICINE

## 2019-11-27 RX ORDER — LATANOPROST 50 UG/ML
1 SOLUTION/ DROPS OPHTHALMIC
COMMUNITY

## 2019-11-27 RX ORDER — MIRTAZAPINE 15 MG/1
15 TABLET, FILM COATED ORAL
COMMUNITY

## 2019-11-27 RX ORDER — ACETAMINOPHEN 325 MG/1
650 TABLET ORAL ONCE
Status: COMPLETED | OUTPATIENT
Start: 2019-11-27 | End: 2019-11-27

## 2019-11-27 RX ADMIN — ACETAMINOPHEN 650 MG: 325 TABLET, FILM COATED ORAL at 23:58

## 2019-11-27 NOTE — ED PROVIDER NOTES
History  Chief Complaint   Patient presents with    Psychiatric Evaluation     pt from [facility] due to wrapping call bell around his neck  pt hx dementia-states he just wants to die because no one wants him  States he has never taken action but has thought of stabbing self in chest with knife     66 male here from [facility] after he tried to wrap the call bell around his neck  States he just wants to end it all  No fevers or chills  No other complaints  Pt is awake however his mental capacity is questionable   on mini mental exam here in ED  Crisis is involved and will contact family members for further evaluation  History provided by:  Patient   used: No        Prior to Admission Medications   Prescriptions Last Dose Informant Patient Reported? Taking? Galcanezumab-gnlm (EMGALITY) 120 MG/ML SOAJ Not Taking at Unknown time Self No No   Sig: Administer 2 injections 2-3 hours apart the first time and then 1 injection every 30 days     Patient not taking: Reported on 2019   Mirabegron ER (MYRBETRIQ) 50 MG  at 1000  Yes Yes   Sig: Take by mouth daily   bimatoprost (LUMIGAN) 0 01 % ophthalmic drops Not Taking at Unknown time  Yes No   Si drop daily at bedtime   brimonidine-timolol (COMBIGAN) 0 2-0 5 % 2019 at 0800 Self Yes No   Sig: Administer 1 drop to both eyes 2 (two) times a day    clonazePAM (KlonoPIN) 0 5 mg tablet Not Taking at Unknown time  No No   Sig: Take 0 5 tablets (0 25 mg total) by mouth 2 (two) times a day as needed for anxiety One tab po bid prn anxiety   Patient not taking: Reported on 2019   cyanocobalamin 2000 MCG tablet 2019 at 0900 Self Yes No   Sig: Take 2,000 mcg by mouth daily   latanoprost (XALATAN) 0 005 % ophthalmic solution 2019  Yes Yes   Sig: Administer 1 drop to both eyes daily at bedtime   lisinopril (ZESTRIL) 5 mg tablet Not Taking at Unknown time  No No   Sig: Take 1 tablet (5 mg total) by mouth daily   Patient not taking: Reported on 11/27/2019   memantine (NAMENDA) 10 mg tablet 11/27/2019 at 0900  No No   Sig: Take 1 tablet (10 mg total) by mouth daily   mirtazapine (REMERON) 15 mg tablet Past Week at 2100  Yes Yes   Sig: Take 15 mg by mouth daily Monday, Wednesday, Friday, Saturday, Sunday   mirtazapine (REMERON) 7 5 MG tablet 11/26/2019 at 2100  No No   Sig: Take 1 tablet (7 5 mg total) by mouth daily at bedtime   Patient taking differently: Take 7 5 mg by mouth daily at bedtime Tuesday and Thursday   sertraline (ZOLOFT) 50 mg tablet Not Taking at Unknown time Self No No   Sig: Take 1 tablet (50 mg total) by mouth daily   Patient not taking: Reported on 11/27/2019      Facility-Administered Medications: None       Past Medical History:   Diagnosis Date    Abnormal blood chemistry 09/21/2009    Arthritis 05/09/2011    localized primary osteoarthritis of lower leg    Arthritis     Backache     Benign essential HTN     last assessed 12/28/17    Cataract     Depression with anxiety     Diarrhea     D[facility]deep [facility] thrombosis) ([facility]) 09/09/2011    Right Leg    Generalized anxiety disorder 08/10/2009    Glaucoma     last assesssed 2/11/13    Hearing problem     History of herniated intervertebral disc 04/28/2004    Memory loss     Prostate cancer ([facility] [address] )     PVD (peripheral vascular disease) ([facility] [address] ) 04/28/2004    Skin cancer of nose     Skin cancer of nose        Past Surgical History:   Procedure Laterality Date    BUNIONECTOMY      simple bunion exostectomy (silver procedure)    CATARACT EXTRACTION      resolved 2012    CYSTOSCOPY W/ URETERAL STENT PLACEMENT      EYE SURGERY Bilateral     laser, left-2012 , right-2015    FRACTURE SURGERY      spinal, resolved 8/15/10    HERNIA REPAIR      inguinal sliding    OTHER SURGICAL HISTORY      needle cath placement for brachyther applic into genitalia    PROSTATE BIOPSY      needle bx    RENAL ARTERY STENT      REPLACEMENT TOTAL KNEE BILATERAL right-2012 , left-2016       Family History   Problem Relation Age of Onset    Alzheimer's disease Mother     Stroke Father         CVA    Diabetes Sister      I have reviewed and agree with the history as documented  Social History     Tobacco Use    Smoking status: Never Smoker    Smokeless tobacco: Never Used   Substance Use Topics    Alcohol use: Never     Frequency: Never    Drug use: Never        Review of Systems   Constitutional: Negative for activity change, chills, diaphoresis and fever  HENT: Negative for congestion, ear pain, nosebleeds, sore throat, trouble swallowing and voice change  Eyes: Negative for pain, discharge and redness  Respiratory: Negative for apnea, cough, choking, shortness of breath, wheezing and stridor  Cardiovascular: Negative for chest pain and palpitations  Gastrointestinal: Negative for abdominal distention, abdominal pain, constipation, diarrhea, nausea and vomiting  Endocrine: Negative for polydipsia  Genitourinary: Negative for difficulty urinating, dysuria, flank pain, frequency, hematuria and urgency  Musculoskeletal: Negative for back pain, gait problem, joint swelling, myalgias, neck pain and neck stiffness  Skin: Negative for pallor and rash  Neurological: Negative for dizziness, tremors, syncope, speech difficulty, weakness, numbness and headaches  Hematological: Negative for adenopathy  Psychiatric/Behavioral: Positive for suicidal ideas  Negative for confusion, hallucinations and self-injury  The patient is not nervous/anxious  Physical Exam  Physical Exam   Constitutional: He is oriented to person, place, and time  Vital signs are normal  He appears well-developed and well-nourished  No distress  HENT:   Head: Normocephalic and atraumatic     Right Ear: External ear normal    Left Ear: External ear normal    Nose: Nose normal    Mouth/Throat: Oropharynx is clear and moist    Eyes: Pupils are equal, round, and reactive to light  Conjunctivae are normal    Neck: Normal range of motion  Neck supple  Cardiovascular: Normal rate, regular rhythm, normal heart sounds and intact distal pulses  Pulmonary/Chest: Effort normal and breath sounds normal    Abdominal: Soft  Bowel sounds are normal    Musculoskeletal: Normal range of motion  Neurological: He is alert and oriented to person, place, and time  He has normal reflexes  Skin: Skin is warm and dry  He is not diaphoretic  Nursing note and vitals reviewed  Vital Signs  ED Triage Vitals   Temperature Pulse Respirations Blood Pressure SpO2   11/27/19 1715 11/27/19 1715 11/27/19 1715 11/27/19 1715 11/27/19 1715   98 7 °F (37 1 °C) 76 18 139/71 99 %      Temp Source Heart Rate Source Patient Position - Orthostatic VS BP Location FiO2 (%)   11/27/19 1715 11/27/19 1715 11/27/19 2357 11/27/19 2357 --   Tympanic Monitor Sitting Right arm       Pain Score       11/28/19 0317       No Pain           Vitals:    11/27/19 1715 11/27/19 2357 11/28/19 0317 11/28/19 0758   BP: 139/71 144/68 169/74 166/80   Pulse: 76 66 69 67   Patient Position - Orthostatic VS:  Sitting Sitting          Visual Acuity      ED Medications  Medications   acetaminophen (TYLENOL) tablet 650 mg (650 mg Oral Given 11/27/19 2358)       Diagnostic Studies  Results Reviewed     Procedure Component Value Units Date/Time    Rapid drug screen, urine [[id number]]  (Normal) Collected:  11/28/19 0053    Lab Status:  Final result Specimen:  Urine, Catheter Updated:  11/28/19 0121     Amph/Meth UR Negative     Barbiturate Ur Negative     Benzodiazepine Urine Negative     Cocaine Urine Negative     Methadone Urine Negative     Opiate Urine Negative     PCP Ur Negative     THC Urine Negative    Narrative:       FOR MEDICAL PURPOSES ONLY  IF CONFIRMATION NEEDED PLEASE CONTACT THE LAB WITHIN 5 DAYS      Drug Screen Cutoff Levels:  AMPHETAMINE/METHAMPHETAMINES  1000 ng/mL  BARBITURATES     200 ng/mL  BENZODIAZEPINES     200 ng/mL  COCAINE      300 ng/mL  METHADONE      300 ng/mL  OPIATES      300 ng/mL  PHENCYCLIDINE     25 ng/mL  THC       50 ng/mL      Urine Microscopic [[id number]]  (Abnormal) Collected:  11/28/19 0053    Lab Status:  Final result Specimen:  Urine, Straight Cath Updated:  11/28/19 0111     RBC, UA None Seen /hpf      WBC, UA 30-50 /hpf      Epithelial Cells None Seen /hpf      Bacteria, UA Innumerable /hpf     UA (URINE) with reflex to Scope [[id number]]  (Abnormal) Collected:  11/28/19 0053    Lab Status:  Final result Specimen:  Urine, Straight Cath Updated:  11/28/19 0104     Color, UA Light Yellow     Clarity, UA Slightly Cloudy     Specific [place], UA 1 010     pH, UA 7 0     Leukocytes, UA Large     Nitrite, UA Negative     Protein, UA Negative mg/dl      Glucose, UA Negative mg/dl      Ketones, UA Negative mg/dl      Urobilinogen, UA 0 2 E U /dl      Bilirubin, UA Negative     Blood, UA Trace-Intact    Acetaminophen level-If concentration is detectable, please discuss with medical  on call   [[id number]]  (Abnormal) Collected:  11/27/19 1738    Lab Status:  Final result Specimen:  Blood from Arm, Left Updated:  11/27/19 1813     Acetaminophen Level <2 ug/mL     Comprehensive metabolic panel [[id number]]  (Abnormal) Collected:  11/27/19 1738    Lab Status:  Final result Specimen:  Blood from Arm, Left Updated:  11/27/19 1810     Sodium 131 mmol/L      Potassium 4 5 mmol/L      Chloride 98 mmol/L      CO2 27 mmol/L      ANION GAP 6 mmol/L      BUN 21 mg/dL      Creatinine 0 72 mg/dL      Glucose 94 mg/dL      Calcium 8 5 mg/dL      AST 13 U/L      ALT 15 U/L      Alkaline Phosphatase 91 U/L      Total Protein 5 9 g/dL      Albumin 2 7 g/dL      Total Bilirubin 0 20 mg/dL      eGFR 91 ml/min/1 73sq m     Narrative:       Jarvis guidelines for Chronic Kidney Disease (CKD):     Stage 1 with normal or high GFR (GFR > 90 mL/min/1 73 square meters)    Stage 2 Mild CKD (GFR = 60-89 mL/min/1 73 square meters)    Stage 3A Moderate CKD (GFR = 45-59 mL/min/1 73 square meters)    Stage 3B Moderate CKD (GFR = 30-44 mL/min/1 73 square meters)    Stage 4 Severe CKD (GFR = 15-29 mL/min/1 73 square meters)    Stage 5 End Stage CKD (GFR <15 mL/min/1 73 square meters)  Note: GFR calculation is accurate only with a steady state creatinine    Salicylate level [[id number]]  (Abnormal) Collected:  11/27/19 1738    Lab Status:  Final result Specimen:  Blood from Arm, Left Updated:  77/73/16 3864     Salicylate Lvl <3 mg/dL     Ethanol [[id number]]  (Normal) Collected:  11/27/19 1738    Lab Status:  Final result Specimen:  Blood from Arm, Left Updated:  11/27/19 1805     Ethanol Lvl <3 mg/dL     CBC and differential [[id number]]  (Abnormal) Collected:  11/27/19 1738    Lab Status:  Final result Specimen:  Blood from Arm, Left Updated:  11/27/19 1743     WBC 7 20 Thousand/uL      RBC 4 05 Million/uL      Hemoglobin 10 3 g/dL      Hematocrit 32 1 %      MCV 79 fL      MCH 25 4 pg      MCHC 32 1 g/dL      RDW 15 8 %      MPV 8 3 fL      Platelets 034 Thousands/uL      nRBC 0 /100 WBCs      Neutrophils Relative 71 %      Immat GRANS % 1 %      Lymphocytes Relative 17 %      Monocytes Relative 7 %      Eosinophils Relative 3 %      Basophils Relative 1 %      Neutrophils Absolute 5 21 Thousands/µL      Immature Grans Absolute 0 04 Thousand/uL      Lymphocytes Absolute 1 22 Thousands/µL      Monocytes Absolute 0 48 Thousand/µL      Eosinophils Absolute 0 20 Thousand/µL      Basophils Absolute 0 05 Thousands/µL                  XR chest 1 view portable   Final Result by Naga Strickland DO (11/27 1947)      No acute cardiopulmonary disease              Workstation performed: [id number]                    Procedures  Procedures       ED Course                               MDM  Number of Diagnoses or Management Options  Diagnosis management comments: Patient is medically clear for psychiatric treatment and/or evaluation  Disposition  Final diagnoses:   Depression     Time reflects when diagnosis was documented in both MDM as applicable and the Disposition within this note     Time User Action Codes Description Comment    11/29/2019  3:56 PM Damon Mayers Add [F32 9] Depression       ED Disposition     ED Disposition Condition Date/Time Comment    Discharge  Thu Nov 28, 2019  8:33 AM Isaías Mcfarlane discharge to home/self care  Follow-up Information    None         Discharge Medication List as of 11/28/2019  8:33 AM      CONTINUE these medications which have NOT CHANGED    Details   latanoprost (XALATAN) 0 005 % ophthalmic solution Administer 1 drop to both eyes daily at bedtime, Historical Med      Mirabegron ER (MYRBETRIQ) 50 MG TB24 Take by mouth daily, Historical Med      !! mirtazapine (REMERON) 15 mg tablet Take 15 mg by mouth daily Monday, Wednesday, Friday, Saturday, Sunday, Historical Med      bimatoprost (LUMIGAN) 0 01 % ophthalmic drops 1 drop daily at bedtime, Historical Med      brimonidine-timolol (COMBIGAN) 0 2-0 5 % Administer 1 drop to both eyes 2 (two) times a day , Historical Med      clonazePAM (KlonoPIN) 0 5 mg tablet Take 0 5 tablets (0 25 mg total) by mouth 2 (two) times a day as needed for anxiety One tab po bid prn anxiety, Starting Wed 4/17/2019, No Print      cyanocobalamin 2000 MCG tablet Take 2,000 mcg by mouth daily, Historical Med      Galcanezumab-gnlm (EMGALITY) 120 MG/ML SOAJ Administer 2 injections 2-3 hours apart the first time and then 1 injection every 30 days  , Normal      lisinopril (ZESTRIL) 5 mg tablet Take 1 tablet (5 mg total) by mouth daily, Starting Wed 4/17/2019, No Print      memantine (NAMENDA) 10 mg tablet Take 1 tablet (10 mg total) by mouth daily, Starting Thu 4/18/2019, No Print      !! mirtazapine (REMERON) 7 5 MG tablet Take 1 tablet (7 5 mg total) by mouth daily at bedtime, Starting Wed 4/17/2019, No Print      sertraline (ZOLOFT) 50 mg tablet Take 1 tablet (50 mg total) by mouth daily, Starting Mon 12/24/2018, Normal       !! - Potential duplicate medications found  Please discuss with provider  No discharge procedures on file      ED Provider  Electronically Signed by           Shira Murray DO  11/29/19 8831

## 2019-11-27 NOTE — ED NOTES
65 yo MWM presents to ER via ambulance with police, from [facility], due to verbalizing suicidal ideations with plans  The patient is known to PES by a contact on 4/15/19  A Folstein MMSE was done to determine if the patient would understand "informed consent" since there is a known diagnosis of dementia - the patient scored 14/28 - demonstrating severe impairment in short term memory - was was somewhat oriented to pace and person  The patient will not be able to make decisions on his own behalf  Collateral with [facility] staff, [id number] [phone]: "About 15:00 - the patient wouldn't stay in bed (the patient is not ambulatory) - when staff went to check on him again; the patient stated he wanted to kill himself - repeated the statement again - patient was asked how would you do that; he responded with saying - I would take the call ball and wrap it around my neck - patient then attempted to do that and was stopped by staff; staff then asked what he would do - patient responded - throw himself on the floor; this is new behavior from this patient"  Collateral with patient's wife, Romi Oneill: "Patient had seemed ok - visit twice per week; the patient called last night - he never knows what day it is - he said nobody knows and then hung up the phone; the patient has gone down hill so fast - his mother had dementia also; patient still thinks he can come home; the patient asks about his truck which we sold  Wife is in agreement that patient can not make decisions on his own behalf"

## 2019-11-27 NOTE — ED NOTES
Received pt from the previous shift, pt is laying quietly in the stretcher, no distress is noted at this time  Offered pt and visitor nourishment and fluids however they declined at this time  Updated pt and family on the status of the pt's care at this time       Mando Villalba RN  11/27/19 9229

## 2019-11-28 VITALS
RESPIRATION RATE: 18 BRPM | OXYGEN SATURATION: 98 % | HEART RATE: 67 BPM | DIASTOLIC BLOOD PRESSURE: 80 MMHG | SYSTOLIC BLOOD PRESSURE: 166 MMHG | TEMPERATURE: 97.7 F

## 2019-11-28 LAB
AMPHETAMINES SERPL QL SCN: NEGATIVE
BACTERIA UR QL AUTO: ABNORMAL /HPF
BARBITURATES UR QL: NEGATIVE
BENZODIAZ UR QL: NEGATIVE
BILIRUB UR QL STRIP: NEGATIVE
CLARITY UR: ABNORMAL
COCAINE UR QL: NEGATIVE
COLOR UR: ABNORMAL
GLUCOSE UR STRIP-MCNC: NEGATIVE MG/DL
HGB UR QL STRIP.AUTO: ABNORMAL
KETONES UR STRIP-MCNC: NEGATIVE MG/DL
LEUKOCYTE ESTERASE UR QL STRIP: ABNORMAL
METHADONE UR QL: NEGATIVE
NITRITE UR QL STRIP: NEGATIVE
NON-SQ EPI CELLS URNS QL MICRO: ABNORMAL /HPF
OPIATES UR QL SCN: NEGATIVE
PCP UR QL: NEGATIVE
PH UR STRIP.AUTO: 7 [PH]
PROT UR STRIP-MCNC: NEGATIVE MG/DL
RBC #/AREA URNS AUTO: ABNORMAL /HPF
SP GR UR STRIP.AUTO: 1.01 (ref 1–1.03)
THC UR QL: NEGATIVE
UROBILINOGEN UR QL STRIP.AUTO: 0.2 E.U./DL
WBC #/AREA URNS AUTO: ABNORMAL /HPF

## 2019-11-28 PROCEDURE — 81001 URINALYSIS AUTO W/SCOPE: CPT | Performed by: EMERGENCY MEDICINE

## 2019-11-28 PROCEDURE — 80307 DRUG TEST PRSMV CHEM ANLYZR: CPT | Performed by: EMERGENCY MEDICINE

## 2019-11-28 NOTE — ED NOTES
Went into pt's room to administer Tylenol, pt is sleeping at this time  Will administer if the pt wakes  Pt's wife in the room with the pt        Ilan Blackwood RN  11/27/19 4384

## 2019-11-28 NOTE — ED NOTES
Pt's wife is planning to stay the night, second stretcher was placed in the room for her  Pt is calm and cooperative and resting quietly        Kristin Leonard RN  11/27/19 1942

## 2019-11-28 NOTE — ED NOTES
Family guidance in the room with pt at this time  Pt's wife is also in the room with him        Kyle Harrington RN  11/28/19 0001

## 2019-11-28 NOTE — ED NOTES
Patient requested a Tylenol for a headache in order to help him sleep  RN notified  Patient noted to have eaten the sandwich he was provided with and offers no other complaints other than headache at this time  1:1 continues with this tech            Jesse Plascencia  11/27/19 2128

## 2019-11-28 NOTE — ED NOTES
Pt repositioned in the bed, turkey sandwich provided and pt's wife is assisting pt in eating  Pt noted to have a wet depend, pt changed and eric area cleansed  Will monitor and straight cath for urine specimen if neccessary       Kyle Harrington RN  11/27/19 2110

## 2019-11-28 NOTE — ED NOTES
Pt noted to be laying in bed with eyes closed, no distress noted at this time        Adriana Atkinson, RN  11/28/19 2200

## 2019-11-28 NOTE — ED NOTES
PT continues to rest quietly in the stretcher, no distress is noted at this time  Pt's wife remains at bedside        Mando Villalba RN  11/27/19 1733

## 2019-11-28 NOTE — ED NOTES
Family guidance states pt to return to NH, she spoke with NH staff, they will consult Dr Alicia Londono for patient and remove risks from room   [facility] called for transport,  time 8am     Shola Sabillon RN  11/28/19 0880

## 2019-11-28 NOTE — ED NOTES
Pt is laying quietly in the stretcher, no distress is noted  Pt's wife continues at the bedside with him        Galvin Fabry, RN  11/28/19 9958

## 2019-11-28 NOTE — ED NOTES
Pt noted to have soiled his depend  Pt was cleansed and new depend was placed  Pt is calm and cooperative       Flor López RN  11/28/19 8703

## 2019-11-28 NOTE — ED NOTES
Spoke with staff from the [facility], they are aware that pt will be transported back around 0800        [place] DOROTA Cason  11/28/19 7967

## 2019-11-28 NOTE — ED NOTES
Faxed chart to [address] @ 19:40 - called to verify receipt and spoke with Beverley Peterson - he leaves at 21:00 and Sydney Cote is @ [facility]; therefore the screening will go to the overnight staff  ER Rn noticed some Rx changes on the patient's Rx list which may be contributing to his current symptoms

## 2019-11-28 NOTE — ED NOTES
Pt was repositioned in the stretcher, pt now laying on right side and propped with pillows for comfort  Pt provided with water to drink, pt is calm and thankful to staff members        Adriana Atkinson RN  11/28/19 6460

## 2019-12-04 ENCOUNTER — TELEPHONE (OUTPATIENT)
Dept: FAMILY MEDICINE CLINIC | Facility: CLINIC | Age: 74
End: 2019-12-04

## 2019-12-04 ENCOUNTER — HOSPITAL ENCOUNTER (EMERGENCY)
Facility: HOSPITAL | Age: 74
Discharge: HOME | End: 2019-12-04
Attending: EMERGENCY MEDICINE
Payer: COMMERCIAL

## 2019-12-04 VITALS
DIASTOLIC BLOOD PRESSURE: 57 MMHG | BODY MASS INDEX: 25.41 KG/M2 | SYSTOLIC BLOOD PRESSURE: 115 MMHG | RESPIRATION RATE: 18 BRPM | OXYGEN SATURATION: 95 % | HEART RATE: 86 BPM | TEMPERATURE: 98.3 F | WEIGHT: 157.41 LBS

## 2019-12-04 DIAGNOSIS — R45.851 DEPRESSION WITH SUICIDAL IDEATION: Primary | ICD-10-CM

## 2019-12-04 DIAGNOSIS — F32.A DEPRESSION WITH SUICIDAL IDEATION: Primary | ICD-10-CM

## 2019-12-04 PROCEDURE — 99284 EMERGENCY DEPT VISIT MOD MDM: CPT

## 2019-12-04 RX ORDER — DOCUSATE SODIUM 100 MG/1
100 CAPSULE, LIQUID FILLED ORAL 2 TIMES DAILY
COMMUNITY

## 2019-12-04 RX ORDER — ACETAMINOPHEN 325 MG/1
650 TABLET ORAL ONCE
Status: COMPLETED | OUTPATIENT
Start: 2019-12-04 | End: 2019-12-04

## 2019-12-04 RX ORDER — FLUTICASONE PROPIONATE 50 MCG
1 SPRAY, SUSPENSION (ML) NASAL DAILY
COMMUNITY

## 2019-12-04 RX ADMIN — ACETAMINOPHEN 650 MG: 325 TABLET, FILM COATED ORAL at 18:47

## 2019-12-04 NOTE — ED PROVIDER NOTES
History  Chief Complaint   Patient presents with    Psychiatric Evaluation     Arrives BLS from [facility]  Per transfer sheet " Threatening to hurt his self and others : suicidal ideations " Patient on arrival is angry, state he is tired of being told what to do and he kai making statements , but then when asked if he has thoughts to kill self he states " not yet angrily "  Confused to time   Patient is 68-year-old male sent in from the nursing home  Patient has dementia he just keeps saying that he wants to die  Patient is not offering any physical complaints at this time  Patient actually was seen here approximately 1 week ago for similar complaint of wrapping a call bell cord around his neck  He was screened at that time and sent back to the nursing home  Prior to Admission Medications   Prescriptions Last Dose Informant Patient Reported? Taking?    Mirabegron ER (MYRBETRIQ) 50 MG  at Unknown time  Yes Yes   Sig: Take by mouth daily   brimonidine-timolol (COMBIGAN) 0 2-0 5 % 2019 at 0800 Self Yes Yes   Sig: Administer 1 drop to both eyes 2 (two) times a day    cyanocobalamin (VITAMIN B-12) 1000 MCG tablet 2019 at Unknown time Outside Facility ([address] [place] [address]) Yes Yes   Sig: Take 2,000 mcg by mouth daily    docusate sodium (COLACE) 100 mg capsule 2019 at 0900 Outside Facility (Specify) Yes Yes   Sig: Take 100 mg by mouth 2 (two) times a day   fluticasone (FLONASE) 50 mcg/act nasal spray 2019 at Unknown time Outside Facility ([address] [place] [address]) Yes Yes   Si spray into each nostril daily For 2 weeks , last day    latanoprost (XALATAN) 0 005 % ophthalmic solution 12/3/2019 at Unknown time  Yes Yes   Sig: Administer 1 drop to both eyes daily at bedtime   mirtazapine (REMERON) 15 mg tablet 12/3/2019 at Unknown time Outside Facility ([address] [place] [address]) Yes Yes   Sig: Take 15 mg by mouth daily at bedtime Monday, Wednesday, Friday, Saturday,     mirtazapine (REMERON) 7 5 MG tablet Past Week at Unknown time  No Yes   Sig: Take 1 tablet (7 5 mg total) by mouth daily at bedtime   Patient taking differently: Take 7 5 mg by mouth daily at bedtime Tuesday and Thursday      Facility-Administered Medications: None       Past Medical History:   Diagnosis Date    Abnormal blood chemistry 09/21/2009    Arthritis 05/09/2011    localized primary osteoarthritis of lower leg    Arthritis     Backache     Benign essential HTN     last assessed 12/28/17    Cataract     Depression with anxiety     Diarrhea     DVT [facility] thrombosis) ([facility]) 09/09/2011    Right Leg    Generalized anxiety disorder 08/10/2009    Glaucoma     last assesssed 2/11/13    Hearing problem     History of herniated intervertebral disc 04/28/2004    Memory loss     Prostate cancer ([facility] [address]     PVD (peripheral vascular disease) ([facility] [address] ) 04/28/2004    Skin cancer of nose     Skin cancer of nose        Past Surgical History:   Procedure Laterality Date    BUNIONECTOMY      simple bunion exostectomy (silver procedure)    CATARACT EXTRACTION      resolved 2012    CYSTOSCOPY W/ URETERAL STENT PLACEMENT      EYE SURGERY Bilateral     laser, left-2012 , right-2015    FRACTURE SURGERY      spinal, resolved 8/15/10    HERNIA REPAIR      inguinal sliding    OTHER SURGICAL HISTORY      needle cath placement for brachyther applic into genitalia    PROSTATE BIOPSY      needle bx    RENAL ARTERY STENT      REPLACEMENT TOTAL KNEE BILATERAL      right-2012 , left-2016       Family History   Problem Relation Age of Onset    Alzheimer's disease Mother     Stroke Father         CVA    Diabetes Sister      I have reviewed and agree with the history as documented      Social History     Tobacco Use    Smoking status: Never Smoker    Smokeless tobacco: Never Used   Substance Use Topics    Alcohol use: Never     Frequency: Never    Drug use: Never        Review of Systems   Unable to perform ROS: Dementia       Physical Exam  Physical Exam   Constitutional: He appears well-developed and well-nourished  HENT:   Head: Normocephalic and atraumatic  Neck: Normal range of motion  Cardiovascular: Normal rate and regular rhythm  Pulmonary/Chest: Effort normal and breath sounds normal    Abdominal: Soft  Bowel sounds are normal  He exhibits no distension  There is no tenderness  Musculoskeletal: Normal range of motion  He exhibits no edema or deformity  Neurological: He is alert  He has normal strength  He is disoriented  No cranial nerve deficit or sensory deficit  GCS eye subscore is 4  GCS verbal subscore is 5  GCS motor subscore is 6  Skin: Skin is warm and dry  Nursing note and vitals reviewed  Vital Signs  ED Triage Vitals   Temperature Pulse Respirations Blood Pressure SpO2   12/04/19 1407 12/04/19 1407 12/04/19 1407 12/04/19 1407 12/04/19 1407   98 3 °F (36 8 °C) 82 18 162/79 98 %      Temp Source Heart Rate Source Patient Position - Orthostatic VS BP Location FiO2 (%)   12/04/19 1407 12/04/19 1407 12/04/19 1407 12/04/19 1407 --   Temporal Monitor Lying Right arm       Pain Score       12/04/19 1245       No Pain           Vitals:    12/04/19 1407   BP: 162/79   Pulse: 82   Patient Position - Orthostatic VS: Lying         Visual Acuity      ED Medications  Medications - No data to display    Diagnostic Studies  Results Reviewed     None                 No orders to display              Procedures  Procedures         ED Course                               MDM  Number of Diagnoses or Management Options  Diagnosis management comments: I reviewed the patient's old labs and testing    The patient is medically cleared to be psychiatrically screened       Amount and/or Complexity of Data Reviewed  Decide to obtain previous medical records or to obtain history from someone other than the patient: yes  Review and summarize past medical records: yes          Disposition  Final diagnoses:   None     ED Disposition     None Follow-up Information    None         Patient's Medications   Discharge Prescriptions    No medications on file     No discharge procedures on file      ED Provider  Electronically Signed by           Elena Hampton MD  12/04/19 1491

## 2019-12-04 NOTE — TELEPHONE ENCOUNTER
Dr Kobi Sheikh:    Patient called stating that patient is in [facility]  again for same reason as last week  Please call back to discuss further

## 2019-12-04 NOTE — ED NOTES
12/4/19 @ 1315:  PES met with patient and reviewed notes from last visit (11/27/19)  Patient has been repeatedly making suicidal statements at nursing home, and continued upon his arrival   Patient says, "I want to kill myself; I don't want to live; I have nothing to live for; I want to go home to be with my wife; I don't like being told what to do; I want to do whatever I want!"  Patient was tearful as he made these statements  Although patient doesn't seem physically capable of committing suicide, as long as he continues making these threats, he will be continuously returned to the ED  PES reluctantly contacted family guidance to screen for commitment  Patient was seen and screened on 11/27/19, and discharged back to nursing home, but patient is very forceful and adamant about suicide  ED MD states that patient is "medically cleared," as he had "full workup several days ago "       PES contacted Mariam Bolanos at [facility] and requested screening  Major neurocognitive disorder, with behavioral disturbance:       Mabel, MS    1400: Du from family guidance arrived to screen patient for commitment    Jason Melissa, MS

## 2019-12-04 NOTE — ED NOTES
TC to [facility]  Requested new copy of PHIL Castro, DOROTA  12/04/19 0865

## 2019-12-04 NOTE — TELEPHONE ENCOUNTER
Called patients wife back    Emelina Joyner was admitted to [facility] for psychiatric evaluation again because of suicidal tendencies tc/cma

## 2019-12-04 NOTE — ED NOTES
Geovanna  will pick pt up at [address] [facility], [address] [facility]  12/04/19 [address] [place] [address], [address] [facility]  12/04/19 6967

## 2019-12-04 NOTE — ED NOTES
12/4/19 @ 1515:  PES met with Camacho Oliver from [facility], who completed screening and collateral contacts, and patient is being discharged back to Nursing home  Camacho Oliver stated that patient has been started on Namenda today, and this may prevent some of these behaviors  Patient to be discharged back to Nursing Home    Rakel Meier, MS

## 2019-12-04 NOTE — ED NOTES
[address] called and left voice mail for PES ([phone], ext [phone]) - call was returned about 16:15 - Nursing home is upset that patient is being d/c'd and is suicidal - explained process for involuntary admissions to them and provided the phone number for [facility] / Renu Hart to discuss further

## 2019-12-04 NOTE — ED NOTES
Pt anxious and calling out in bed  Reassurance and reorientation provided        Cheryl Fontenot RN  12/04/19 3113

## 2019-12-04 NOTE — ED NOTES
Pt screaming Help Me! Refused dinner ordered  C/O headache top of head but sts tylenol won't work       Wei Abdul RN  12/04/19 2044

## 2019-12-04 NOTE — ED NOTES
Spoke with Thanh Capellan at [facility]  Pt will be returning back to NH  [facility] called       George Rouse, DOROTA  12/04/19 6704

## 2019-12-04 NOTE — ED NOTES
Patient calm and cooperative for vitals, dinner tray ordered  Patient informed he will be picked up at 2000   Will continue to monitor      Linnie Halsted  12/04/19 9125

## 2019-12-05 NOTE — ED NOTES
Geovanna called in [facility] and will be here after drop off there     Astrid Perea RN  12/04/19 2043

## 2019-12-05 NOTE — ED NOTES
Called [facility] to give nurse to nurse report       Wynetta Goldberg, RN  12/04/19 7608

## 2019-12-10 ENCOUNTER — HOSPITAL ENCOUNTER (EMERGENCY)
Facility: HOSPITAL | Age: 74
End: 2019-12-12
Attending: EMERGENCY MEDICINE | Admitting: EMERGENCY MEDICINE
Payer: COMMERCIAL

## 2019-12-10 DIAGNOSIS — F03.90 DEMENTIA (HCC): Primary | ICD-10-CM

## 2019-12-10 DIAGNOSIS — R45.850 HOMICIDAL IDEATION: ICD-10-CM

## 2019-12-10 LAB
ALBUMIN SERPL BCP-MCNC: 2.9 G/DL (ref 3.5–5)
ALP SERPL-CCNC: 114 U/L (ref 46–116)
ALT SERPL W P-5'-P-CCNC: 11 U/L (ref 12–78)
AMPHETAMINES SERPL QL SCN: NEGATIVE
ANION GAP SERPL CALCULATED.3IONS-SCNC: 8 MMOL/L (ref 4–13)
AST SERPL W P-5'-P-CCNC: 18 U/L (ref 5–45)
BACTERIA UR QL AUTO: ABNORMAL /HPF
BARBITURATES UR QL: NEGATIVE
BASOPHILS # BLD AUTO: 0.05 THOUSANDS/ΜL (ref 0–0.1)
BASOPHILS NFR BLD AUTO: 1 % (ref 0–1)
BENZODIAZ UR QL: NEGATIVE
BILIRUB SERPL-MCNC: 0.3 MG/DL (ref 0.2–1)
BILIRUB UR QL STRIP: NEGATIVE
BUN SERPL-MCNC: 17 MG/DL (ref 5–25)
CALCIUM SERPL-MCNC: 8.9 MG/DL (ref 8.3–10.1)
CHLORIDE SERPL-SCNC: 100 MMOL/L (ref 100–108)
CLARITY UR: ABNORMAL
CO2 SERPL-SCNC: 27 MMOL/L (ref 21–32)
COCAINE UR QL: NEGATIVE
COLOR UR: ABNORMAL
CREAT SERPL-MCNC: 0.75 MG/DL (ref 0.6–1.3)
EOSINOPHIL # BLD AUTO: 0.24 THOUSAND/ΜL (ref 0–0.61)
EOSINOPHIL NFR BLD AUTO: 4 % (ref 0–6)
ERYTHROCYTE [DISTWIDTH] IN BLOOD BY AUTOMATED COUNT: 15.8 % (ref 11.6–15.1)
ETHANOL SERPL-MCNC: <3 MG/DL (ref 0–3)
GFR SERPL CREATININE-BSD FRML MDRD: 89 ML/MIN/1.73SQ M
GLUCOSE SERPL-MCNC: 100 MG/DL (ref 65–140)
GLUCOSE UR STRIP-MCNC: NEGATIVE MG/DL
HCT VFR BLD AUTO: 36.2 % (ref 36.5–49.3)
HGB BLD-MCNC: 11.4 G/DL (ref 12–17)
HGB UR QL STRIP.AUTO: ABNORMAL
IMM GRANULOCYTES # BLD AUTO: 0.02 THOUSAND/UL (ref 0–0.2)
IMM GRANULOCYTES NFR BLD AUTO: 0 % (ref 0–2)
KETONES UR STRIP-MCNC: NEGATIVE MG/DL
LEUKOCYTE ESTERASE UR QL STRIP: ABNORMAL
LYMPHOCYTES # BLD AUTO: 1.06 THOUSANDS/ΜL (ref 0.6–4.47)
LYMPHOCYTES NFR BLD AUTO: 17 % (ref 14–44)
MCH RBC QN AUTO: 25.4 PG (ref 26.8–34.3)
MCHC RBC AUTO-ENTMCNC: 31.5 G/DL (ref 31.4–37.4)
MCV RBC AUTO: 81 FL (ref 82–98)
METHADONE UR QL: NEGATIVE
MONOCYTES # BLD AUTO: 0.66 THOUSAND/ΜL (ref 0.17–1.22)
MONOCYTES NFR BLD AUTO: 10 % (ref 4–12)
NEUTROPHILS # BLD AUTO: 4.32 THOUSANDS/ΜL (ref 1.85–7.62)
NEUTS SEG NFR BLD AUTO: 68 % (ref 43–75)
NITRITE UR QL STRIP: POSITIVE
NON-SQ EPI CELLS URNS QL MICRO: ABNORMAL /HPF
NRBC BLD AUTO-RTO: 0 /100 WBCS
OPIATES UR QL SCN: NEGATIVE
PCP UR QL: NEGATIVE
PH UR STRIP.AUTO: 8.5 [PH]
PLATELET # BLD AUTO: 278 THOUSANDS/UL (ref 149–390)
PMV BLD AUTO: 9 FL (ref 8.9–12.7)
POTASSIUM SERPL-SCNC: 4.1 MMOL/L (ref 3.5–5.3)
PROT SERPL-MCNC: 7.1 G/DL (ref 6.4–8.2)
PROT UR STRIP-MCNC: ABNORMAL MG/DL
RBC # BLD AUTO: 4.49 MILLION/UL (ref 3.88–5.62)
RBC #/AREA URNS AUTO: ABNORMAL /HPF
SODIUM SERPL-SCNC: 135 MMOL/L (ref 136–145)
SP GR UR STRIP.AUTO: 1.01 (ref 1–1.03)
THC UR QL: NEGATIVE
TRI-PHOS CRY URNS QL MICRO: ABNORMAL /HPF
UROBILINOGEN UR QL STRIP.AUTO: 0.2 E.U./DL
WBC # BLD AUTO: 6.35 THOUSAND/UL (ref 4.31–10.16)
WBC #/AREA URNS AUTO: ABNORMAL /HPF

## 2019-12-10 PROCEDURE — 85025 COMPLETE CBC W/AUTO DIFF WBC: CPT | Performed by: EMERGENCY MEDICINE

## 2019-12-10 PROCEDURE — 80320 DRUG SCREEN QUANTALCOHOLS: CPT | Performed by: EMERGENCY MEDICINE

## 2019-12-10 PROCEDURE — 80053 COMPREHEN METABOLIC PANEL: CPT | Performed by: EMERGENCY MEDICINE

## 2019-12-10 PROCEDURE — 80307 DRUG TEST PRSMV CHEM ANLYZR: CPT | Performed by: EMERGENCY MEDICINE

## 2019-12-10 PROCEDURE — 99285 EMERGENCY DEPT VISIT HI MDM: CPT

## 2019-12-10 PROCEDURE — 36415 COLL VENOUS BLD VENIPUNCTURE: CPT | Performed by: EMERGENCY MEDICINE

## 2019-12-10 PROCEDURE — 81001 URINALYSIS AUTO W/SCOPE: CPT | Performed by: EMERGENCY MEDICINE

## 2019-12-10 RX ORDER — MEMANTINE HYDROCHLORIDE 10 MG/1
10 TABLET ORAL 2 TIMES DAILY
COMMUNITY

## 2019-12-10 RX ORDER — CEPHALEXIN 500 MG/1
500 CAPSULE ORAL 2 TIMES DAILY
Status: DISCONTINUED | OUTPATIENT
Start: 2019-12-10 | End: 2019-12-12 | Stop reason: HOSPADM

## 2019-12-10 RX ADMIN — CEPHALEXIN 500 MG: 500 CAPSULE ORAL at 22:36

## 2019-12-10 NOTE — ED NOTES
PES spoke to patient's Attending upon arrival in the ER - patient has been seen and screened twice in the past 2 weeks for making suicidal statements at [facility]  Patient has dementia with a very impaired short-term memory; therefore patient lacks th capability for "informed consent" and will need to be screened again  JEANNIE/Tim informed patient was in ER again @ 15:40 - PES will fax over H&P with medical clearance when available  Zoraida Anderson / Carlos Waters called about 17:30 - incomplete chart was faxed to them @ that time  Zoraida Anderson / Ronda Baca came to ER about 18:00 to screen the patient  Patient is awaiting tele-psych for commitment  Zoraida Anderson faxed their assessment to ER - copy made for envelope and copy for the chart stickered and placed on the chart  [facility] @ 15:45 - [phone] - Rn supervisor is not available now and will return call  Spoke with Duncan Unger - Medardo supervisor @ 16:35: "Patient was eating lunch in his room @ 13:00 with his wife visiting - patient had an outburst - threw his food tray @ the wall and said - I will burn this place down and kill myself - staff attempted to redirect patient - they decided that patient would need to come to ER - but the patient did calm down until the ambulance crew showed up and patient got enraged again and threatened to kill the ambulance crew and himself; staff had taken everything out of the room as a precaution"

## 2019-12-10 NOTE — ED NOTES
Received patient from nursing home facility  PAtient is alert,but confused to time and place  Patient is cooperative to care and no noted behaviors  Patient has been dozing off sleeping since his arrival to the ER  Side rails are up  IV HL #22g placed to left lower arm       Sheri Ortega RN  12/10/19 9404

## 2019-12-10 NOTE — ED NOTES
[facility] in to speak with patient  Patient not making any sense at times,confused as to time and place       Halina Mcnair RN  12/10/19 6917

## 2019-12-10 NOTE — ED NOTES
Patient's pants and attends removed,cleaned and dried post incontinence of urine in attends  Urinal placed to catch urine specimen  Patient watching TV  No statements of SI or HI noted since patient arrived here in the ER  Patient's wife called and spoke with this nurse and updated on his care  Side rails are up       Krissy Webb RN  12/10/19 5031

## 2019-12-10 NOTE — ED NOTES
Patient resting in room,no behaviors noted  Side rails up  Continues on observation with nurse,for SI/HI,none noted       Kassidy Hansen RN  12/10/19 5295

## 2019-12-11 RX ORDER — MIRTAZAPINE 15 MG/1
15 TABLET, FILM COATED ORAL
Status: DISCONTINUED | OUTPATIENT
Start: 2019-12-11 | End: 2019-12-12 | Stop reason: HOSPADM

## 2019-12-11 RX ORDER — LATANOPROST 50 UG/ML
1 SOLUTION/ DROPS OPHTHALMIC
Status: DISCONTINUED | OUTPATIENT
Start: 2019-12-11 | End: 2019-12-12 | Stop reason: HOSPADM

## 2019-12-11 RX ORDER — MEMANTINE HYDROCHLORIDE 10 MG/1
10 TABLET ORAL DAILY
Status: DISCONTINUED | OUTPATIENT
Start: 2019-12-11 | End: 2019-12-12 | Stop reason: HOSPADM

## 2019-12-11 RX ADMIN — MEMANTINE 10 MG: 10 TABLET ORAL at 09:34

## 2019-12-11 RX ADMIN — CEPHALEXIN 500 MG: 500 CAPSULE ORAL at 18:26

## 2019-12-11 RX ADMIN — LATANOPROST 1 DROP: 50 SOLUTION OPHTHALMIC at 21:20

## 2019-12-11 RX ADMIN — MIRTAZAPINE 15 MG: 15 TABLET, FILM COATED ORAL at 21:19

## 2019-12-11 RX ADMIN — CEPHALEXIN 500 MG: 500 CAPSULE ORAL at 09:34

## 2019-12-11 NOTE — ED NOTES
Pt has clergy at bedside  Pt offers no complaints at this time  1:1 at bedside        [address], RN  12/11/19 3395

## 2019-12-11 NOTE — ED NOTES
Pt sitting up in bed no distress noted, offers no complaints  "im fine" warm blanket/beverage offered pt declined     Jordan Judge RN  12/11/19 7206

## 2019-12-11 NOTE — ED NOTES
Pt noted to sleep, lying on his back  Resp even and non labored  1:1 continues         Gabriel Bray RN  12/11/19 2068

## 2019-12-11 NOTE — ED NOTES
Pt laying in hospital bed with friend at bedside as well as 1:1   Pt offers no complaints at this time         Cintia Thompson, RN  12/11/19 9663

## 2019-12-11 NOTE — ED NOTES
Bp rechecked both manually and automatically (with different more appropriate sized cuff) BP reading now WNL  Pt easily awakens to verbal stimuli, oriented to person thought he was "in [facility]" pt reoriented to present surroundings and situation  Pt informed he was here for suicidal and homicidal ideations and threats, pt appeared surprised "I am I said that I don't want to hurt anyone" pt denies being suicidal or homicidal at this time  Is calm and cooperative  Denies any complaints at this time, reports he is comfortable when asked   Skin warm pink and dry resp easy and unlabored      Caitlin Swan RN  12/11/19 0113

## 2019-12-11 NOTE — ED NOTES
12/11/19 @ 0815:  PES placed commitment papers in white envelope and placed copy on chart with stickers  [facility] to commence with bed search  [address], [address]: Received update from Jesusita Rose at [facility] center; patient currently referred to [facility], but patient's wife requested that referral be made to [facility], which will be done  Mabel MS  1200: Patient's wife visited and provided POA  PES made copy and placed on chart with stickers    PES provided update on [facility] referral   [address], MS

## 2019-12-11 NOTE — ED NOTES
Report received pt presently lying in bed eyes closed 1:1 staff remains present at bedside for reports of suicidal/homicidal ideation   No distress observed, awaiting eval by family guidance     Isatu Dempsey RN  12/11/19 5955

## 2019-12-11 NOTE — ED NOTES
Patient awoke to take medication  Offers no complaints at this time  Pt given boxed lunch  1:1 remains for pt safety       Lilla Gosselin, RN  12/10/19 3005

## 2019-12-11 NOTE — ED NOTES
Pt swallowed medication with water without problem  Sitting up for his meal at this time        Betina Cotton RN  12/11/19 2550

## 2019-12-11 NOTE — ED NOTES
Patient remains resting on stretcher, calm and cooperative  Patient not oriented to place or time  Pt fed breakfast by this RN  Consumed approx 25% of meal  Pt repeats "I'm tired  I need a nap"  Pt repositioned for comfort  One to one remains at bedside       Jeremie Medeiros RN  12/11/19 3818

## 2019-12-11 NOTE — ED NOTES
Pt calm and cooperative, laying in bed  1:1 remains at bedside        [address], RN  12/11/19 2702

## 2019-12-11 NOTE — ED NOTES
Patient is resting comfortably  Respirations easy and unlabored  1:1 remains for pt safety       Jaswinder Romano RN  12/10/19 5972

## 2019-12-11 NOTE — ED NOTES
Patient was cleaned  A small amount of urine and soft stool was present  Patient resting on his right side        Karina Lawson  12/11/19 4506

## 2019-12-11 NOTE — ED NOTES
Pt observed asleep in bed eyes closed symmetrical rise and fall of chest noted   No apparent distress     Nakul Joseph RN  12/11/19 3925

## 2019-12-11 NOTE — ED NOTES
Received pt from [facility]  Pt is resting in bed, watching TV  Denies any pain and discomfort  Pt is alert to self, month and able to answers the [address] president  Pt asked what brought him to hospital, he stated that because he falls down a lot  Pt reported that he is not walking and use wheelchair  Pt denies any SI/HI  He states "I wanna keep on living and I love all people around me, I don't want to hurt anyone"  Pt denies any needs at this time and 1:1 at bedside         Jennie Negron RN  12/11/19 7032

## 2019-12-11 NOTE — ED PROVIDER NOTES
History  Chief Complaint   Patient presents with    Suicidal     Pt brought in by squad and  from [address], reported that he had an outburst in facility and stated that want to hurt him self  Pt admittted being suicidal  Pt has dementia   Homicidal     Patient presents from NH for evaluation of suicidal statements made during an outburst  Patient has history of dementia and does not remember what he said but admits to being depressed  History provided by:  Patient   used: No    Suicidal       Prior to Admission Medications   Prescriptions Last Dose Informant Patient Reported? Taking?    Mirabegron ER (MYRBETRIQ) 50 MG TB24   Yes Yes   Sig: Take by mouth daily   brimonidine-timolol (COMBIGAN) 0 2-0 5 %  Self Yes Yes   Sig: Administer 1 drop to both eyes 2 (two) times a day    cyanocobalamin (VITAMIN B-12) 1000 MCG tablet  Outside Facility (Specify) Yes Yes   Sig: Take 2,000 mcg by mouth daily    docusate sodium (COLACE) 100 mg capsule  Outside Facility (Specify) Yes Yes   Sig: Take 100 mg by mouth 2 (two) times a day   fluticasone (FLONASE) 50 mcg/act nasal spray  Outside Facility (Specify) Yes No   Si spray into each nostril daily For 2 weeks , last day    latanoprost (XALATAN) 0 005 % ophthalmic solution   Yes Yes   Sig: Administer 1 drop to both eyes daily at bedtime   memantine (NAMENDA) 10 mg tablet 12/10/2019 at Unknown time Outside Facility ([facility]) Yes Yes   Sig: Take 10 mg by mouth 2 (two) times a day   mirtazapine (REMERON) 15 mg tablet  Outside Facility (Specify) Yes Yes   Sig: Take 15 mg by mouth daily at bedtime Monday, Wednesday, Friday, Saturday,     mirtazapine (REMERON) 7 5 MG tablet   No Yes   Sig: Take 1 tablet (7 5 mg total) by mouth daily at bedtime   Patient taking differently: Take 7 5 mg by mouth daily at bedtime Tuesday and Thursday      Facility-Administered Medications: None       Past Medical History:   Diagnosis Date    Abnormal blood chemistry 09/21/2009    Arthritis 05/09/2011    localized primary osteoarthritis of lower leg    Arthritis     Backache     Benign essential HTN     last assessed 12/28/17    Cataract     Depression with anxiety     Diarrhea     DVT (deep venous thrombosis) ([facility]) 09/09/2011    Right Leg    Generalized anxiety disorder 08/10/2009    Glaucoma     last assesssed 2/11/13    Hearing problem     History of herniated intervertebral disc 04/28/2004    Memory loss     Prostate cancer ([facility] )     PVD (peripheral vascular disease) ([facility] ) 04/28/2004    Skin cancer of nose     Skin cancer of nose        Past Surgical History:   Procedure Laterality Date    BUNIONECTOMY      simple bunion exostectomy (silver procedure)    CATARACT EXTRACTION      resolved 2012    CYSTOSCOPY W/ URETERAL STENT PLACEMENT      EYE SURGERY Bilateral     laser, left-2012 , right-2015    FRACTURE SURGERY      spinal, resolved 8/15/10    HERNIA REPAIR      inguinal sliding    OTHER SURGICAL HISTORY      needle cath placement for brachyther applic into genitalia    PROSTATE BIOPSY      needle bx    RENAL ARTERY STENT      REPLACEMENT TOTAL KNEE BILATERAL      right-2012 , left-2016       Family History   Problem Relation Age of Onset    Alzheimer's disease Mother     Stroke Father         CVA    Diabetes Sister      I have reviewed and agree with the history as documented  Social History     Tobacco Use    Smoking status: Never Smoker    Smokeless tobacco: Never Used   Substance Use Topics    Alcohol use: Never     Frequency: Never    Drug use: Never        Review of Systems   All other systems reviewed and are negative  Physical Exam  Physical Exam   Constitutional: No distress  HENT:   Head: Atraumatic  Mouth/Throat: Oropharynx is clear and moist    Eyes: Pupils are equal, round, and reactive to light  Neck: Normal range of motion     Cardiovascular: Normal rate, regular rhythm and intact distal pulses  Pulmonary/Chest: Effort normal and breath sounds normal    Abdominal: Soft  There is no tenderness  Musculoskeletal: Normal range of motion  Neurological: He is alert  AAOx2 person and place, knows the year but not month or day  Skin: Capillary refill takes less than 2 seconds  He is not diaphoretic  Nursing note and vitals reviewed        Vital Signs  ED Triage Vitals [12/10/19 1405]   Temperature Pulse Respirations Blood Pressure SpO2   97 7 °F (36 5 °C) 68 18 150/71 99 %      Temp Source Heart Rate Source Patient Position - Orthostatic VS BP Location FiO2 (%)   Temporal Monitor Lying Left arm --      Pain Score       5           Vitals:    12/10/19 1405 12/10/19 1736   BP: 150/71 150/68   Pulse: 68 66   Patient Position - Orthostatic VS: Lying Lying         Visual Acuity      ED Medications  Medications - No data to display    Diagnostic Studies  Results Reviewed     Procedure Component Value Units Date/Time    Comprehensive metabolic panel [[id number]]  (Abnormal) Collected:  12/10/19 1449    Lab Status:  Final result Specimen:  Blood from Arm, Left Updated:  12/10/19 1510     Sodium 135 mmol/L      Potassium 4 1 mmol/L      Chloride 100 mmol/L      CO2 27 mmol/L      ANION GAP 8 mmol/L      BUN 17 mg/dL      Creatinine 0 75 mg/dL      Glucose 100 mg/dL      Calcium 8 9 mg/dL      AST 18 U/L      ALT 11 U/L      Alkaline Phosphatase 114 U/L      Total Protein 7 1 g/dL      Albumin 2 9 g/dL      Total Bilirubin 0 30 mg/dL      eGFR 89 ml/min/1 73sq m     Narrative:       Jarvis guidelines for Chronic Kidney Disease (CKD):     Stage 1 with normal or high GFR (GFR > 90 mL/min/1 73 square meters)    Stage 2 Mild CKD (GFR = 60-89 mL/min/1 73 square meters)    Stage 3A Moderate CKD (GFR = 45-59 mL/min/1 73 square meters)    Stage 3B Moderate CKD (GFR = 30-44 mL/min/1 73 square meters)    Stage 4 Severe CKD (GFR = 15-29 mL/min/1 73 square meters)    Stage 5 End Stage CKD (GFR <15 mL/min/1 73 square meters)  Note: GFR calculation is accurate only with a steady state creatinine    Ethanol [[id number]]  (Normal) Collected:  12/10/19 1449    Lab Status:  Final result Specimen:  Blood from Arm, Left Updated:  12/10/19 1507     Ethanol Lvl <3 mg/dL     CBC and differential [[id number]]  (Abnormal) Collected:  12/10/19 1449    Lab Status:  Final result Specimen:  Blood from Arm, Left Updated:  12/10/19 1454     WBC 6 35 Thousand/uL      RBC 4 49 Million/uL      Hemoglobin 11 4 g/dL      Hematocrit 36 2 %      MCV 81 fL      MCH 25 4 pg      MCHC 31 5 g/dL      RDW 15 8 %      MPV 9 0 fL      Platelets 420 Thousands/uL      nRBC 0 /100 WBCs      Neutrophils Relative 68 %      Immat GRANS % 0 %      Lymphocytes Relative 17 %      Monocytes Relative 10 %      Eosinophils Relative 4 %      Basophils Relative 1 %      Neutrophils Absolute 4 32 Thousands/µL      Immature Grans Absolute 0 02 Thousand/uL      Lymphocytes Absolute 1 06 Thousands/µL      Monocytes Absolute 0 66 Thousand/µL      Eosinophils Absolute 0 24 Thousand/µL      Basophils Absolute 0 05 Thousands/µL     UA (URINE) with reflex to Scope [[id number]]     Lab Status:  No result Specimen:  Urine     Rapid drug screen, urine [[id number]]     Lab Status:  No result Specimen:  Urine                  No orders to display              Procedures  Procedures         ED Course                               MDM  Number of Diagnoses or Management Options  Diagnosis management comments: Pulse ox 98% on RA indicating adequate oxygenation    Patient medically cleared for mental health evaluation and inpatient admission as needed  PAtient signed out to next provider Dr Sp Lentz pending family guidance evaluation          Amount and/or Complexity of Data Reviewed  Clinical lab tests: ordered and reviewed  Decide to obtain previous medical records or to obtain history from someone other than the patient: yes  Review and summarize past medical records: yes  Discuss the patient with other providers: yes    Patient Progress  Patient progress: stable        Disposition  Final diagnoses:   None     ED Disposition     None      Follow-up Information    None         Patient's Medications   Discharge Prescriptions    No medications on file     No discharge procedures on file      ED Provider  Electronically Signed by           Shoaib Koenig DO  12/10/19 0202

## 2019-12-11 NOTE — ED NOTES
Patient offered boxed lunch  Pt declined at this time  Patient calm and cooperative  Continual observation maintained for pt safety       Nelda Johnston RN  12/10/19 2050

## 2019-12-11 NOTE — ED NOTES
Called Bridgette Staley / Michael Walker for at update @ 17:30 - she will call back  Bridgette Staley / Ekta Garcia called back @ 17:50 - [facility] has the referral - Bridgette Staley is waiting for a letter from [facility] stating they will take him back at time of d/c  Bridgette Staley / Ekta Garcia called about 18:40 - needed insurance phone number for pre-cert which PES was able to provide; accepted @ Julien Cain by Dr Marycruz Larson; Rn report can be called to [phone]  [facility] / Fercho Maciel called for approximate ambulance time which will be on 12/12/19 - Hannah Villeda O notified  Bridgette Staley / Ekta Garcia called about 19:45 to report the precert was completed; [facility] / Azeem Sigala called for transport - they will call back with a time - 07:00 - 08:00; JEANNIE/Fermin O notified about 21:45; ER staff updated

## 2019-12-11 NOTE — ED NOTES
Patient received resting on stretcher, calm and cooperative  Patient had BM, personal care provided by nursing aides        Stefani Garrison RN  12/11/19 7222

## 2019-12-12 ENCOUNTER — TELEPHONE (OUTPATIENT)
Dept: FAMILY MEDICINE CLINIC | Facility: CLINIC | Age: 74
End: 2019-12-12

## 2019-12-12 VITALS
HEART RATE: 72 BPM | BODY MASS INDEX: 23.77 KG/M2 | OXYGEN SATURATION: 95 % | TEMPERATURE: 99.1 F | SYSTOLIC BLOOD PRESSURE: 139 MMHG | RESPIRATION RATE: 20 BRPM | DIASTOLIC BLOOD PRESSURE: 67 MMHG | WEIGHT: 147.27 LBS

## 2019-12-12 NOTE — ED NOTES
Pt sleeping,regular,unlabored respirations noted,awakes with verbal stimulation  1:1 continues       Alonso Herring RN  12/12/19 0026

## 2019-12-12 NOTE — ED NOTES
Pt resting comfortably,1:1 continues  Pt calm,cooperative at this time,I told pt we would keep him safe       Jimenez Solomon RN  12/11/19 1395

## 2019-12-12 NOTE — ED NOTES
Pt noted to be upset, he is teary and states he misses his wife  Pt is not aware where he is and doesn't remember what happen yesterday  He didn't remember that he saw his wife yesterday  Pt re-oriented that he is in [facility]  Pt is pleasant and follows commands        Jl Winkler RN  12/11/19 1930

## 2019-12-12 NOTE — ED NOTES
12/12/19 @ [phone]:  [facility] updated by Malena Fermin [facility]; patient scheduled for transport to [place] @ 0800  MS Mabel  0800: [facility] arrived to transport patient to [place]; [address] updated patient's wife    Alicia Shepherd, MS

## 2019-12-12 NOTE — ED NOTES
Pt sleeping regular,unlabored respirations noted  1:1 continues       Augustina Mark RN  12/12/19 9860

## 2019-12-12 NOTE — ED NOTES
Pt awakes easily with verbal stimulation,denies suicidal thoughts at this time,denies pain       Francine Holley RN  12/12/19 3032

## 2019-12-12 NOTE — ED NOTES
Patient sleeping, resp easy, remains on 1:1 observation  Awaiting transportation to [employer] [facility] RN  12/12/19 7225

## 2019-12-12 NOTE — EMTALA/ACUTE CARE TRANSFER
[facility] EMERGENCY DEPARTMENT  [place]  [place] [address]  Dept: [phone]      EMTALA TRANSFER CONSENT    NAME Julián Santana                                         1945                              MRN [id number]    I have been informed of my rights regarding examination, treatment, and transfer   by Dr Carmelita Wilson MD    Benefits: Specialized equipment and/or services available at the receiving facility (Include comment)________________________    Risks: Increased discomfort during transfer, Possible worsening of condition or death during transfer, Potential for delay in receiving treatment      Consent for Transfer:  I acknowledge that my medical condition has been evaluated and explained to me by the emergency department physician or other qualified medical person and/or my attending physician, who has recommended that I be transferred to the service of  Accepting Physician: Dr Nick Ribeiro at [address] Name, [address] : Jenny Sanders  The above potential benefits of such transfer, the potential risks associated with such transfer, and the probable risks of not being transferred have been explained to me, and I fully understand them  The doctor has explained that, in my case, the benefits of transfer outweigh the risks  I agree to be transferred  I authorize the performance of emergency medical procedures and treatments upon me in both transit and upon arrival at the receiving facility  Additionally, I authorize the release of any and all medical records to the receiving facility and request they be transported with me, if possible  I understand that the safest mode of transportation during a medical emergency is an ambulance and that the Hospital advocates the use of this mode of transport   Risks of traveling to the receiving facility by car, including absence of medical control, life sustaining equipment, such as oxygen, and medical personnel has been explained to me and I fully understand them  (BRENNAN CORRECT BOX BELOW)  [  ]  I consent to the stated transfer and to be transported by ambulance/helicopter  [  ]  I consent to the stated transfer, but refuse transportation by ambulance and accept full responsibility for my transportation by car  I understand the risks of non-ambulance transfers and I exonerate the Hospital and its staff from any deterioration in my condition that results from this refusal     X___________________________________________    DATE  19  TIME________  Signature of patient or legally responsible individual signing on patient behalf           RELATIONSHIP TO PATIENT_________________________          Provider Certification    NAME Lori Urbina                                         1945                              MRN [id number]    A medical screening exam was performed on the above named patient  Based on the examination:    Condition Necessitating Transfer The primary encounter diagnosis was Dementia ([address] )  A diagnosis of Homicidal ideation was also pertinent to this visit      Patient Condition: The patient has been stabilized such that within reasonable medical probability, no material deterioration of the patient condition or the condition of the unborn child(cecy) is likely to result from the transfer    Reason for Transfer: Level of Care needed not available at this facility    Transfer Requirements: [facility]   · Space available and qualified personnel available for treatment as acknowledged by    · Agreed to accept transfer and to provide appropriate medical treatment as acknowledged by       Dr Jameson Slaughter  · Appropriate medical records of the examination and treatment of the patient are provided at the time of transfer   [address] _______  · Transfer will be performed by qualified personnel from    and appropriate transfer equipment as required, including the use of necessary and appropriate life support measures  Provider Certification: I have examined the patient and explained the following risks and benefits of being transferred/refusing transfer to the patient/family:  The patient is stable for psychiatric transfer because they are medically stable, and is protected from harming him/herself or others during transport      Based on these reasonable risks and benefits to the patient and/or the unborn child(cecy), and based upon the information available at the time of the patients examination, I certify that the medical benefits reasonably to be expected from the provision of appropriate medical treatments at another medical facility outweigh the increasing risks, if any, to the individuals medical condition, and in the case of labor to the unborn child, from effecting the transfer      X____________________________________________ DATE 12/11/19        TIME_______      ORIGINAL - SEND TO MEDICAL RECORDS   COPY - SEND WITH PATIENT DURING TRANSFER

## 2019-12-12 NOTE — ED NOTES
Patient resting in bed  Vitals obtained  1:1 at bedside  Patient offers no complaints at this time  Calm and cooperative at this time        Adonis Holter, RN  12/11/19 3266

## 2019-12-12 NOTE — ED NOTES
Pt is cleaned, large amount of urine and medium brown stool noted  Pt is reposition in bed        Nas Loya RN  12/11/19 1945

## 2019-12-12 NOTE — ED NOTES
Patient smacking self in face  If I hurt myself I will get out of here        Joeyrosita Arroyo  12/11/19 2008

## 2019-12-12 NOTE — ED NOTES
[facility] here for transport to [facility]  All papers and report given to EMS        Graciela Hampton RN  12/12/19 1610

## 2019-12-12 NOTE — TELEPHONE ENCOUNTER
Dr Erica Garcia:    FYI:    Patient's wife called to advise you that the patient is being [facility] for [facility] evaluation

## 2019-12-12 NOTE — ED NOTES
Patient continues to ramble on about how he hates this place, is going to leave and you cant stop me, and you cant tell me what to do  [facility]  I will not stay here        Linda Dietrich  12/11/19 2006

## 2019-12-12 NOTE — ED NOTES
Verbal report given to Medardo Mckeon Setting @ [address]  pending [phone] by LELAND Walsh RN  12/12/19 8492

## 2019-12-13 NOTE — TELEPHONE ENCOUNTER
Cintia Sober is doing ok   She doesn't know how she is going to get to see Sena Nawaf  She said he has had a lot incidents with his mood lately    I told her if she needed anything to call us tc/cma

## 2019-12-23 ENCOUNTER — TELEPHONE (OUTPATIENT)
Dept: FAMILY MEDICINE CLINIC | Facility: CLINIC | Age: 74
End: 2019-12-23

## 2019-12-23 NOTE — TELEPHONE ENCOUNTER
Dr Cori Govea    Patient's wife wants you to know he is being discharged from [place] [address] [facility]  Patient was seeing [facility] [place] at [facility], but wants to know if you can recommend a different psychiatrist  Please advise

## 2019-12-24 NOTE — TELEPHONE ENCOUNTER
Can try Dr Francis Faster or Dr Castillo Mail in [place]--see if we have numbers to give pt-thanks

## 2019-12-26 NOTE — TELEPHONE ENCOUNTER
Left message to call office regarding requested names and phone numbers    Dr Chung Garcialist [phone] or Dr Michael Estevez at [phone]

## 2020-02-13 NOTE — TELEPHONE ENCOUNTER
Call was made to pt (see note under wife-Melissa)--both doing k right now -will call back if any new concerns/questions  normal...

## 2022-08-27 NOTE — ED NOTES
Patient continues on observation for SI/HI,none noted  Patient watching TV in room,side rails are up       Kwaku Monaco RN  12/10/19 9729 Hospitalist

## 2023-11-06 NOTE — TELEPHONE ENCOUNTER
Please ask patient to schedule an ED F/U appt for headache and heat exhaustion - ED Edc letter No  2 mailed to patient with instructions  Subjective     Patient ID: Joao García is a 28 y.o. male.    Chief Complaint: No chief complaint on file.    HPI  Review of Systems       Objective     Physical Exam       Assessment and Plan     1. Encounter for Department of Transportation (DOT) examination for driving license renewal        See scanned documents in .            No follow-ups on file.
